# Patient Record
Sex: FEMALE | Race: WHITE | Employment: OTHER | URBAN - METROPOLITAN AREA
[De-identification: names, ages, dates, MRNs, and addresses within clinical notes are randomized per-mention and may not be internally consistent; named-entity substitution may affect disease eponyms.]

---

## 2021-11-29 RX ORDER — RIBOFLAVIN (VITAMIN B2) 100 MG
100 TABLET ORAL DAILY
COMMUNITY

## 2021-11-29 NOTE — PROGRESS NOTES
DAY OF SURGERY/PROCEDURE  GUIDELINES    As a patient at the [facility], you can expect quality medical and nursing care that is centered on your individual needs. It is our goal to make your surgical experience as comfortable and excellent as possible.  ________________________________________________________________________    The following instructions are general guidelines, if any information on this sheet is different from what your doctor has instructed you to do, please follow your doctor's instructions. · Please arrive on 12/13 1100      · Enter through entrance C. Check in at registration     · Upon arrival you will be taken to the pre-operative area to get ready for surgery, your family will stay in the waiting room and visit with you once you are ready for surgery. Due to special limitations please limit visitation to 1-2 members of your family at a time. When it is time for surgery your family will return to the waiting room. · You will be given a specific time when you will NOT be able to eat, drink, smoke, suck or chew ANYTHING (no water, gum, mints, cigarettes, cigars, pipes, snuff, chewing tobacco, etc.) or your surgery may be canceled.  This will occur during your PAT appointment or by phone 1-2 days before surgery    · Take a shower or bath on the morning of your surgery/procedure (Hibiclens if directed)    · Brush your teeth, but do not swallow any water    · IN CASE OF ILLNESS - If you have a cold or flu symptoms (high fever, runny nose, sore throat, cough, etc.) rash, nausea, vomiting, loose stools, and/or recent contact with someone who has a contagious disease (chick pox, measles, etc.) please call your doctor before coming to the surgery center    · If applicable bring your:  · Inhaler (s)  · Hearing aid(s)  · Eyeglasses and Case (If you wear contacts they have to be removed before surgery, bring case and solution)  · CPAP     · DO NOT take anticoagulants (blood

## 2021-12-07 NOTE — H&P
OB/GYN Pre-Op H&P  [facility]    Patient Name: Kaz Fontaine     Patient : 1950  Room/Bed: [facility] OR [place] [facility]/NONE  Admission Date/Time: 2021 10:59 AM  Primary Care Physician: Nicholas BurrowsSavannah Mcgee  MRN: [id number]    Date: 2021  Time: 11:49 AM    The patient was seen in pre-op holding. She is here for surgical management of vaginal vault prolapse, cystocele, and rectocele. The procedure risks and complications were reviewed. The labs, Consent, and H&P were reviewed and updated. The patient was counseled on the possibility of  the need of a second surgery. The patient voiced understanding and had all of her questions answered. The possibility of incomplete removal of abnormal tissue was discussed. OBSTETRICAL HISTORY:   OB History   No obstetric history on file. PAST MEDICAL HISTORY:   has a past medical history of Arthritis, Femur fracture ([address] PONV (postoperative nausea and vomiting), Postpartum hemorrhage, and Seasonal allergies. PAST SURGICAL HISTORY:   has a past surgical history that includes Femur Surgery (Right, ); Femur Surgery (Right, ); Hysterectomy (); Wrist surgery (Left, ); Nose surgery (); Colonoscopy; and skin biopsy (Left).     ALLERGIES:  Allergies as of 11/10/2021    (Not on File)       MEDICATIONS:  Current Facility-Administered Medications   Medication Dose Route Frequency Provider Last Rate Last Admin    ceFAZolin (ANCEF) 2000 mg in dextrose 5 % 50 mL IVPB  2,000 mg IntraVENous Once Thompson Scarce, DO        lactated ringers infusion   IntraVENous Continuous Santa Patel MD        sodium chloride flush 0.9 % injection 10 mL  10 mL IntraVENous 2 times per day Santa Patel MD        sodium chloride flush 0.9 % injection 10 mL  10 mL IntraVENous PRN Santa Patel MD        0.9 % sodium chloride infusion  25 mL IntraVENous PRN Santa Patel MD        lidocaine PF 1 % injection 1 mL  1 mL IntraDERmal Once PRN Conrado Mauricio MD        scopolamine (TRANSDERM-SCOP) transdermal patch 1 patch  1 patch TransDERmal Once April Ip, MD        phenazopyridine (PYRIDIUM) tablet 200 mg  200 mg Oral Once April Ip, MD        aprepitant (EMEND) capsule 40 mg  40 mg Oral Once Conrado Mauricio MD        famotidine (PEPCID) injection 20 mg  20 mg IntraVENous Once Conrado Mauricio MD        meperidine (DEMEROL) injection 12.5 mg  12.5 mg IntraVENous Q5 Min PRN Conrado Mauricio MD        morphine (PF) injection 2 mg  2 mg IntraVENous Q5 Min PRN Conrado Mauricio MD        HYDROmorphone (DILAUDID) injection 0.5 mg  0.5 mg IntraVENous Q5 Min PRN Conrado Mauricio MD        HYDROmorphone (DILAUDID) injection 0.25 mg  0.25 mg IntraVENous Q5 Min PRN Conrado Mauricio MD        HYDROmorphone (DILAUDID) injection 0.5 mg  0.5 mg IntraVENous Q5 Min PRN Conrado Mauricio MD        oxyCODONE-acetaminophen (PERCOCET) 5-325 MG per tablet 1 tablet  1 tablet Oral PRN Conrado Mauricio MD        Or    oxyCODONE-acetaminophen (PERCOCET) 5-325 MG per tablet 2 tablet  2 tablet Oral PRN Conrado Mauricio MD        ondansetron (ZOFRAN) injection 4 mg  4 mg IntraVENous Once PRN Conrado Mauricio MD        promethazine (PHENERGAN) injection 6.25 mg  6.25 mg IntraVENous Q15 Min PRN Conrado Mauricio MD        0.9 % sodium chloride bolus  500 mL IntraVENous Once PRN Conrado Mauricio MD        diphenhydrAMINE (BENADRYL) injection 12.5 mg  12.5 mg IntraVENous Once PRN Conrado Mauricio MD        labetalol (NORMODYNE;TRANDATE) injection syringe 10 mg  10 mg IntraVENous Q10 Min PRN Conrado Mauricio MD        hydrALAZINE (APRESOLINE) injection 10 mg  10 mg IntraVENous Q10 Min PRN Conrado Mauricio MD        midazolam PF (VERSED) injection 1 mg  1 mg IntraVENous Once Conrado Mauricio MD        fentaNYL (SUBLIMAZE) injection 100 mcg  100 mcg IntraVENous Once Conrado Mauricio MD        midazolam PF (VERSED) injection 2 mg  2 mg IntraVENous Once Alex Wevaer MD        phenazopyridine (PYRIDIUM) 100 MG tablet             famotidine (PEPCID) 20 MG/2ML injection             aprepitant (EMEND) 40 MG capsule             fentaNYL (SUBLIMAZE) 100 MCG/2ML injection             midazolam (VERSED) 2 MG/2ML injection                FAMILY HISTORY:  family history is not on file. SOCIAL HISTORY:   reports that she quit smoking about 6 years ago. Her smoking use included cigarettes. She has never used smokeless tobacco. She reports previous alcohol use. VITALS:  Vitals:    11/29/21 1603 12/13/21 1119   BP:  (!) 155/74   Pulse:  93   Resp:  19   Temp:  98 °F (36.7 °C)   SpO2:  99%   Weight: 105 lb (47.6 kg) 105 lb (47.6 kg)   Height: 5' 4\" (1.626 m) 5' 4\" (1.626 m)                                                                                                                               PHYSICAL EXAM:     Unchanged from Prior H&P  CONSTITUTIONAL:  Alert and oriented, no acute distress  HEAD: normocephalic, atraumatic  EYES: Pupils equal and reactive to light, Extraocular muscles intact, sclera non icteric  ENT: Mucus membranes moist, No otorrhea, no rhinorrhea  NECK:  supple, symmetrical, trachea midline   LUNGS:  Good air movement bilaterally, unlabored respirations, no wheezes or rhonchi  CARDIOVASCULAR: Regular rate and rhythm, no murmurs rubs or gallops  ABDOMEN: soft, non tender, non distended, no rebound or guarding, no hernias, no hepatomegaly, no splenomegly  MUSCULOSKELETAL:  Equal strength bilaterally, normal muscle tone  SKIN: No abscess or rash  NEUROLOGIC:  Cranial nerves 2-12 grossly intact, no focal deficits  PSYCH: affect appropriate  Pelvic Exam: deferred to OR      LAB RESULTS:  No visits with results within 4 Week(s) from this visit. Latest known visit with results is:   No results found for any previous visit. DIAGNOSIS & PLAN:  1.  Vaginal Vault Prolapse, Cystocele, Rectocele   - Proceed with planned procedure: robotic sacrocolpopexy, with Restorelle mesh cystoscopy, and possible anterior/posterior vaginal repair   - Consent signed, on chart. - The patient is ready for transport to the operative suite. Counseling: The patient was counseled on all options both medical and surgical, conservative as well as definitive. She has elected to proceed with the procedure as stated above. The patient was counseled on the procedure. Risks and complications were reviewed in detail. The patients orders, labs, consents have been completed. The history and physical as well as all supporting surgical documentation will be forwarded to the pre-operative holding area. The patient is aware that this procedure may not alleviate her symptoms. That there may be a necessity for a second surgery and that there may be an incomplete removal of abnormal tissue.     Emmett Ivory DO  Ob/Gyn Resident  Pager: [phone]  [facility]  12/13/2021, 11:49 AM

## 2021-12-07 NOTE — DISCHARGE SUMMARY
Gyn Discharge Summary  [facility]      Patient Name: Sai Riley  Patient : 1950  Primary Care Physician: Albina Reid AICHHOLZ  Admit Date: 2021    Principal Diagnosis: cystocele, rectocele, vaginal vault prolapse    Other Diagnosis:   S/P sacrocolpopexy [Z98.890]  Patient Active Problem List   Diagnosis    S/P sacrocolpopexy       Infection: No  Hospital Acquired: No    Surgical Operations & Procedures: robotic sacrocolpopexy, anterior and posterior repairs, cystoscopy     Consultations: Anesthesia    Pertinent Findings & Procedures:   Sai Riley is a 70 y.o. female , admitted for surgical management of Vaginal vault prolapse, cystocele, and rectocele; received Ancef preoperatively. She underwent robotic sacrocolpopexy, anterior and posterior repairs, cystoscopy on 21. POD#1 labs were unremarkable. Void trial was successful and patient was discharged home without a powers. Post-op course normal, discharged home. Follow up in 1-2 weeks. Discharge instructions reviewed and questions answered. Course of patient: normal  POD#0: Rapid response was called. Patient was given Narcan and her mentation improved. Discharge to: Home    Readmission planned: No    Recommendations on Discharge:     Medications:     Medication List      START taking these medications    ciprofloxacin 250 MG tablet  Commonly known as: CIPRO  Take 1 tablet by mouth 2 times daily for 7 days Take for full 7 days if discharged home with powers.  Take for 5 days if discharged home without powers     ibuprofen 600 MG tablet  Commonly known as: ADVIL;MOTRIN  Take 1 tablet by mouth every 6 hours as needed for Pain     ondansetron 4 MG disintegrating tablet  Commonly known as: Zofran ODT  Take 1 tablet by mouth every 8 hours as needed for Nausea or Vomiting     senna-docusate 8.6-50 MG per tablet  Commonly known as: Senokot S  Take 1 tablet by mouth daily     tamsulosin 0.4 MG capsule  Commonly known as: FLOMAX  Take 1 capsule by mouth daily for 7 days Take only if discharged home without powers and having difficulty with urination        CONTINUE taking these medications    BIOFLEX PO     CLARITIN PO     vitamin C 100 MG tablet           Where to Get Your Medications      You can get these medications from any pharmacy    Bring a paper prescription for each of these medications  · ciprofloxacin 250 MG tablet  · ibuprofen 600 MG tablet  · ondansetron 4 MG disintegrating tablet  · senna-docusate 8.6-50 MG per tablet  · tamsulosin 0.4 MG capsule       Activity: pelvic rest x 8 weeks, no driving on narcotics, no lifting greater than 10 lbs  Diet: regular diet  Follow up: 1-2 weeks     Condition on discharge: good and stable   Discharge Date: 12/14/21    Comments:  Home care, Follow-up care, restrictions reviewed.     Reid Davis MD  Ob/Gyn Resident  [facility]  12/14/2021, 12:01 PM

## 2021-12-10 ENCOUNTER — ANESTHESIA EVENT (OUTPATIENT)
Dept: OPERATING ROOM | Age: 71
End: 2021-12-10
Payer: MEDICARE

## 2021-12-13 ENCOUNTER — ANESTHESIA (OUTPATIENT)
Dept: OPERATING ROOM | Age: 71
End: 2021-12-13
Payer: MEDICARE

## 2021-12-13 ENCOUNTER — HOSPITAL ENCOUNTER (OUTPATIENT)
Age: 71
Discharge: HOME | End: 2021-12-14
Attending: OBSTETRICS & GYNECOLOGY | Admitting: OBSTETRICS & GYNECOLOGY
Payer: MEDICARE

## 2021-12-13 VITALS — DIASTOLIC BLOOD PRESSURE: 68 MMHG | OXYGEN SATURATION: 100 % | SYSTOLIC BLOOD PRESSURE: 144 MMHG | TEMPERATURE: 93.2 F

## 2021-12-13 DIAGNOSIS — G89.18 POST-OP PAIN: Primary | ICD-10-CM

## 2021-12-13 PROBLEM — Z98.890 S/P SACROCOLPOPEXY: Status: ACTIVE | Noted: 2021-12-13

## 2021-12-13 LAB — GLUCOSE BLD-MCNC: 206 MG/DL (ref 65–105)

## 2021-12-13 PROCEDURE — 6360000002 HC RX W HCPCS: Performed by: STUDENT IN AN ORGANIZED HEALTH CARE EDUCATION/TRAINING PROGRAM

## 2021-12-13 PROCEDURE — 2580000003 HC RX 258: Performed by: ANESTHESIOLOGY

## 2021-12-13 PROCEDURE — 3700000001 HC ADD 15 MINUTES (ANESTHESIA): Performed by: OBSTETRICS & GYNECOLOGY

## 2021-12-13 PROCEDURE — C1760 CLOSURE DEV, VASC: HCPCS | Performed by: OBSTETRICS & GYNECOLOGY

## 2021-12-13 PROCEDURE — 3600000009 HC SURGERY ROBOT BASE: Performed by: OBSTETRICS & GYNECOLOGY

## 2021-12-13 PROCEDURE — C1781 MESH (IMPLANTABLE): HCPCS | Performed by: OBSTETRICS & GYNECOLOGY

## 2021-12-13 PROCEDURE — 2709999900 HC NON-CHARGEABLE SUPPLY: Performed by: OBSTETRICS & GYNECOLOGY

## 2021-12-13 PROCEDURE — 6370000000 HC RX 637 (ALT 250 FOR IP)

## 2021-12-13 PROCEDURE — 6360000002 HC RX W HCPCS: Performed by: ANESTHESIOLOGY

## 2021-12-13 PROCEDURE — 2500000003 HC RX 250 WO HCPCS: Performed by: NURSE ANESTHETIST, CERTIFIED REGISTERED

## 2021-12-13 PROCEDURE — 6360000002 HC RX W HCPCS: Performed by: NURSE ANESTHETIST, CERTIFIED REGISTERED

## 2021-12-13 PROCEDURE — C9290 INJ, BUPIVACAINE LIPOSOME: HCPCS | Performed by: ANESTHESIOLOGY

## 2021-12-13 PROCEDURE — 64488 TAP BLOCK BI INJECTION: CPT | Performed by: ANESTHESIOLOGY

## 2021-12-13 PROCEDURE — 6360000002 HC RX W HCPCS

## 2021-12-13 PROCEDURE — 2700000000 HC OXYGEN THERAPY PER DAY

## 2021-12-13 PROCEDURE — 2500000003 HC RX 250 WO HCPCS: Performed by: STUDENT IN AN ORGANIZED HEALTH CARE EDUCATION/TRAINING PROGRAM

## 2021-12-13 PROCEDURE — 6360000002 HC RX W HCPCS: Performed by: OBSTETRICS & GYNECOLOGY

## 2021-12-13 PROCEDURE — 2580000003 HC RX 258: Performed by: STUDENT IN AN ORGANIZED HEALTH CARE EDUCATION/TRAINING PROGRAM

## 2021-12-13 PROCEDURE — 88302 TISSUE EXAM BY PATHOLOGIST: CPT

## 2021-12-13 PROCEDURE — 7100000001 HC PACU RECOVERY - ADDTL 15 MIN: Performed by: OBSTETRICS & GYNECOLOGY

## 2021-12-13 PROCEDURE — 6370000000 HC RX 637 (ALT 250 FOR IP): Performed by: STUDENT IN AN ORGANIZED HEALTH CARE EDUCATION/TRAINING PROGRAM

## 2021-12-13 PROCEDURE — 82947 ASSAY GLUCOSE BLOOD QUANT: CPT

## 2021-12-13 PROCEDURE — S2900 ROBOTIC SURGICAL SYSTEM: HCPCS | Performed by: OBSTETRICS & GYNECOLOGY

## 2021-12-13 PROCEDURE — 94761 N-INVAS EAR/PLS OXIMETRY MLT: CPT

## 2021-12-13 PROCEDURE — 3600000019 HC SURGERY ROBOT ADDTL 15MIN: Performed by: OBSTETRICS & GYNECOLOGY

## 2021-12-13 PROCEDURE — 3700000000 HC ANESTHESIA ATTENDED CARE: Performed by: OBSTETRICS & GYNECOLOGY

## 2021-12-13 PROCEDURE — 7100000000 HC PACU RECOVERY - FIRST 15 MIN: Performed by: OBSTETRICS & GYNECOLOGY

## 2021-12-13 PROCEDURE — 2500000003 HC RX 250 WO HCPCS: Performed by: ANESTHESIOLOGY

## 2021-12-13 PROCEDURE — 2500000003 HC RX 250 WO HCPCS

## 2021-12-13 DEVICE — POLYPROPYLENE MESH FOR SACROCOLPOSUSPENSION/SACROCOLPOPEXY - Y
Type: IMPLANTABLE DEVICE | Status: FUNCTIONAL
Brand: RESTORELLE

## 2021-12-13 RX ORDER — BUPIVACAINE HYDROCHLORIDE 2.5 MG/ML
INJECTION, SOLUTION EPIDURAL; INFILTRATION; INTRACAUDAL
Status: COMPLETED
Start: 2021-12-13 | End: 2021-12-13

## 2021-12-13 RX ORDER — SODIUM CHLORIDE 9 MG/ML
INJECTION, SOLUTION INTRAVENOUS CONTINUOUS
Status: DISCONTINUED | OUTPATIENT
Start: 2021-12-13 | End: 2021-12-14 | Stop reason: HOSPADM

## 2021-12-13 RX ORDER — OXYCODONE HYDROCHLORIDE AND ACETAMINOPHEN 5; 325 MG/1; MG/1
1 TABLET ORAL PRN
Status: COMPLETED | OUTPATIENT
Start: 2021-12-13 | End: 2021-12-13

## 2021-12-13 RX ORDER — ONDANSETRON 2 MG/ML
INJECTION INTRAMUSCULAR; INTRAVENOUS PRN
Status: DISCONTINUED | OUTPATIENT
Start: 2021-12-13 | End: 2021-12-13 | Stop reason: SDUPTHER

## 2021-12-13 RX ORDER — SODIUM CHLORIDE 0.9 % (FLUSH) 0.9 %
10 SYRINGE (ML) INJECTION PRN
Status: DISCONTINUED | OUTPATIENT
Start: 2021-12-13 | End: 2021-12-13 | Stop reason: HOSPADM

## 2021-12-13 RX ORDER — PROMETHAZINE HYDROCHLORIDE 25 MG/ML
6.25 INJECTION, SOLUTION INTRAMUSCULAR; INTRAVENOUS
Status: DISCONTINUED | OUTPATIENT
Start: 2021-12-13 | End: 2021-12-13 | Stop reason: HOSPADM

## 2021-12-13 RX ORDER — MORPHINE SULFATE 2 MG/ML
2 INJECTION, SOLUTION INTRAMUSCULAR; INTRAVENOUS EVERY 5 MIN PRN
Status: DISCONTINUED | OUTPATIENT
Start: 2021-12-13 | End: 2021-12-13 | Stop reason: HOSPADM

## 2021-12-13 RX ORDER — APREPITANT 40 MG/1
CAPSULE ORAL
Status: COMPLETED
Start: 2021-12-13 | End: 2021-12-13

## 2021-12-13 RX ORDER — PHENAZOPYRIDINE HYDROCHLORIDE 100 MG/1
200 TABLET, FILM COATED ORAL ONCE
Status: COMPLETED | OUTPATIENT
Start: 2021-12-13 | End: 2021-12-13

## 2021-12-13 RX ORDER — LIDOCAINE HYDROCHLORIDE 10 MG/ML
1 INJECTION, SOLUTION EPIDURAL; INFILTRATION; INTRACAUDAL; PERINEURAL
Status: DISCONTINUED | OUTPATIENT
Start: 2021-12-13 | End: 2021-12-13 | Stop reason: HOSPADM

## 2021-12-13 RX ORDER — ROCURONIUM BROMIDE 10 MG/ML
INJECTION, SOLUTION INTRAVENOUS PRN
Status: DISCONTINUED | OUTPATIENT
Start: 2021-12-13 | End: 2021-12-13 | Stop reason: SDUPTHER

## 2021-12-13 RX ORDER — ONDANSETRON 2 MG/ML
4 INJECTION INTRAMUSCULAR; INTRAVENOUS
Status: COMPLETED | OUTPATIENT
Start: 2021-12-13 | End: 2021-12-13

## 2021-12-13 RX ORDER — PROPOFOL 10 MG/ML
INJECTION, EMULSION INTRAVENOUS PRN
Status: DISCONTINUED | OUTPATIENT
Start: 2021-12-13 | End: 2021-12-13 | Stop reason: SDUPTHER

## 2021-12-13 RX ORDER — SCOLOPAMINE TRANSDERMAL SYSTEM 1 MG/1
1 PATCH, EXTENDED RELEASE TRANSDERMAL ONCE
Status: DISCONTINUED | OUTPATIENT
Start: 2021-12-13 | End: 2021-12-14 | Stop reason: HOSPADM

## 2021-12-13 RX ORDER — NEOSTIGMINE METHYLSULFATE 5 MG/5 ML
SYRINGE (ML) INTRAVENOUS PRN
Status: DISCONTINUED | OUTPATIENT
Start: 2021-12-13 | End: 2021-12-13 | Stop reason: SDUPTHER

## 2021-12-13 RX ORDER — MIDAZOLAM HYDROCHLORIDE 2 MG/2ML
1 INJECTION, SOLUTION INTRAMUSCULAR; INTRAVENOUS ONCE
Status: DISCONTINUED | OUTPATIENT
Start: 2021-12-13 | End: 2021-12-13 | Stop reason: HOSPADM

## 2021-12-13 RX ORDER — ONDANSETRON 4 MG/1
4 TABLET, ORALLY DISINTEGRATING ORAL EVERY 8 HOURS PRN
Qty: 15 TABLET | Refills: 0 | Status: SHIPPED | OUTPATIENT
Start: 2021-12-13 | End: 2021-12-14 | Stop reason: SDUPTHER

## 2021-12-13 RX ORDER — IBUPROFEN 600 MG/1
600 TABLET ORAL EVERY 6 HOURS PRN
Qty: 30 TABLET | Refills: 1 | Status: SHIPPED | OUTPATIENT
Start: 2021-12-13 | End: 2021-12-14 | Stop reason: SDUPTHER

## 2021-12-13 RX ORDER — MIDAZOLAM HYDROCHLORIDE 1 MG/ML
INJECTION INTRAMUSCULAR; INTRAVENOUS
Status: COMPLETED
Start: 2021-12-13 | End: 2021-12-13

## 2021-12-13 RX ORDER — HYDRALAZINE HYDROCHLORIDE 20 MG/ML
10 INJECTION INTRAMUSCULAR; INTRAVENOUS EVERY 10 MIN PRN
Status: DISCONTINUED | OUTPATIENT
Start: 2021-12-13 | End: 2021-12-13 | Stop reason: HOSPADM

## 2021-12-13 RX ORDER — HYDROCODONE BITARTRATE AND ACETAMINOPHEN 5; 325 MG/1; MG/1
1 TABLET ORAL EVERY 4 HOURS PRN
Status: DISCONTINUED | OUTPATIENT
Start: 2021-12-13 | End: 2021-12-14 | Stop reason: HOSPADM

## 2021-12-13 RX ORDER — SODIUM CHLORIDE, SODIUM LACTATE, POTASSIUM CHLORIDE, CALCIUM CHLORIDE 600; 310; 30; 20 MG/100ML; MG/100ML; MG/100ML; MG/100ML
INJECTION, SOLUTION INTRAVENOUS CONTINUOUS
Status: DISCONTINUED | OUTPATIENT
Start: 2021-12-13 | End: 2021-12-13

## 2021-12-13 RX ORDER — MIDAZOLAM HYDROCHLORIDE 2 MG/2ML
2 INJECTION, SOLUTION INTRAMUSCULAR; INTRAVENOUS ONCE
Status: COMPLETED | OUTPATIENT
Start: 2021-12-13 | End: 2021-12-13

## 2021-12-13 RX ORDER — FENTANYL CITRATE 50 UG/ML
INJECTION, SOLUTION INTRAMUSCULAR; INTRAVENOUS
Status: COMPLETED
Start: 2021-12-13 | End: 2021-12-13

## 2021-12-13 RX ORDER — ONDANSETRON 4 MG/1
4 TABLET, ORALLY DISINTEGRATING ORAL EVERY 8 HOURS PRN
Status: DISCONTINUED | OUTPATIENT
Start: 2021-12-13 | End: 2021-12-14 | Stop reason: HOSPADM

## 2021-12-13 RX ORDER — SODIUM CHLORIDE 9 MG/ML
25 INJECTION, SOLUTION INTRAVENOUS PRN
Status: DISCONTINUED | OUTPATIENT
Start: 2021-12-13 | End: 2021-12-13 | Stop reason: HOSPADM

## 2021-12-13 RX ORDER — HYDROCODONE BITARTRATE AND ACETAMINOPHEN 5; 325 MG/1; MG/1
1 TABLET ORAL EVERY 6 HOURS PRN
Qty: 24 TABLET | Refills: 0 | Status: SHIPPED | OUTPATIENT
Start: 2021-12-13 | End: 2021-12-14 | Stop reason: SDUPTHER

## 2021-12-13 RX ORDER — AMOXICILLIN 250 MG
1 CAPSULE ORAL DAILY
Qty: 60 TABLET | Refills: 0 | Status: SHIPPED | OUTPATIENT
Start: 2021-12-13 | End: 2021-12-14 | Stop reason: SDUPTHER

## 2021-12-13 RX ORDER — OXYCODONE HYDROCHLORIDE AND ACETAMINOPHEN 5; 325 MG/1; MG/1
TABLET ORAL
Status: COMPLETED
Start: 2021-12-13 | End: 2021-12-13

## 2021-12-13 RX ORDER — PHENAZOPYRIDINE HYDROCHLORIDE 100 MG/1
TABLET, FILM COATED ORAL
Status: COMPLETED
Start: 2021-12-13 | End: 2021-12-13

## 2021-12-13 RX ORDER — CIPROFLOXACIN 250 MG/1
250 TABLET, FILM COATED ORAL 2 TIMES DAILY
Qty: 14 TABLET | Refills: 0 | Status: SHIPPED | OUTPATIENT
Start: 2021-12-13 | End: 2021-12-14 | Stop reason: SDUPTHER

## 2021-12-13 RX ORDER — ONDANSETRON 2 MG/ML
INJECTION INTRAMUSCULAR; INTRAVENOUS
Status: COMPLETED
Start: 2021-12-13 | End: 2021-12-13

## 2021-12-13 RX ORDER — BUPIVACAINE HYDROCHLORIDE 2.5 MG/ML
INJECTION, SOLUTION EPIDURAL; INFILTRATION; INTRACAUDAL
Status: COMPLETED | OUTPATIENT
Start: 2021-12-13 | End: 2021-12-13

## 2021-12-13 RX ORDER — SODIUM CHLORIDE 9 MG/ML
25 INJECTION, SOLUTION INTRAVENOUS PRN
Status: DISCONTINUED | OUTPATIENT
Start: 2021-12-13 | End: 2021-12-14 | Stop reason: HOSPADM

## 2021-12-13 RX ORDER — TAMSULOSIN HYDROCHLORIDE 0.4 MG/1
0.4 CAPSULE ORAL DAILY
Qty: 7 CAPSULE | Refills: 0 | Status: SHIPPED | OUTPATIENT
Start: 2021-12-13 | End: 2021-12-14 | Stop reason: SDUPTHER

## 2021-12-13 RX ORDER — SODIUM CHLORIDE 0.9 % (FLUSH) 0.9 %
10 SYRINGE (ML) INJECTION EVERY 12 HOURS SCHEDULED
Status: DISCONTINUED | OUTPATIENT
Start: 2021-12-13 | End: 2021-12-13 | Stop reason: HOSPADM

## 2021-12-13 RX ORDER — 0.9 % SODIUM CHLORIDE 0.9 %
500 INTRAVENOUS SOLUTION INTRAVENOUS
Status: DISCONTINUED | OUTPATIENT
Start: 2021-12-13 | End: 2021-12-13 | Stop reason: HOSPADM

## 2021-12-13 RX ORDER — CLINDAMYCIN PHOSPHATE 300 MG/50ML
300 INJECTION INTRAVENOUS EVERY 8 HOURS
Status: COMPLETED | OUTPATIENT
Start: 2021-12-13 | End: 2021-12-14

## 2021-12-13 RX ORDER — LIDOCAINE HYDROCHLORIDE 10 MG/ML
INJECTION, SOLUTION EPIDURAL; INFILTRATION; INTRACAUDAL; PERINEURAL PRN
Status: DISCONTINUED | OUTPATIENT
Start: 2021-12-13 | End: 2021-12-13 | Stop reason: SDUPTHER

## 2021-12-13 RX ORDER — NALOXONE HYDROCHLORIDE 1 MG/ML
1 INJECTION INTRAMUSCULAR; INTRAVENOUS; SUBCUTANEOUS ONCE
Status: COMPLETED | OUTPATIENT
Start: 2021-12-13 | End: 2021-12-13

## 2021-12-13 RX ORDER — KETOROLAC TROMETHAMINE 15 MG/ML
15 INJECTION, SOLUTION INTRAMUSCULAR; INTRAVENOUS EVERY 6 HOURS
Status: DISCONTINUED | OUTPATIENT
Start: 2021-12-13 | End: 2021-12-13

## 2021-12-13 RX ORDER — GLYCOPYRROLATE 1 MG/5 ML
SYRINGE (ML) INTRAVENOUS PRN
Status: DISCONTINUED | OUTPATIENT
Start: 2021-12-13 | End: 2021-12-13 | Stop reason: SDUPTHER

## 2021-12-13 RX ORDER — ONDANSETRON 2 MG/ML
4 INJECTION INTRAMUSCULAR; INTRAVENOUS EVERY 6 HOURS PRN
Status: DISCONTINUED | OUTPATIENT
Start: 2021-12-13 | End: 2021-12-14 | Stop reason: HOSPADM

## 2021-12-13 RX ORDER — DIPHENHYDRAMINE HYDROCHLORIDE 50 MG/ML
12.5 INJECTION INTRAMUSCULAR; INTRAVENOUS
Status: DISCONTINUED | OUTPATIENT
Start: 2021-12-13 | End: 2021-12-13 | Stop reason: HOSPADM

## 2021-12-13 RX ORDER — HYDROCODONE BITARTRATE AND ACETAMINOPHEN 5; 325 MG/1; MG/1
1 TABLET ORAL EVERY 4 HOURS PRN
Status: DISCONTINUED | OUTPATIENT
Start: 2021-12-13 | End: 2021-12-13

## 2021-12-13 RX ORDER — SIMETHICONE 80 MG
80 TABLET,CHEWABLE ORAL 4 TIMES DAILY PRN
Qty: 30 TABLET | Refills: 0 | Status: SHIPPED | OUTPATIENT
Start: 2021-12-13 | End: 2021-12-13 | Stop reason: HOSPADM

## 2021-12-13 RX ORDER — KETOROLAC TROMETHAMINE 30 MG/ML
15 INJECTION, SOLUTION INTRAMUSCULAR; INTRAVENOUS EVERY 6 HOURS
Status: DISCONTINUED | OUTPATIENT
Start: 2021-12-13 | End: 2021-12-14 | Stop reason: HOSPADM

## 2021-12-13 RX ORDER — MEPERIDINE HYDROCHLORIDE 50 MG/ML
12.5 INJECTION INTRAMUSCULAR; INTRAVENOUS; SUBCUTANEOUS EVERY 5 MIN PRN
Status: DISCONTINUED | OUTPATIENT
Start: 2021-12-13 | End: 2021-12-13 | Stop reason: HOSPADM

## 2021-12-13 RX ORDER — SCOLOPAMINE TRANSDERMAL SYSTEM 1 MG/1
1 PATCH, EXTENDED RELEASE TRANSDERMAL ONCE
Status: DISCONTINUED | OUTPATIENT
Start: 2021-12-13 | End: 2021-12-14 | Stop reason: SDUPTHER

## 2021-12-13 RX ORDER — SODIUM CHLORIDE 0.9 % (FLUSH) 0.9 %
5-40 SYRINGE (ML) INJECTION PRN
Status: DISCONTINUED | OUTPATIENT
Start: 2021-12-13 | End: 2021-12-14 | Stop reason: HOSPADM

## 2021-12-13 RX ORDER — SCOLOPAMINE TRANSDERMAL SYSTEM 1 MG/1
PATCH, EXTENDED RELEASE TRANSDERMAL
Status: DISCONTINUED
Start: 2021-12-13 | End: 2021-12-14 | Stop reason: HOSPADM

## 2021-12-13 RX ORDER — SENNA AND DOCUSATE SODIUM 50; 8.6 MG/1; MG/1
1 TABLET, FILM COATED ORAL NIGHTLY
Status: DISCONTINUED | OUTPATIENT
Start: 2021-12-13 | End: 2021-12-14 | Stop reason: HOSPADM

## 2021-12-13 RX ORDER — FENTANYL CITRATE 50 UG/ML
INJECTION, SOLUTION INTRAMUSCULAR; INTRAVENOUS PRN
Status: DISCONTINUED | OUTPATIENT
Start: 2021-12-13 | End: 2021-12-13 | Stop reason: SDUPTHER

## 2021-12-13 RX ORDER — OXYCODONE HYDROCHLORIDE AND ACETAMINOPHEN 5; 325 MG/1; MG/1
2 TABLET ORAL PRN
Status: COMPLETED | OUTPATIENT
Start: 2021-12-13 | End: 2021-12-13

## 2021-12-13 RX ORDER — FENTANYL CITRATE 50 UG/ML
100 INJECTION, SOLUTION INTRAMUSCULAR; INTRAVENOUS ONCE
Status: COMPLETED | OUTPATIENT
Start: 2021-12-13 | End: 2021-12-13

## 2021-12-13 RX ORDER — APREPITANT 40 MG/1
40 CAPSULE ORAL ONCE
Status: COMPLETED | OUTPATIENT
Start: 2021-12-13 | End: 2021-12-13

## 2021-12-13 RX ORDER — MORPHINE SULFATE 2 MG/ML
2 INJECTION, SOLUTION INTRAMUSCULAR; INTRAVENOUS EVERY 4 HOURS PRN
Status: DISCONTINUED | OUTPATIENT
Start: 2021-12-13 | End: 2021-12-14 | Stop reason: HOSPADM

## 2021-12-13 RX ORDER — SODIUM CHLORIDE 0.9 % (FLUSH) 0.9 %
5-40 SYRINGE (ML) INJECTION EVERY 12 HOURS SCHEDULED
Status: DISCONTINUED | OUTPATIENT
Start: 2021-12-13 | End: 2021-12-14 | Stop reason: HOSPADM

## 2021-12-13 RX ORDER — LABETALOL 20 MG/4 ML (5 MG/ML) INTRAVENOUS SYRINGE
10 EVERY 10 MIN PRN
Status: DISCONTINUED | OUTPATIENT
Start: 2021-12-13 | End: 2021-12-13 | Stop reason: HOSPADM

## 2021-12-13 RX ORDER — HYDROCODONE BITARTRATE AND ACETAMINOPHEN 5; 325 MG/1; MG/1
2 TABLET ORAL EVERY 4 HOURS PRN
Status: DISCONTINUED | OUTPATIENT
Start: 2021-12-13 | End: 2021-12-13

## 2021-12-13 RX ADMIN — Medication 0.4 MG: at 14:07

## 2021-12-13 RX ADMIN — ONDANSETRON 4 MG: 2 INJECTION INTRAMUSCULAR; INTRAVENOUS at 15:30

## 2021-12-13 RX ADMIN — OXYCODONE HYDROCHLORIDE AND ACETAMINOPHEN 1 TABLET: 5; 325 TABLET ORAL at 16:06

## 2021-12-13 RX ADMIN — DOCUSATE SODIUM 50 MG AND SENNOSIDES 8.6 MG 1 TABLET: 8.6; 5 TABLET, FILM COATED ORAL at 20:58

## 2021-12-13 RX ADMIN — MIDAZOLAM HYDROCHLORIDE 2 MG: 2 INJECTION, SOLUTION INTRAMUSCULAR; INTRAVENOUS at 12:30

## 2021-12-13 RX ADMIN — FAMOTIDINE 20 MG: 10 INJECTION, SOLUTION INTRAVENOUS at 11:52

## 2021-12-13 RX ADMIN — LIDOCAINE HYDROCHLORIDE 50 MG: 10 INJECTION, SOLUTION EPIDURAL; INFILTRATION; INTRACAUDAL at 12:49

## 2021-12-13 RX ADMIN — FENTANYL CITRATE 100 MCG: 50 INJECTION, SOLUTION INTRAMUSCULAR; INTRAVENOUS at 12:28

## 2021-12-13 RX ADMIN — CLINDAMYCIN IN 5 PERCENT DEXTROSE 300 MG: 6 INJECTION, SOLUTION INTRAVENOUS at 20:58

## 2021-12-13 RX ADMIN — BUPIVACAINE 20 ML: 13.3 INJECTION, SUSPENSION, LIPOSOMAL INFILTRATION at 12:38

## 2021-12-13 RX ADMIN — FENTANYL CITRATE 50 MCG: 50 INJECTION, SOLUTION INTRAMUSCULAR; INTRAVENOUS at 13:28

## 2021-12-13 RX ADMIN — FENTANYL CITRATE 50 MCG: 50 INJECTION, SOLUTION INTRAMUSCULAR; INTRAVENOUS at 14:56

## 2021-12-13 RX ADMIN — HYDROMORPHONE HYDROCHLORIDE 0.5 MG: 1 INJECTION, SOLUTION INTRAMUSCULAR; INTRAVENOUS; SUBCUTANEOUS at 16:00

## 2021-12-13 RX ADMIN — HYDROCODONE BITARTRATE AND ACETAMINOPHEN 1 TABLET: 5; 325 TABLET ORAL at 21:02

## 2021-12-13 RX ADMIN — Medication 0.5 MG: at 16:00

## 2021-12-13 RX ADMIN — HYDROMORPHONE HYDROCHLORIDE 0.5 MG: 1 INJECTION, SOLUTION INTRAMUSCULAR; INTRAVENOUS; SUBCUTANEOUS at 15:30

## 2021-12-13 RX ADMIN — MIDAZOLAM 2 MG: 1 INJECTION INTRAMUSCULAR; INTRAVENOUS at 12:30

## 2021-12-13 RX ADMIN — SODIUM CHLORIDE: 9 INJECTION, SOLUTION INTRAVENOUS at 17:40

## 2021-12-13 RX ADMIN — CEFAZOLIN SODIUM 2000 MG: 10 INJECTION, POWDER, FOR SOLUTION INTRAVENOUS at 12:45

## 2021-12-13 RX ADMIN — Medication 0.5 MG: at 15:43

## 2021-12-13 RX ADMIN — APREPITANT 40 MG: 40 CAPSULE ORAL at 11:52

## 2021-12-13 RX ADMIN — ONDANSETRON 4 MG: 2 INJECTION INTRAMUSCULAR; INTRAVENOUS at 14:42

## 2021-12-13 RX ADMIN — KETOROLAC TROMETHAMINE 15 MG: 30 INJECTION, SOLUTION INTRAMUSCULAR; INTRAVENOUS at 18:27

## 2021-12-13 RX ADMIN — PHENAZOPYRIDINE HYDROCHLORIDE 200 MG: 100 TABLET, FILM COATED ORAL at 11:51

## 2021-12-13 RX ADMIN — HYDROMORPHONE HYDROCHLORIDE 0.5 MG: 1 INJECTION, SOLUTION INTRAMUSCULAR; INTRAVENOUS; SUBCUTANEOUS at 15:43

## 2021-12-13 RX ADMIN — SODIUM CHLORIDE, POTASSIUM CHLORIDE, SODIUM LACTATE AND CALCIUM CHLORIDE: 600; 310; 30; 20 INJECTION, SOLUTION INTRAVENOUS at 13:28

## 2021-12-13 RX ADMIN — SODIUM CHLORIDE, PRESERVATIVE FREE 10 ML: 5 INJECTION INTRAVENOUS at 21:02

## 2021-12-13 RX ADMIN — NALOXONE HYDROCHLORIDE 1 MG: 1 INJECTION PARENTERAL at 17:39

## 2021-12-13 RX ADMIN — BUPIVACAINE HYDROCHLORIDE 60 ML: 2.5 INJECTION, SOLUTION EPIDURAL; INFILTRATION; INTRACAUDAL; PERINEURAL at 12:38

## 2021-12-13 RX ADMIN — Medication 0.2 MG: at 14:48

## 2021-12-13 RX ADMIN — Medication 1 MG: at 14:48

## 2021-12-13 RX ADMIN — PROPOFOL 200 MG: 10 INJECTION, EMULSION INTRAVENOUS at 12:49

## 2021-12-13 RX ADMIN — SODIUM CHLORIDE, POTASSIUM CHLORIDE, SODIUM LACTATE AND CALCIUM CHLORIDE: 600; 310; 30; 20 INJECTION, SOLUTION INTRAVENOUS at 12:04

## 2021-12-13 RX ADMIN — Medication 0.5 MG: at 15:30

## 2021-12-13 RX ADMIN — ROCURONIUM BROMIDE 40 MG: 10 INJECTION INTRAVENOUS at 12:49

## 2021-12-13 RX ADMIN — PHENAZOPYRIDINE HYDROCHLORIDE 200 MG: 100 TABLET ORAL at 11:51

## 2021-12-13 ASSESSMENT — PULMONARY FUNCTION TESTS
PIF_VALUE: 19
PIF_VALUE: 21
PIF_VALUE: 11
PIF_VALUE: 19
PIF_VALUE: 13
PIF_VALUE: 15
PIF_VALUE: 2
PIF_VALUE: 20
PIF_VALUE: 20
PIF_VALUE: 2
PIF_VALUE: 12
PIF_VALUE: 19
PIF_VALUE: 14
PIF_VALUE: 0
PIF_VALUE: 18
PIF_VALUE: 19
PIF_VALUE: 15
PIF_VALUE: 21
PIF_VALUE: 20
PIF_VALUE: 11
PIF_VALUE: 20
PIF_VALUE: 14
PIF_VALUE: 19
PIF_VALUE: 25
PIF_VALUE: 21
PIF_VALUE: 20
PIF_VALUE: 12
PIF_VALUE: 1
PIF_VALUE: 21
PIF_VALUE: 21
PIF_VALUE: 14
PIF_VALUE: 13
PIF_VALUE: 15
PIF_VALUE: 13
PIF_VALUE: 14
PIF_VALUE: 15
PIF_VALUE: 14
PIF_VALUE: 14
PIF_VALUE: 19
PIF_VALUE: 20
PIF_VALUE: 12
PIF_VALUE: 20
PIF_VALUE: 19
PIF_VALUE: 16
PIF_VALUE: 19
PIF_VALUE: 21
PIF_VALUE: 12
PIF_VALUE: 19
PIF_VALUE: 19
PIF_VALUE: 12
PIF_VALUE: 15
PIF_VALUE: 13
PIF_VALUE: 19
PIF_VALUE: 18
PIF_VALUE: 13
PIF_VALUE: 19
PIF_VALUE: 3
PIF_VALUE: 14
PIF_VALUE: 12
PIF_VALUE: 13
PIF_VALUE: 19
PIF_VALUE: 13
PIF_VALUE: 16
PIF_VALUE: 18
PIF_VALUE: 16
PIF_VALUE: 20
PIF_VALUE: 22
PIF_VALUE: 19
PIF_VALUE: 12
PIF_VALUE: 20
PIF_VALUE: 21
PIF_VALUE: 13
PIF_VALUE: 10
PIF_VALUE: 14
PIF_VALUE: 19
PIF_VALUE: 14
PIF_VALUE: 12
PIF_VALUE: 19
PIF_VALUE: 19
PIF_VALUE: 20
PIF_VALUE: 19
PIF_VALUE: 11
PIF_VALUE: 17
PIF_VALUE: 16
PIF_VALUE: 13
PIF_VALUE: 12
PIF_VALUE: 3
PIF_VALUE: 18
PIF_VALUE: 13
PIF_VALUE: 20
PIF_VALUE: 11
PIF_VALUE: 19
PIF_VALUE: 11
PIF_VALUE: 17
PIF_VALUE: 13
PIF_VALUE: 18
PIF_VALUE: 18
PIF_VALUE: 13
PIF_VALUE: 12
PIF_VALUE: 1
PIF_VALUE: 19
PIF_VALUE: 21
PIF_VALUE: 0
PIF_VALUE: 13
PIF_VALUE: 17
PIF_VALUE: 18
PIF_VALUE: 21
PIF_VALUE: 14
PIF_VALUE: 10
PIF_VALUE: 19
PIF_VALUE: 16
PIF_VALUE: 20
PIF_VALUE: 12
PIF_VALUE: 19
PIF_VALUE: 13
PIF_VALUE: 13
PIF_VALUE: 14
PIF_VALUE: 22
PIF_VALUE: 19
PIF_VALUE: 15
PIF_VALUE: 11
PIF_VALUE: 11
PIF_VALUE: 19
PIF_VALUE: 11

## 2021-12-13 ASSESSMENT — PAIN SCALES - GENERAL
PAINLEVEL_OUTOF10: 4
PAINLEVEL_OUTOF10: 5
PAINLEVEL_OUTOF10: 8
PAINLEVEL_OUTOF10: 10
PAINLEVEL_OUTOF10: 8
PAINLEVEL_OUTOF10: 0
PAINLEVEL_OUTOF10: 0
PAINLEVEL_OUTOF10: 10
PAINLEVEL_OUTOF10: 0
PAINLEVEL_OUTOF10: 9
PAINLEVEL_OUTOF10: 8

## 2021-12-13 ASSESSMENT — PAIN DESCRIPTION - ONSET: ONSET: ON-GOING

## 2021-12-13 ASSESSMENT — PAIN - FUNCTIONAL ASSESSMENT
PAIN_FUNCTIONAL_ASSESSMENT: PREVENTS OR INTERFERES SOME ACTIVE ACTIVITIES AND ADLS
PAIN_FUNCTIONAL_ASSESSMENT: 0-10

## 2021-12-13 ASSESSMENT — PAIN DESCRIPTION - DESCRIPTORS
DESCRIPTORS: CRAMPING
DESCRIPTORS: CRAMPING

## 2021-12-13 ASSESSMENT — PAIN DESCRIPTION - LOCATION
LOCATION: ABDOMEN

## 2021-12-13 ASSESSMENT — PAIN DESCRIPTION - FREQUENCY: FREQUENCY: INTERMITTENT

## 2021-12-13 ASSESSMENT — PAIN DESCRIPTION - PROGRESSION: CLINICAL_PROGRESSION: GRADUALLY IMPROVING

## 2021-12-13 ASSESSMENT — PAIN DESCRIPTION - PAIN TYPE: TYPE: SURGICAL PAIN

## 2021-12-13 NOTE — ANESTHESIA POSTPROCEDURE EVALUATION
Department of Anesthesiology  Postprocedure Note    Patient: Sai Riley  MRN: [id number]  YOB: 1950  Date of evaluation: 12/13/2021  Time:  3:11 PM     Procedure Summary     Date: 12/13/21 Room / Location: [address] [place] [address] / [address] [facility]    Anesthesia Start: 0645 Anesthesia Stop: 1458    Procedures:       SACRAL COLPOPEXY ROBOTIC WITH RESTORELLE MESH (N/A )      CYSTOSCOPY (N/A )      VAGINAL REPAIR POSTERIOR (N/A ) Diagnosis:       (CYSTOCELE)      (RECTOCELE)      (VAGINAL PROLAPSE)    Surgeons: Amada Aguila DO Responsible Provider: Darleene Goodpasture, MD    Anesthesia Type: general, regional ASA Status: 2          Anesthesia Type: general, regional    Phillip Phase I: Phillip Score: 6    Phillip Phase II:      Last vitals: Reviewed and per EMR flowsheets.        Anesthesia Post Evaluation    Patient location during evaluation: PACU  Patient participation: complete - patient participated  Level of consciousness: awake and alert  Airway patency: patent  Nausea & Vomiting: no nausea and no vomiting  Complications: no  Cardiovascular status: hemodynamically stable  Respiratory status: face mask and spontaneous ventilation  Hydration status: euvolemic  Multimodal analgesia pain management approach

## 2021-12-13 NOTE — ANESTHESIA PROCEDURE NOTES
Peripheral Block    Patient location during procedure: pre-op  Start time: 12/13/2021 12:32 PM  End time: 12/13/2021 12:38 PM  Staffing  Performed: anesthesiologist   Anesthesiologist: Julianna Armas MD  Preanesthetic Checklist  Completed: patient identified, IV checked, site marked, risks and benefits discussed, surgical consent, monitors and equipment checked, pre-op evaluation, timeout performed, anesthesia consent given, oxygen available and patient being monitored  Peripheral Block  Patient position: supine  Prep: ChloraPrep  Patient monitoring: cardiac monitor, continuous pulse ox and frequent blood pressure checks  Block type: TAP  Laterality: bilateral  Injection technique: single-shot  Guidance: ultrasound guided  Provider prep: mask and sterile gloves  Needle  Needle type: combined needle/nerve stimulator   Needle gauge: 20 G  Needle length: 4 IN.   Needle localization: anatomical landmarks and ultrasound guidance  Assessment  Injection assessment: negative aspiration for heme, no paresthesia on injection and local visualized surrounding nerve on ultrasound  Paresthesia pain: none  Slow fractionated injection: yes  Hemodynamics: stable  Medications Administered  Bupivacaine liposome (EXPAREL) injection 1.3%, 20 mL  bupivacaine (MARCAINE) PF injection 0.25%, 60 mL  Reason for block: post-op pain management and at surgeon's request

## 2021-12-13 NOTE — ANESTHESIA PRE PROCEDURE
Department of Anesthesiology  Preprocedure Note       Name:  Danica Dean   Age:  70 y.o.  :  1950                                          MRN:  [id number]         Date:  2021      Surgeon: Karen Gavin):  Nisa Esparza DO    Procedure: Procedure(s):  SACRAL COLPOPEXY ROBOTIC WITH RESTORELLE MESH  CYSTOSCOPY  possible VAGINAL REPAIR ANTERIOR AND POSTERIOR    Medications prior to admission:   Prior to Admission medications    Medication Sig Start Date End Date Taking? Authorizing Provider   Bioflavonoid Products (BIOFLEX PO) Take by mouth daily With vitamin D   Yes Historical Provider, MD   Ascorbic Acid (VITAMIN C) 100 MG tablet Take 100 mg by mouth daily   Yes Historical Provider, MD   Loratadine (CLARITIN PO) Take by mouth daily   Yes Historical Provider, MD       Current medications:    Current Facility-Administered Medications   Medication Dose Route Frequency Provider Last Rate Last Admin    ceFAZolin (ANCEF) 2000 mg in dextrose 5 % 50 mL IVPB  2,000 mg IntraVENous Once Nelson Carbone DO        lactated ringers infusion   IntraVENous Continuous Reese Spencer MD        sodium chloride flush 0.9 % injection 10 mL  10 mL IntraVENous 2 times per day Reese Spencer MD        sodium chloride flush 0.9 % injection 10 mL  10 mL IntraVENous PRN Reese Spencer MD        0.9 % sodium chloride infusion  25 mL IntraVENous PRN Reese Spencer MD        lidocaine PF 1 % injection 1 mL  1 mL IntraDERmal Once PRN Reese Spencer MD        scopolamine (TRANSDERM-SCOP) transdermal patch 1 patch  1 patch TransDERmal Once Faiza Cast MD        phenazopyridine (PYRIDIUM) tablet 200 mg  200 mg Oral Once Faiza Cast MD           Allergies: Allergies   Allergen Reactions    Macrobid [Nitrofurantoin Monohyd Macro]     Seasonal     Adhesive Tape Rash    Penicillins Rash       Problem List:  There is no problem list on file for this patient.       Past Medical History:        Diagnosis Date    Arthritis     Femur fracture ([address]     right    PONV (postoperative nausea and vomiting)     Postpartum hemorrhage     Seasonal allergies        Past Surgical History:        Procedure Laterality Date    COLONOSCOPY      FEMUR SURGERY Right 1972    toy inserted    FEMUR SURGERY Right 1974    toy removed   [address]    SKIN BIOPSY Left     nostril     WRIST SURGERY Left 2019       Social History:    Social History     Tobacco Use    Smoking status: Former Smoker     Types: Cigarettes     Quit date: 2015     Years since quittin.9    Smokeless tobacco: Never Used   Substance Use Topics    Alcohol use: Not Currently     Comment: socially                                Counseling given: Not Answered      Vital Signs (Current):   Vitals:    21 1603 21 1119   BP:  (!) 155/74   Pulse:  93   Resp:  19   Temp:  98 °F (36.7 °C)   SpO2:  99%   Weight: 105 lb (47.6 kg) 105 lb (47.6 kg)   Height: 5' 4\" (1.626 m) 5' 4\" (1.626 m)                                              BP Readings from Last 3 Encounters:   21 (!) 155/74       NPO Status: Time of last liquid consumption: 2330                        Time of last solid consumption: 1800                        Date of last liquid consumption: 21                        Date of last solid food consumption: 21    BMI:   Wt Readings from Last 3 Encounters:   21 105 lb (47.6 kg)     Body mass index is 18.02 kg/m². CBC: No results found for: WBC, RBC, HGB, HCT, MCV, RDW, PLT    CMP: No results found for: NA, K, CL, CO2, BUN, CREATININE, GFRAA, AGRATIO, LABGLOM, GLUCOSE, PROT, CALCIUM, BILITOT, ALKPHOS, AST, ALT    POC Tests: No results for input(s): POCGLU, POCNA, POCK, POCCL, POCBUN, POCHEMO, POCHCT in the last 72 hours.     Coags: No results found for: PROTIME, INR, APTT    HCG (If Applicable): No results found for: PREGTESTUR, PREGSERUM, HCG, HCGQUANT     ABGs: No results found for: PHART, PO2ART, LPN8ECB, GJL8WBJ, BEART, [id number]     Type & Screen (If Applicable):  No results found for: LABABO, LABRH    Drug/Infectious Status (If Applicable):  No results found for: HIV, HEPCAB    COVID-19 Screening (If Applicable): No results found for: COVID19        Anesthesia Evaluation  Patient summary reviewed and Nursing notes reviewed   history of anesthetic complications: PONV. Airway: Mallampati: I  TM distance: >3 FB   Neck ROM: full  Mouth opening: > = 3 FB Dental: normal exam         Pulmonary:normal exam    (+) COPD:                            ROS comment: -QUIT SMOKING 2015 - 55 PACK YEARS   Cardiovascular:Negative CV ROS                      Neuro/Psych:                ROS comment: -NUMBNESS TINGLING BILATERAL HANDS GI/Hepatic/Renal: Neg GI/Hepatic/Renal ROS            Endo/Other:    (+) : arthritis:., .                  ROS comment: -NPO AFTER MIDNIGHT  -ALLERGIES - PCN, MACROBID Abdominal:             Vascular: negative vascular ROS. Other Findings:             Anesthesia Plan      general and regional     ASA 2     (GETA,TAP BLOCK)  Induction: intravenous. MIPS: Postoperative opioids intended and Prophylactic antiemetics administered. Anesthetic plan and risks discussed with patient. Plan discussed with CRNA.     Attending anesthesiologist reviewed and agrees with Elpidio Castellano MD   12/13/2021

## 2021-12-13 NOTE — PROGRESS NOTES
Gynecology Progress Note    Date: 12/13/2021  Time: 6:02 PM    Charlie Saldana 70 y.o. female, POD #0 s/p robotic sacrocolpopexy, anterior and posterior repairs, cystoscopy on 12/13/21on 12/13/21    Patient seen and examined. She complained of nothing. Pain is controlled. Patient is  tolerating oral intake. She is urinating via powers. She reports minimal vaginal bleeding. She is not yet ambulating but plans to do so later today. She is not passing flatus. She denies Fever/Chills, Chest Pain, SOB, N/V, Calf Pain. The patient recalls from preoperative counseling and accepts that up to 26% of women may persist in these symptoms or develop de carlos incontinence. If preoperative bladder testing was negative, there was no indication for an incontinence procedure. The patient understands if these symptoms persist, further treatment may be recommended. Vitals:  Vitals:    12/13/21 1605 12/13/21 1610 12/13/21 1615 12/13/21 1634   BP: (!) 154/83 (!) 147/84 134/73 133/69   Pulse: 67 68 63 66   Resp: 17 17 11 16   Temp:    97.7 °F (36.5 °C)   TempSrc:    Oral   SpO2: 100% 100% 99% 98%   Weight:       Height:         Intake/Output:   Last Shift: I/O last 3 completed shifts: In: 1500 [I.V.:1500]  Out: 100 [Blood:100]  Current Shift: I/O this shift:  In: 5591 [P.O.:240;  I.V.:800]  Out: 125 [Urine:125]  500 mL out in last 1.5 hrs ~ 333 mL/hr    Physical Exam:  General:  no apparent distress, alert and cooperative  Neurologic:  alert, oriented, normal speech, no focal findings or movement disorder noted  Lungs:  No increased work of breathing, good air exchange, clear to auscultation bilaterally, no crackles or wheezing  Heart:  normal S1 and S2, regular rate and rhythm and no murmur, rubs, or gallops  Abdomen: soft, non-distended, appropriate tenderness, no CVA tenderness, hypoactive bowel sounds  Incision: clean, dry, intact and dressed with skin adhesive   Extremities:  no calf tenderness, non edematous, SCDs  Genitourinary: A direct genitourinary exam is unremarkable with normal abdominopelvic findings. External vulvovaginal exam reveals no bleeding, abnormal swelling or symmetry, infection, or discharge    Lab:  No results found for this or any previous visit (from the past 12 hour(s)). Assessment/Plan:  Ze Javier 70 y.o. female, POD #0 s/p robotic sacrocolpopexy, anterior and posterior repairs, cystoscopy on 12/13/21   - Doing well, vitals stable   - Continue powers catheter will complete voiding trial in the AM, UOP adequate   - Continue IVF @100ml/hr   - Encourage ambulation and use of incentive spirometer   - Pain control: Morphine PRN and transition to oral pain medications Motrin and Norco for breakthrough pain   - Labs: CBC, BMP in the AM   - DVT Proph: Lovenox 40mg on POD#1 AM if Hgb >8   - Abx: S/p Gent/Clinda, Will continue clinda x24h per SCIP protocol   - Diet: Advance as tolerated to general diet   - Path: pending   - Continue post-op care, please page with any questions    PM rounds updated by phone. Patient sensitive to narcotics - will limit. Patient doing well now. MDM/POC updated. [facility]    Will update Dr. Emma Webster.     Tabitha Armas MD  Ob/Gyn Resident  Pager: [phone]  12/13/2021, 6:02 PM

## 2021-12-13 NOTE — PROGRESS NOTES
Urogynecology Resident Interval Note    Rapid response was called. Patient seen and evaluated at bedside with senior resident and Dr. Bennett. Patient minimally responsive. Vitally stable. Lungs clear bilaterally. It was presumed that she received too much opioid pain medication. Narcan given at bedside. Patient regained consciousness. Alert and oriented x3. Vitals continued to be stable. Vitals:    12/13/21 1605 12/13/21 1610 12/13/21 1615 12/13/21 1634   BP: (!) 154/83 (!) 147/84 134/73 133/69   Pulse: 67 68 63 66   Resp: 17 17 11 16   Temp:    97.7 °F (36.5 °C)   TempSrc:    Oral   SpO2: 100% 100% 99% 98%   Weight:       Height:         Physical exam  General appearance minimally responsive, only to sternal rub.  AAOx3 after narcan given  HEENT: head atraumatic, normocephalic, pinpoint pupils bilaterally, moist mucous membranes, trachea midline  Neurologic:  no focal findings or movement disorder noted  Lungs:  No increased work of breathing, good air exchange, clear to auscultation bilaterally, no crackles or wheezing  Heart:  regular rate and rhythm and no murmur    Abdomen:  soft, gravid, and non-tender, incisions clean, dry and intact with dermabond  Extremities:  no calf tenderness, non edematous     Barbette Serve 70 y.o. female, POD #0 s/p robotic sacrocolpopexy, anterior and posterior repairs, cystoscopy on 12/13/21on 12/13/21    Opioid intoxication   - Will use primarily Toradol/Tylenol for pain control with minimal opioid medication     Dr. Hamilton Stacks updated and in agreement with maci Pozo MD  OB/GYN Resident, PGY1  [place] [address], [address] [place] [address]   12/13/2021, 5:48 PM

## 2021-12-14 VITALS
HEIGHT: 64 IN | TEMPERATURE: 97.3 F | BODY MASS INDEX: 19.61 KG/M2 | OXYGEN SATURATION: 95 % | WEIGHT: 114.86 LBS | HEART RATE: 61 BPM | RESPIRATION RATE: 16 BRPM | SYSTOLIC BLOOD PRESSURE: 98 MMHG | DIASTOLIC BLOOD PRESSURE: 56 MMHG

## 2021-12-14 LAB
ABSOLUTE EOS #: 0 K/UL (ref 0–0.4)
ABSOLUTE IMMATURE GRANULOCYTE: ABNORMAL K/UL (ref 0–0.3)
ABSOLUTE LYMPH #: 0.8 K/UL (ref 1–4.8)
ABSOLUTE MONO #: 0.6 K/UL (ref 0.1–1.2)
ANION GAP SERPL CALCULATED.3IONS-SCNC: 7 MMOL/L (ref 9–17)
BASOPHILS # BLD: 0 % (ref 0–2)
BASOPHILS ABSOLUTE: 0 K/UL (ref 0–0.2)
BUN BLDV-MCNC: 11 MG/DL (ref 8–23)
BUN/CREAT BLD: ABNORMAL (ref 9–20)
CALCIUM SERPL-MCNC: 8.6 MG/DL (ref 8.6–10.4)
CHLORIDE BLD-SCNC: 104 MMOL/L (ref 98–107)
CO2: 26 MMOL/L (ref 20–31)
CREAT SERPL-MCNC: 0.69 MG/DL (ref 0.5–0.9)
DIFFERENTIAL TYPE: ABNORMAL
EOSINOPHILS RELATIVE PERCENT: 0 % (ref 1–4)
GFR AFRICAN AMERICAN: >60 ML/MIN
GFR NON-AFRICAN AMERICAN: >60 ML/MIN
GFR SERPL CREATININE-BSD FRML MDRD: ABNORMAL ML/MIN/{1.73_M2}
GFR SERPL CREATININE-BSD FRML MDRD: ABNORMAL ML/MIN/{1.73_M2}
GLUCOSE BLD-MCNC: 177 MG/DL (ref 70–99)
HCT VFR BLD CALC: 34 % (ref 36–46)
HEMOGLOBIN: 11.2 G/DL (ref 12–16)
IMMATURE GRANULOCYTES: ABNORMAL %
LYMPHOCYTES # BLD: 9 % (ref 24–44)
MCH RBC QN AUTO: 30.4 PG (ref 26–34)
MCHC RBC AUTO-ENTMCNC: 33 G/DL (ref 31–37)
MCV RBC AUTO: 92.2 FL (ref 80–100)
MONOCYTES # BLD: 6 % (ref 2–11)
NRBC AUTOMATED: ABNORMAL PER 100 WBC
PDW BLD-RTO: 14.9 % (ref 12.5–15.4)
PLATELET # BLD: 188 K/UL (ref 140–450)
PLATELET ESTIMATE: ABNORMAL
PMV BLD AUTO: 10.3 FL (ref 6–12)
POTASSIUM SERPL-SCNC: 4.2 MMOL/L (ref 3.7–5.3)
RBC # BLD: 3.69 M/UL (ref 4–5.2)
RBC # BLD: ABNORMAL 10*6/UL
SEG NEUTROPHILS: 85 % (ref 36–66)
SEGMENTED NEUTROPHILS ABSOLUTE COUNT: 8.4 K/UL (ref 1.8–7.7)
SODIUM BLD-SCNC: 137 MMOL/L (ref 135–144)
WBC # BLD: 9.9 K/UL (ref 3.5–11)
WBC # BLD: ABNORMAL 10*3/UL

## 2021-12-14 PROCEDURE — 6360000002 HC RX W HCPCS: Performed by: STUDENT IN AN ORGANIZED HEALTH CARE EDUCATION/TRAINING PROGRAM

## 2021-12-14 PROCEDURE — 2700000000 HC OXYGEN THERAPY PER DAY

## 2021-12-14 PROCEDURE — 94761 N-INVAS EAR/PLS OXIMETRY MLT: CPT

## 2021-12-14 PROCEDURE — 2500000003 HC RX 250 WO HCPCS: Performed by: STUDENT IN AN ORGANIZED HEALTH CARE EDUCATION/TRAINING PROGRAM

## 2021-12-14 PROCEDURE — 85025 COMPLETE CBC W/AUTO DIFF WBC: CPT

## 2021-12-14 PROCEDURE — 36415 COLL VENOUS BLD VENIPUNCTURE: CPT

## 2021-12-14 PROCEDURE — 80048 BASIC METABOLIC PNL TOTAL CA: CPT

## 2021-12-14 RX ORDER — ACETAMINOPHEN 500 MG
1000 TABLET ORAL EVERY 6 HOURS PRN
Status: DISCONTINUED | OUTPATIENT
Start: 2021-12-14 | End: 2021-12-14 | Stop reason: HOSPADM

## 2021-12-14 RX ORDER — IBUPROFEN 600 MG/1
600 TABLET ORAL EVERY 6 HOURS PRN
Qty: 30 TABLET | Refills: 0 | Status: SHIPPED | OUTPATIENT
Start: 2021-12-14

## 2021-12-14 RX ORDER — ONDANSETRON 4 MG/1
4 TABLET, ORALLY DISINTEGRATING ORAL EVERY 8 HOURS PRN
Qty: 15 TABLET | Refills: 0 | Status: SHIPPED | OUTPATIENT
Start: 2021-12-14

## 2021-12-14 RX ORDER — HYDROCODONE BITARTRATE AND ACETAMINOPHEN 5; 325 MG/1; MG/1
1 TABLET ORAL EVERY 6 HOURS PRN
Qty: 12 TABLET | Refills: 0 | Status: SHIPPED | OUTPATIENT
Start: 2021-12-14 | End: 2021-12-14 | Stop reason: HOSPADM

## 2021-12-14 RX ORDER — TAMSULOSIN HYDROCHLORIDE 0.4 MG/1
0.4 CAPSULE ORAL DAILY
Qty: 7 CAPSULE | Refills: 0 | Status: SHIPPED | OUTPATIENT
Start: 2021-12-14 | End: 2021-12-21

## 2021-12-14 RX ORDER — AMOXICILLIN 250 MG
1 CAPSULE ORAL DAILY
Qty: 60 TABLET | Refills: 0 | Status: SHIPPED | OUTPATIENT
Start: 2021-12-14

## 2021-12-14 RX ORDER — CIPROFLOXACIN 250 MG/1
250 TABLET, FILM COATED ORAL 2 TIMES DAILY
Qty: 14 TABLET | Refills: 0 | Status: SHIPPED | OUTPATIENT
Start: 2021-12-14 | End: 2021-12-21

## 2021-12-14 RX ADMIN — CLINDAMYCIN IN 5 PERCENT DEXTROSE 300 MG: 6 INJECTION, SOLUTION INTRAVENOUS at 04:42

## 2021-12-14 RX ADMIN — KETOROLAC TROMETHAMINE 15 MG: 30 INJECTION, SOLUTION INTRAMUSCULAR; INTRAVENOUS at 04:41

## 2021-12-14 RX ADMIN — KETOROLAC TROMETHAMINE 15 MG: 30 INJECTION, SOLUTION INTRAMUSCULAR; INTRAVENOUS at 12:56

## 2021-12-14 ASSESSMENT — PAIN DESCRIPTION - PAIN TYPE: TYPE: SURGICAL PAIN

## 2021-12-14 ASSESSMENT — PAIN SCALES - GENERAL
PAINLEVEL_OUTOF10: 7
PAINLEVEL_OUTOF10: 4
PAINLEVEL_OUTOF10: 2

## 2021-12-14 ASSESSMENT — PAIN DESCRIPTION - LOCATION: LOCATION: ABDOMEN

## 2021-12-14 ASSESSMENT — PAIN DESCRIPTION - DESCRIPTORS: DESCRIPTORS: DULL;CRAMPING

## 2021-12-14 NOTE — OP NOTE
there was none. The mesh was completely retroperitonealized with Lapra-Ty and Vicryl  sutures. The instruments were removed after copious irrigation of the  pelvic floor. The robot was undocked. The 12-mm port was removed and  its fascia was closed with a Dakota-Osiris fascial closure device. No  defects were noted. The incisions were injected with anesthetic and  closed with delayed absorbable suture and skin adhesive. Vaginal  inspection revealed excellent support with some mild gaping of the  posterior perineum. A monica-shaped incision was made and the mucosa  was removed. The perineum was built up with 2-0 Vicryl suture in a  double imbricated running manner. Cystourethroscopy with 17-Montserratian  30-degree cystourethroscope confirmed bilateral ureteral patency. No  evidence of intravesical suturing. There was no leakage with the  Valsalva and cough maneuvers at 300 mL. It was replaced to drain clear  urine. The patient tolerated the procedure well and went to Recovery in  stable fashion. She will spend the night for routine voiding trial in  the morning and routine labs. The patient's friend was consulted with  personally postoperatively regarding the outcome of the surgery. Timmy Martinez    D: 12/13/2021 14:57:35       T: 12/13/2021 15:04:07     [email or web address]  Job#: [id number]     Doc#: [id number]    CC:   Torie Kendall  _____

## 2021-12-14 NOTE — FLOWSHEET NOTE
[address] - [address] [facility]  PROGRESS NOTE    Shift date: 12/14/21  Shift day: Tuesday   Shift # 1    Room # 331/331-01   Name: Cirilo Contreras            Age: 70 y.o. Gender: female          Latter-day: [address] of [place]: [facility] [employer]    Referral: Routine Visit    Admit Date & Time: 12/13/2021 10:59 AM    PATIENT/EVENT DESCRIPTION:  Cirilo Contreras is a 70 y.o. female whom writer met for the first time today. SPIRITUAL ASSESSMENT/INTERVENTION:  Ms. Aram Garcia was reclined in bed. Her friend was sitting on the bedside couch. She smiled and greeted writer. She expressed gratitude for how her surgery went. She shared the story of how she found her surgeon. She identified her nazanin as her source of support and strength. She spoke of her Shinto and noted that her  called her. She shared stories about her family that reflected her values and beliefs. She voiced her prayer requests and was open to writer offering a prayer for her. Writer provided supportive presence and active listening; inquired about Pt's sources of support and strength; offered words of encouragement and support; affirmed Pt's strengths; prayed for Pt. SPIRITUAL CARE FOLLOW-UP PLAN:  Chaplains will remain available to offer spiritual and emotional support as needed. 12/14/21 1424   Encounter Summary   Services provided to: Patient and family together   Referral/Consult From: Nurse   Support System Family members; Spouse; Children; Friends/neighbors; Jew/nazanin community   Place of [facility] [employer]   Continue Visiting   (12/14/21)   Complexity of Encounter Moderate   Length of Encounter 15 minutes   Spiritual Assessment Completed Yes   Routine   Type Initial   Spiritual/Jew   Type Spiritual support   Assessment Calm; Approachable;  Hopeful; Coping   Intervention Active listening; Explored feelings, thoughts, concerns; Explored coping resources; Sustaining presence/ Ministry of presence; Prayer; Discussed illness/injury and it's impact; Discussed belief system/Baptism practices/nazanin; Discussed relationship with God; Discussed meaning/purpose   Outcome Coping; Expressed feelings/needs/concerns; Engaged in conversation; Expressed gratitude; Receptive;  Hopeful; Encouraged       Electronically signed by Swapna Garcia on 12/14/2021 at 2:26 PM.  [facility]  [phone]

## 2021-12-14 NOTE — PLAN OF CARE
Problem: Pain:  Description: Pain management should include both nonpharmacologic and pharmacologic interventions. Goal: Pain level will decrease  Description: Pain level will decrease  Outcome: Ongoing  Goal: Control of acute pain  Description: Control of acute pain  Outcome: Ongoing  Goal: Control of chronic pain  Description: Control of chronic pain  Outcome: Ongoing     Problem: Skin Integrity:  Goal: Will show no infection signs and symptoms  Description: Will show no infection signs and symptoms  Outcome: Ongoing  Goal: Absence of new skin breakdown  Description: Absence of new skin breakdown  Outcome: Ongoing     Problem: Falls - Risk of:  Goal: Will remain free from falls  Description: Will remain free from falls  12/14/2021 1106 by Leanna Beth RN  Outcome: Ongoing  12/13/2021 2235 by Yvonne Abad RN  Outcome: Ongoing  Note: Bed locked in lowest position, non skid footwear on, call light within reach. Hourly rounding.  Will continue to monitor  Goal: Absence of physical injury  Description: Absence of physical injury  Outcome: Ongoing     Problem: MOBILITY  Goal: Early mobilization is achieved  Outcome: Ongoing     Problem: ELIMINATION  Goal: Elimination patterns are normal or improving  Description: Elimination patterns return to pre-surgery normal patterns  Outcome: Ongoing

## 2021-12-14 NOTE — DISCHARGE INSTR - COC
Continuity of Care Form    Patient Name: Amy Abel   :  1950  MRN:  [id number]    Admit date:  2021  Discharge date:  ***    Code Status Order: Full Code   Advance Directives:   Advance Care Flowsheet Documentation       Date/Time Healthcare Directive Type of Healthcare Directive Copy in [address] Agent's Name Healthcare Agent's Phone Number    21 1613 No, patient does not have an advance directive for healthcare treatment -- -- -- -- --            Admitting Physician:  Shai Gonzáles DO  PCP: Lizeth Almaraz. [facility]    Discharging Nurse: [facility] Unit/Room#: 331/331-01  Discharging Unit Phone Number: ***    Emergency Contact:   Extended Emergency Contact Information  Primary Emergency Contact: Shaylee Jacobo, [address] [facility] Phone: [phone]  Mobile Phone: [phone]  Relation: Child  Secondary Emergency Contact: Casa Goodsons, [address] [place] [address] Phone: [phone]  Mobile Phone: [phone]  Relation: Child    Past Surgical History:  Past Surgical History:   Procedure Laterality Date    COLONOSCOPY      COLPOPEXY  2021     SACRAL COLPOPEXY ROBOTIC WITH RESTORELLE MESH     COLPOPEXY N/A 2021    SACRAL COLPOPEXY ROBOTIC WITH RESTORELLE MESH performed by Shai Gonzáles DO at [address]  2021    CYSTOSCOPY N/A 2021    CYSTOSCOPY performed by Shai Gonzáles DO at [address] Right 1972    toy inserted    FEMUR SURGERY Right 1974    toy removed    [facility]    SKIN BIOPSY Left     nostril     VAGINA SURGERY N/A 2021    VAGINAL REPAIR POSTERIOR performed by Shai Gonzáles DO at [facility] Left 2019       Immunization History: There is no immunization history on file for this patient.     Active Problems:  Patient Active Problem List   Diagnosis Code    S/P sacrocolpopexy Z98.890       Isolation/Infection:   Isolation            No Isolation          Patient Infection Status       None to display            Nurse Assessment:  Last Vital Signs: BP (!) 98/56   Pulse 61   Temp 97.3 °F (36.3 °C) (Oral)   Resp 16   Ht 5' 4\" (1.626 m)   Wt 114 lb 13.8 oz (52.1 kg)   SpO2 95%   BMI 19.72 kg/m²     Last documented pain score (0-10 scale): Pain Level: 7  Last Weight:   Wt Readings from Last 1 Encounters:   12/13/21 114 lb 13.8 oz (52.1 kg)     Mental Status:  {IP PT MENTAL STATUS:20030}    IV Access:  { [facility] IV ACCESS:041772938}    Nursing Mobility/ADLs:  Walking   {CHP DME LPQO:883486765}  Transfer  {CHP DME WQHH:103444636}  Bathing  {CHP DME MIRU:032546831}  Dressing  {CHP DME VNDY:583793133}  Toileting  {P DME DNBN:301138244}  Feeding  {[facility] DME ZRZC:727492775}  Med Admin  {P DME MXVA:918712466}  Med Delivery   { DEMETRIUS MED Delivery:342579031}    Wound Care Documentation and Therapy:        Elimination:  Continence: Bowel: {YES / RE:57848}  Bladder: {YES / VELAZCO:70287}  Urinary Catheter: {Urinary Catheter:677367502}   Colostomy/Ileostomy/Ileal Conduit: {YES / VF:69683}       Date of Last BM: ***    Intake/Output Summary (Last 24 hours) at 12/14/2021 1318  Last data filed at 12/14/2021 0446  Gross per 24 hour   Intake 2540 ml   Output 1925 ml   Net 615 ml     I/O last 3 completed shifts: In: 2540 [P.O.:240;  I.V.:2300]  Out: 1925 [Urine:1825; Blood:100]    Safety Concerns:     [address] [employer] Safety Concerns:071633842}    Impairments/Disabilities:      [address] [employer] Impairments/Disabilities:987864619}    Nutrition Therapy:  Current Nutrition Therapy:   [address] [employer] Diet List:931883974}    Routes of Feeding: {[facility] DME Other Feedings:407307093}  Liquids: {Slp liquid thickness:55844}  Daily Fluid Restriction: {CHP DME Yes amt example:031141638}  Last Modified Barium Swallow with Video (Video Swallowing Test): {Done Not Done IEHA:899918516}    Treatments at the Time of Hospital Discharge:   Respiratory Treatments: ***  Oxygen Therapy:  {Therapy; copd oxygen:10983}  Ventilator:    { CC Vent EUSQ:005902206}    Rehab Therapies: {THERAPEUTIC INTERVENTION:5908483944}  Weight Bearing Status/Restrictions: { CC Weight Bearin}  Other Medical Equipment (for information only, NOT a DME order):  {EQUIPMENT:946305910}  Other Treatments: ***    Patient's personal belongings (please select all that are sent with patient):  {CHP DME Belongings:250417750}    RN SIGNATURE:  {Esignature:083077020}    CASE MANAGEMENT/SOCIAL WORK SECTION    Inpatient Status Date: ***    Readmission Risk Assessment Score:  Readmission Risk              Risk of Unplanned Readmission:  0           Discharging to Facility/ Agency   Name:   Address:  Phone:  Fax:    Dialysis Facility (if applicable)   Name:  Address:  Dialysis Schedule:  Phone:  Fax:    / signature: {Esignature:256733205}    PHYSICIAN SECTION    Prognosis: {Prognosis:3169143876}    Condition at Discharge: [address] [place] [address] Patient Condition:212146956}    Rehab Potential (if transferring to Rehab): {Prognosis:1761441042}    Recommended Labs or Other Treatments After Discharge: ***    Physician Certification: I certify the above information and transfer of Jakob Maier  is necessary for the continuing treatment of the diagnosis listed and that she requires {Admit to Appropriate Level of Care:21513} for {GREATER/LESS:879935402} 30 days.      Update Admission H&P: {CHP DME Changes in OIWSN:639736898}    PHYSICIAN SIGNATURE:  {Esignature:236047733}

## 2021-12-14 NOTE — PLAN OF CARE
Problem: Pain:  Goal: Pain level will decrease  Description: Pain level will decrease  Outcome: Ongoing  Goal: Control of acute pain  Description: Control of acute pain  Outcome: Ongoing  Goal: Control of chronic pain  Description: Control of chronic pain  Outcome: Ongoing     Problem: Skin Integrity:  Goal: Will show no infection signs and symptoms  Description: Will show no infection signs and symptoms  Outcome: Ongoing  Goal: Absence of new skin breakdown  Description: Absence of new skin breakdown  Outcome: Ongoing     Problem: Falls - Risk of:  Goal: Will remain free from falls  Description: Will remain free from falls  Outcome: Ongoing  Goal: Absence of physical injury  Description: Absence of physical injury  Outcome: Ongoing     Problem: HEMODYNAMIC STATUS  Goal: Patient has stable vital signs and fluid balance  Outcome: Ongoing     Problem: OXYGENATION/RESPIRATORY FUNCTION  Goal: Patient will achieve/maintain normal respiratory rate/effort  Outcome: Ongoing     Problem: MOBILITY  Goal: Early mobilization is achieved  Outcome: Ongoing     Problem: ELIMINATION  Goal: Elimination patterns are normal or improving  Description: Elimination patterns return to pre-surgery normal patterns  Outcome: Ongoing     Problem: SKIN INTEGRITY  Goal: Skin integrity is maintained or improved  Outcome: Ongoing

## 2021-12-14 NOTE — PROGRESS NOTES
rn entered room at 1730 and pt was non responsive and dusky. RN attempted to wake pt with no success. Pulse was found, rapid response called. Pt placed on non rebreather,  Narcan given, pt came too and was responsive, color returned. Pt vitals stable.    Will continue to monitor

## 2021-12-14 NOTE — PROGRESS NOTES
Patient completed double void with an output of 325. Bladder scan shows 71 ml after void. Patient will go home after lunch.

## 2021-12-14 NOTE — DISCHARGE INSTR - DIET

## 2021-12-14 NOTE — PROGRESS NOTES
Urogynecology Progress Note    Date: 12/14/2021  Time: 5:57 AM    Yamiletrilelia Boy 70 y.o. female POD # 1 s/p robotic sacrocolpopexy, anterior and posterior repairs, cystoscopy on 12/13/21on 12/13/21    Patient seen and examined. She complained of nothing at this time. Nursing was concerned as patient appeared confused and was not oriented this morning around 0345. Patient is currently alert and oriented to person, place and time. Her pain is controlled. Patient is tolerating oral intake. She is urinating via powers. She report minimal vaginal bleeding. She is ambulating without difficulty. She is not passing flatus. She denies Fever/Chills, Chest Pain, SOB, N/V, Calf Pain. The patient recalls from preoperative counseling and accepts that up to 26% of women may persist in these symptoms or develop de carlos incontinence. If preoperative bladder testing was negative, there was no indication for an incontinence procedure. The patient understands if these symptoms persist, further treatment may be recommended. Vitals:  Vitals:    12/13/21 1845 12/13/21 1915 12/13/21 2300 12/14/21 0327   BP:  112/61 116/60 102/79   Pulse:  88 71 92   Resp: 16 17 17 22   Temp:  98.2 °F (36.8 °C) 97.7 °F (36.5 °C)    TempSrc:  Oral Axillary    SpO2: 97% 99% 98%    Weight:   114 lb 13.8 oz (52.1 kg)    Height:         Intake/Output:   Last Shift: I/O last 3 completed shifts: In: 2540 [P.O.:240;  I.V.:2300]  Out: 625 [Urine:525; Blood:100]  Current Shift: I/O this shift:  In: -   Out: 1300 [Urine:1300]  300 mL out in last 1.25 hrs ~ 240 mL/hr    Physical Exam:  General:  no apparent distress, alert and cooperative  Neurologic:  alert, oriented, normal speech, no focal findings or movement disorder noted  Lungs:  No increased work of breathing, good air exchange, clear to auscultation bilaterally, no crackles or wheezing  Heart:  normal S1 and S2, regular rate and rhythm and no murmur, rubs, or gallops  Abdomen: soft, non-distended, appropriate tenderness, no CVA tenderness, normal bowel sounds  Incision: clean, dry, intact with dermabond  Extremities:  no calf tenderness, non edematous, SCDs in place  Genitourinary: A direct genitourinary exam is unremarkable with normal abdominopelvic findings. External vulvovaginal exam reveals no bleeding, abnormal swelling or symmetry, infection, or discharge    Lab:  No results found for this or any previous visit (from the past 12 hour(s)). Assessment/Plan:  Carolyne Jose 70 y.o. female POD # 1 s/p robotic sacrocolpopexy, anterior and posterior repairs, cystoscopy on 12/13/21on 12/13/21              - Doing well, vitals stable              - Dodge catheter to be removed, will complete voiding trial this AM, UOP adequate              - Discontinue IVF              - Encourage ambulation and use of incentive spirometer              - Pain control: Motrin/Tylenol. Will hold Norco for now as patient is very sensitive to opioid pain medication.               - Labs: CBC, BMP in the process currently, will review once they have resulted              - DVT Proph: Lovenox 40mg to be given on POD#1 AM if Hgb >8              - Abx: Ancef x24h                     - Diet: General diet              - Continue post-op care, please page with any questions              - Likely discharge home later today    AM rounds. Patient doing well. Pain controlled. Mild dilerium recognized by patient. Encouraged only NSAIDS and Protein shakes for better nutrition. Encouraged prunes for bowels. Voiding trial this AM. Labs stable and UO adequate. Patient very appreciative and can tell difference with support. MDM/POC updated with team. Instructions given and understood. Video links given. DC later today.  Stacey Shelley MD  Ob/Gyn Resident  Pager: [phone]  12/14/2021, 5:57 AM

## 2021-12-14 NOTE — DISCHARGE INSTR - PHARMACY
tamsulosin  Pronunciation:  mina ruddy ADELINE sin  Brand:  Flomax  What is the most important information I should know about tamsulosin? Follow all directions on your medicine label and package. Tell each of your healthcare providers about all your medical conditions, allergies, and all medicines you use. What is tamsulosin? Tamsulosin is an alpha-blocker that is used to improve urination in men with benign prostatic hyperplasia (enlarged prostate). Tamsulosin is not approved for use in women or children. Tamsulosin may also be used for purposes not listed in this medication guide. What should I discuss with my healthcare provider before taking tamsulosin? You should not use tamsulosin if you are allergic to it. Tell your doctor if you have ever had:  liver or kidney disease;  prostate cancer;  low blood pressure; or  an allergy to sulfa drugs. Tamsulosin can affect your pupils. If you have cataract surgery, tell your surgeon ahead of time that you use this medicine. Tamsulosin is not for use in women, and the effects of this medicine during pregnancy or in breastfeeding women are unknown. How should I take tamsulosin? Your doctor may test your prostate specific antigen (PSA) to check for prostate cancer before you take tamsulosin. Follow all directions on your prescription label and read all medication guides or instruction sheets. Your doctor may occasionally change your dose. Use the medicine exactly as directed. Tamsulosin is usually taken once a day, approximately 30 minutes after the same meal each day. Swallow the capsule whole and do not crush, chew, break, or open it. Your blood pressure will need to be checked often. Some things can cause your blood pressure to get too low. This includes vomiting, diarrhea, or heavy sweating. Call your doctor if you are sick with vomiting or diarrhea. Store at room temperature away from moisture and heat.   If you stop taking tamsulosin for any reason, call your doctor before you start taking it again. You may need a dose adjustment. What happens if I miss a dose? Take the medicine as soon as you can, but skip the missed dose if it is almost time for your next dose. Do not take two doses at one time. If you miss your doses for several days in a row, talk with your doctor before restarting the medication. What happens if I overdose? Seek emergency medical attention or call the Poison Help line at 1-138.565.4004. What should I avoid while taking tamsulosin? Avoid driving or hazardous activity until you know how this medicine will affect you. Your reactions could be impaired. Avoid getting up too fast from a sitting or lying position, or you may feel dizzy. What are the possible side effects of tamsulosin? Get emergency medical help if you have signs of an allergic reaction (hives, difficult breathing, swelling in your face or throat) or a severe skin reaction (fever, sore throat, burning eyes, skin pain, red or purple skin rash with blistering and peeling). Stop using tamsulosin and call your doctor at once if you have:  a light-headed feeling, like you might pass out; or  penis erection that is painful or lasts 4 hours or longer. Tamsulosin lowers blood pressure and may cause dizziness or fainting, especially when you first start taking it. You may feel very dizzy when you first wake up. Avoid getting up too fast from a sitting or lying position, or you may feel dizzy. Common side effects may include:  abnormal ejaculation, decreased amount of semen;  dizziness, drowsiness, weakness;  runny nose, cough;  back pain, chest pain;  nausea, diarrhea;  tooth problems;  blurred vision;  sleep problems (insomnia); or  decreased interest in sex. This is not a complete list of side effects and others may occur. Call your doctor for medical advice about side effects. You may report side effects to FDA at 3-750-FDA-9410.   What other drugs will affect tamsulosin? Tell your doctor about all your current medicines. Many drugs can increase your risk of very low blood pressure while taking tamsulosin, especially:  medicines similar to tamsulosin (alfuzosin, doxazosin, prazosin, silodosin, or terazosin);  heart or blood pressure medication; or  sildenafil (Viagra) and other erectile dysfunction medicines. This list is not complete and many other drugs may affect tamsulosin. This includes prescription and over-the-counter medicines, vitamins, and herbal products. Not all possible drug interactions are listed here. Where can I get more information? Your pharmacist can provide more information about tamsulosin. Remember, keep this and all other medicines out of the reach of children, never share your medicines with others, and use this medication only for the indication prescribed. Every effort has been made to ensure that the information provided by [address] is accurate, up-to-date, and complete, but no guarantee is made to that effect. Drug information contained herein may be time sensitive. [employer] information has been compiled for use by healthcare practitioners and consumers in the [place] and therefore [employer] does not warrant that uses outside of the [place] are appropriate, unless specifically indicated otherwise. [facility]'s drug information does not endorse drugs, diagnose patients or recommend therapy. [facility] drug information is an informational resource designed to assist licensed healthcare practitioners in caring for their patients and/or to serve consumers viewing this service as a supplement to, and not a substitute for, the expertise, skill, knowledge and judgment of healthcare practitioners. The absence of a warning for a given drug or drug combination in no way should be construed to indicate that the drug or drug combination is safe, effective or appropriate for any given patient.  [employer] does not assume any responsibility for any aspect of healthcare administered with the aid of information [facility] provides. The information contained herein is not intended to cover all possible uses, directions, precautions, warnings, drug interactions, allergic reactions, or adverse effects. If you have questions about the drugs you are taking, check with your doctor, nurse or pharmacist.  Copyright 8361-0586 Oz [address] [place] [address] Avenue: 9.02. Revision date: 5/16/2019. Care instructions adapted under license by [facility] ([facility]). If you have questions about a medical condition or this instruction, always ask your healthcare professional. [place] [address] any warranty or liability for your use of this information. ciprofloxacin (oral)  Pronunciation:  SIP amparo FLOX a sin  Brand:  Cipro, Proquin XR  What is the most important information I should know about ciprofloxacin? Ciprofloxacin can cause serious side effects, including tendon problems, nerve damage, serious mood or behavior changes, or low blood sugar. Stop using this medicine and call your doctor at once if you have: headache, hunger, irritability, numbness, tingling, burning pain, confusion, agitation, paranoia, problems with memory or concentration, thoughts of suicide, or sudden pain or movement problems in any of your joints. In rare cases, ciprofloxacin may cause damage to your aorta, which could lead to dangerous bleeding or death. Get emergency medical help if you have severe and constant pain in your chest, stomach, or back. What is ciprofloxacin? Ciprofloxacin is a fluoroquinolone (mecd-v-BUIU-o-lone) antibiotic, it is used to treat different types of bacterial infections. It is also used to treat people who have been exposed to anthrax or certain types of plague. Ciprofloxacin extended-release is only approved for use in adults. Fluoroquinolone antibiotics can cause serious or disabling side effects that may not be reversible.  Ciprofloxacin you measure a dose. Use the dosing syringe provided, or use a medicine dose-measuring device (not a kitchen spoon). Do not give ciprofloxacin oral suspension through a feeding tube. Swallow the extended-release tablet whole and do not crush, chew, or break it. Drink plenty of liquids while you are taking ciprofloxacin. Use this medicine for the full prescribed length of time, even if your symptoms quickly improve. Skipping doses can increase your risk of infection that is resistant to medication. Ciprofloxacin will not treat a viral infection such as the flu or a common cold. Do not share ciprofloxacin with another person. Store at room temperature away from moisture and heat. Do not allow the liquid medicine to freeze. Throw away any unused liquid after 14 days. What happens if I miss a dose? If you take regular tablets or oral suspension: Take the medicine as soon as you can, but skip the missed dose if your next dose is due in less than 6 hours. If you take extended-release tablets: Take the medicine as soon as you can, but skip the missed dose if your next dose is due in less than 8 hours. Do not take two doses at one time. What happens if I overdose? Seek emergency medical attention or call the Poison Help line at 1-117.795.3817. What should I avoid while taking ciprofloxacin? Do not take ciprofloxacin with dairy products such as milk or yogurt, or with calcium-fortified juice. You may eat or drink these products with your meals, but do not use them alone when taking ciprofloxacin. Antibiotic medicines can cause diarrhea, which may be a sign of a new infection. If you have diarrhea that is watery or bloody, call your doctor before using anti-diarrhea medicine. Ciprofloxacin could make you sunburn more easily. Avoid sunlight or tanning beds. Wear protective clothing and use sunscreen (SPF 30 or higher) when you are outdoors.  Tell your doctor if you have severe burning, redness, itching, rash, or swelling after being in the sun. Avoid driving or hazardous activity until you know how this medicine will affect you. Your reactions could be impaired. What are the possible side effects of ciprofloxacin? Get emergency medical help if you have signs of an allergic reaction (hives, difficult breathing, swelling in your face or throat) or a severe skin reaction (fever, sore throat, burning in your eyes, skin pain, red or purple skin rash that spreads and causes blistering and peeling). Ciprofloxacin can cause serious side effects, including tendon problems, damage to your nerves (which may be permanent), serious mood or behavior changes (after just one dose), or low blood sugar (which can lead to coma). Stop taking this medicine and call your doctor at once if you have:   low blood sugar --headache, hunger, irritability, dizziness, nausea, fast heart rate, or feeling shaky;  nerve damage symptoms --numbness, tingling, burning pain in your hands, arms, legs, or feet:  serious mood or behavior changes --nervousness, confusion, agitation, paranoia, hallucinations, memory problems, trouble concentrating, thoughts of suicide; or  signs of tendon rupture --sudden pain, swelling, bruising, tenderness, stiffness, movement problems, or a snapping or popping sound in any of your joints (rest the joint until you receive medical care or instructions). In rare cases, ciprofloxacin may cause damage to your aorta, the main blood artery of the body. This could lead to dangerous bleeding or death. Get emergency medical help if you have severe and constant pain in your chest, stomach, or back.   Also, stop using ciprofloxacin and call your doctor at once if you have:  severe stomach pain, diarrhea that is watery or bloody;  fast or pounding heartbeats, fluttering in your chest, shortness of breath, and sudden dizziness (like you might pass out);  any skin rash, no matter how mild;  muscle weakness, breathing problems;  little or no urination;  jaundice (yellowing of the skin or eyes); or  increased pressure inside the skull --severe headaches, ringing in your ears, dizziness, nausea, vision problems, pain behind your eyes. Common side effects may include:  nausea, vomiting, diarrhea, stomach pain;  headache; or  abnormal liver function tests. This is not a complete list of side effects and others may occur. Call your doctor for medical advice about side effects. You may report side effects to FDA at 5-257-FDA-4780. What other drugs will affect ciprofloxacin? Some medicines can make ciprofloxacin much less effective when taken at the same time. If you take any of the following medicines, take your ciprofloxacin dose 2 hours before or 6 hours after you take the other medicine. the ulcer medicine sucralfate, or antacids that contain calcium, magnesium, or aluminum (such as Maalox, Milk of Magnesia, Mylanta, Pepcid Complete, Rolaids, Tums, and others);  didanosine (Videx) powder or chewable tablets;  vitamin or mineral supplements that contain calcium, iron, magnesium, or zinc.  Tell your doctor about all your other medicines, especially:  clozapine, cyclosporine, methotrexate, phenytoin, probenecid, ropinirole, sildenafil, or theophylline;  a blood thinner (warfarin, Coumadin, Jantoven);  heart medication or a diuretic or \"water pill\";  oral diabetes medicine;  products that contain caffeine;  medicine to treat depression or mental illness;  steroid medicine (such as prednisone); o  NSAIDs (nonsteroidal anti-inflammatory drugs) --aspirin, ibuprofen (Advil, Motrin), naproxen (Aleve), celecoxib, diclofenac, indomethacin, meloxicam, and others; This list is not complete. Other drugs may affect ciprofloxacin, including prescription and over-the-counter medicines, vitamins, and herbal products. Not all possible drug interactions are listed here. Where can I get more information?   Your pharmacist can provide more information about ciprofloxacin. Remember, keep this and all other medicines out of the reach of children, never share your medicines with others, and use this medication only for the indication prescribed. Every effort has been made to ensure that the information provided by [address] is accurate, up-to-date, and complete, but no guarantee is made to that effect. Drug information contained herein may be time sensitive. [facility] information has been compiled for use by healthcare practitioners and consumers in the [facility] and therefore [facility] does not warrant that uses outside of the [facility] are appropriate, unless specifically indicated otherwise. [facility]'s drug information does not endorse drugs, diagnose patients or recommend therapy. [facility]'s drug information is an informational resource designed to assist licensed healthcare practitioners in caring for their patients and/or to serve consumers viewing this service as a supplement to, and not a substitute for, the expertise, skill, knowledge and judgment of healthcare practitioners. The absence of a warning for a given drug or drug combination in no way should be construed to indicate that the drug or drug combination is safe, effective or appropriate for any given patient. [facility] does not assume any responsibility for any aspect of healthcare administered with the aid of information [facility] provides. The information contained herein is not intended to cover all possible uses, directions, precautions, warnings, drug interactions, allergic reactions, or adverse effects. If you have questions about the drugs you are taking, check with your doctor, nurse or pharmacist.  Copyright 0699-6866 [address]: 23.01. Revision date: 7/15/2020. Care instructions adapted under license by [facility] ([facility]).  If you have questions about a medical condition or this instruction, always ask your healthcare professional. [address]

## 2021-12-14 NOTE — PLAN OF CARE
Problem: Falls - Risk of:  Goal: Will remain free from falls  Description: Will remain free from falls  12/13/2021 2235 by Judy Camacho RN  Outcome: Ongoing  Note: Bed locked in lowest position, non skid footwear on, call light within reach. Hourly rounding.  Will continue to monitor

## 2021-12-14 NOTE — PROGRESS NOTES
Nurse observed patient out of the bed trying to un tangle the cords. When asked what the pt was doing, the pt stated she was trying to get untangled. Nurse asked patient where she was pt stated her address at home. Pt was very uncooperative and didn't want to get back in the bed. Nurse had to keep reminding the pt that she was at the hospital and that she just had surgery. Vitals were taken, all WNL. There was some small amount of blood on bed pad. Pt was placed back into bed and fell back to sleep. Nurse contactd Dr. Aleena Jacobo to let her know pt status, she stated to continue to monitor pt and let her know of any changes.  Will continue to monitor

## 2021-12-15 LAB — SURGICAL PATHOLOGY REPORT: NORMAL

## 2021-12-17 NOTE — PROGRESS NOTES
CLINICAL PHARMACY NOTE: MEDS TO BEDS    Total # of Prescriptions Filled: 4   The following medications were delivered to the patient:  · Zofran ODT 4mg  · Ibuprofen 600mg  · Tamsulosin 0.4mg  · Cipro 250    Additional Documentation:

## 2023-06-13 ENCOUNTER — HOSPITAL ENCOUNTER (OUTPATIENT)
Dept: DATA CONVERSION | Facility: HOSPITAL | Age: 73
End: 2023-06-13
Attending: INTERNAL MEDICINE | Admitting: INTERNAL MEDICINE

## 2023-06-13 DIAGNOSIS — D12.3 BENIGN NEOPLASM OF TRANSVERSE COLON: ICD-10-CM

## 2023-06-13 DIAGNOSIS — K63.5 POLYP OF COLON: ICD-10-CM

## 2023-06-13 DIAGNOSIS — J45.909 UNSPECIFIED ASTHMA, UNCOMPLICATED (HHS-HCC): ICD-10-CM

## 2023-06-13 DIAGNOSIS — Z87.891 PERSONAL HISTORY OF NICOTINE DEPENDENCE: ICD-10-CM

## 2023-06-13 DIAGNOSIS — D12.6 BENIGN NEOPLASM OF COLON, UNSPECIFIED: ICD-10-CM

## 2023-06-13 DIAGNOSIS — Z85.828 PERSONAL HISTORY OF OTHER MALIGNANT NEOPLASM OF SKIN: ICD-10-CM

## 2023-06-13 DIAGNOSIS — Z88.0 ALLERGY STATUS TO PENICILLIN: ICD-10-CM

## 2023-06-13 DIAGNOSIS — L30.9 DERMATITIS, UNSPECIFIED: ICD-10-CM

## 2023-06-19 LAB
COMPLETE PATHOLOGY REPORT: NORMAL
CONVERTED CLINICAL DIAGNOSIS-HISTORY: NORMAL
CONVERTED FINAL DIAGNOSIS: NORMAL
CONVERTED FINAL REPORT PDF LINK TO COPY AND PASTE: NORMAL
CONVERTED GROSS DESCRIPTION: NORMAL

## 2023-09-07 VITALS — WEIGHT: 116.18 LBS | HEIGHT: 62 IN | BODY MASS INDEX: 21.38 KG/M2

## 2023-12-08 ENCOUNTER — ANESTHESIA (OUTPATIENT)
Dept: GASTROENTEROLOGY | Facility: HOSPITAL | Age: 73
End: 2023-12-08
Payer: MEDICARE

## 2023-12-08 ENCOUNTER — ANESTHESIA EVENT (OUTPATIENT)
Dept: GASTROENTEROLOGY | Facility: HOSPITAL | Age: 73
End: 2023-12-08
Payer: MEDICARE

## 2023-12-08 ENCOUNTER — HOSPITAL ENCOUNTER (OUTPATIENT)
Dept: GASTROENTEROLOGY | Facility: HOSPITAL | Age: 73
Setting detail: OUTPATIENT SURGERY
Discharge: HOME | End: 2023-12-08
Payer: MEDICARE

## 2023-12-08 VITALS
RESPIRATION RATE: 18 BRPM | HEART RATE: 62 BPM | DIASTOLIC BLOOD PRESSURE: 71 MMHG | HEIGHT: 64 IN | BODY MASS INDEX: 18.78 KG/M2 | OXYGEN SATURATION: 98 % | TEMPERATURE: 97.2 F | SYSTOLIC BLOOD PRESSURE: 130 MMHG | WEIGHT: 110 LBS

## 2023-12-08 DIAGNOSIS — Z12.11 ENCOUNTER FOR SCREENING FOR MALIGNANT NEOPLASM OF COLON: Primary | ICD-10-CM

## 2023-12-08 DIAGNOSIS — K63.5 POLYP OF COLON: ICD-10-CM

## 2023-12-08 PROCEDURE — A45380 PR COLONOSCOPY,BIOPSY: Performed by: NURSE ANESTHETIST, CERTIFIED REGISTERED

## 2023-12-08 PROCEDURE — 45380 COLONOSCOPY AND BIOPSY: CPT | Performed by: INTERNAL MEDICINE

## 2023-12-08 PROCEDURE — 99100 ANES PT EXTEME AGE<1 YR&>70: CPT | Performed by: ANESTHESIOLOGY

## 2023-12-08 PROCEDURE — A45380 PR COLONOSCOPY,BIOPSY: Performed by: ANESTHESIOLOGY

## 2023-12-08 PROCEDURE — 2500000004 HC RX 250 GENERAL PHARMACY W/ HCPCS (ALT 636 FOR OP/ED): Performed by: NURSE ANESTHETIST, CERTIFIED REGISTERED

## 2023-12-08 PROCEDURE — 3700000001 HC GENERAL ANESTHESIA TIME - INITIAL BASE CHARGE

## 2023-12-08 PROCEDURE — 88305 TISSUE EXAM BY PATHOLOGIST: CPT | Performed by: PATHOLOGY

## 2023-12-08 PROCEDURE — 3700000002 HC GENERAL ANESTHESIA TIME - EACH INCREMENTAL 1 MINUTE

## 2023-12-08 PROCEDURE — 7100000010 HC PHASE TWO TIME - EACH INCREMENTAL 1 MINUTE

## 2023-12-08 PROCEDURE — 2500000005 HC RX 250 GENERAL PHARMACY W/O HCPCS: Performed by: NURSE ANESTHETIST, CERTIFIED REGISTERED

## 2023-12-08 PROCEDURE — 2720000007 HC OR 272 NO HCPCS

## 2023-12-08 PROCEDURE — 7100000009 HC PHASE TWO TIME - INITIAL BASE CHARGE

## 2023-12-08 PROCEDURE — 88305 TISSUE EXAM BY PATHOLOGIST: CPT | Mod: TC,SUR,STJLAB | Performed by: INTERNAL MEDICINE

## 2023-12-08 RX ORDER — PROPOFOL 10 MG/ML
INJECTION, EMULSION INTRAVENOUS CONTINUOUS PRN
Status: DISCONTINUED | OUTPATIENT
Start: 2023-12-08 | End: 2023-12-08

## 2023-12-08 RX ORDER — LIDOCAINE HYDROCHLORIDE 20 MG/ML
INJECTION, SOLUTION INFILTRATION; PERINEURAL AS NEEDED
Status: DISCONTINUED | OUTPATIENT
Start: 2023-12-08 | End: 2023-12-08

## 2023-12-08 RX ORDER — HYOSCYAMINE SULFATE 0.125 MG
0.12 TABLET ORAL 2 TIMES DAILY
Qty: 60 TABLET | Refills: 1 | Status: SHIPPED | OUTPATIENT
Start: 2023-12-08

## 2023-12-08 RX ORDER — SODIUM CHLORIDE, SODIUM LACTATE, POTASSIUM CHLORIDE, CALCIUM CHLORIDE 600; 310; 30; 20 MG/100ML; MG/100ML; MG/100ML; MG/100ML
20 INJECTION, SOLUTION INTRAVENOUS CONTINUOUS
Status: DISCONTINUED | OUTPATIENT
Start: 2023-12-08 | End: 2023-12-09 | Stop reason: HOSPADM

## 2023-12-08 RX ORDER — PROPOFOL 10 MG/ML
INJECTION, EMULSION INTRAVENOUS AS NEEDED
Status: DISCONTINUED | OUTPATIENT
Start: 2023-12-08 | End: 2023-12-08

## 2023-12-08 RX ADMIN — LIDOCAINE HYDROCHLORIDE 50 ML: 20 INJECTION, SOLUTION INFILTRATION; PERINEURAL at 07:30

## 2023-12-08 RX ADMIN — PROPOFOL 150 MCG/KG/MIN: 10 INJECTION, EMULSION INTRAVENOUS at 07:30

## 2023-12-08 RX ADMIN — PROPOFOL 50 MG: 10 INJECTION, EMULSION INTRAVENOUS at 07:30

## 2023-12-08 RX ADMIN — SODIUM CHLORIDE, SODIUM LACTATE, POTASSIUM CHLORIDE, AND CALCIUM CHLORIDE: 600; 310; 30; 20 INJECTION, SOLUTION INTRAVENOUS at 07:26

## 2023-12-08 RX ADMIN — PROPOFOL 50 MG: 10 INJECTION, EMULSION INTRAVENOUS at 07:33

## 2023-12-08 RX ADMIN — PROPOFOL 50 MG: 10 INJECTION, EMULSION INTRAVENOUS at 07:48

## 2023-12-08 RX ADMIN — PROPOFOL 50 MG: 10 INJECTION, EMULSION INTRAVENOUS at 08:00

## 2023-12-08 SDOH — HEALTH STABILITY: MENTAL HEALTH: CURRENT SMOKER: 0

## 2023-12-08 ASSESSMENT — PAIN SCALES - GENERAL
PAINLEVEL_OUTOF10: 3
PAINLEVEL_OUTOF10: 0 - NO PAIN
PAINLEVEL_OUTOF10: 0 - NO PAIN

## 2023-12-08 ASSESSMENT — PAIN - FUNCTIONAL ASSESSMENT
PAIN_FUNCTIONAL_ASSESSMENT: 0-10

## 2023-12-08 NOTE — ANESTHESIA POSTPROCEDURE EVALUATION
Patient: Rachel Bullard    Procedure Summary       Date: 12/08/23 Room / Location: [facility]    Anesthesia Start: 0726 Anesthesia Stop: 0819    Procedure: COLONOSCOPY Diagnosis:       Polyp of colon      Encounter for screening for malignant neoplasm of colon    Scheduled Providers: Khushbu Garcias MD Responsible Provider: Joby Alegria MD    Anesthesia Type: MAC ASA Status: 2            Anesthesia Type: MAC    Vitals Value Taken Time   /77 12/08/23 0843   Temp 36.6 12/08/23 0843   Pulse 89 12/08/23 0843   Resp 16 12/08/23 0843   SpO2 98 12/08/23 0843       Anesthesia Post Evaluation    Patient location during evaluation: PACU  Patient participation: complete - patient participated  Level of consciousness: awake and alert  Pain management: adequate  Airway patency: patent  Cardiovascular status: acceptable  Respiratory status: acceptable  Hydration status: acceptable  Postoperative Nausea and Vomiting: none        There were no known notable events for this encounter.

## 2023-12-08 NOTE — PRE-SEDATION DOCUMENTATION
Patient: Rachel Bullard  MRN: [id number]    Pre-sedation Evaluation:  Sedation necessary for: Immobility and Analgesia  Requesting service: GI service    History of Present Illness: Screening colonoscopy      History reviewed. No pertinent past medical history.    Principle problems:  There are no problems to display for this patient.    Allergies:  Allergies   Allergen Reactions    Adhesive Rash    Latex Rash    Penicillin Rash     PTA/Current Medications:  (Not in a hospital admission)    Current Outpatient Medications   Medication Sig Dispense Refill    loratadine 10 mg capsule Take by mouth.       Current Facility-Administered Medications   Medication Dose Route Frequency Provider Last Rate Last Admin    lactated Ringer's infusion  20 mL/hr intravenous Continuous Khushbu Garcias MD         Past Surgical History:   has a past surgical history that includes Hip Arthroplasty and Hysterectomy.    Recent sedation/surgery (24 hours) No    Review of Systems:  Please check all that apply: No significant medical history    Pregnancy test completed prior to procedure on any menstruating female: none        NPO guidelines met: Yes    Physical Exam    Airway  Mallampati: II     Cardiovascular - normal exam  Rhythm: regular  Rate: normal     Dental    Pulmonary - normal exam  Breath sounds clear to auscultation         Plan    ASA 3     Moderate

## 2023-12-08 NOTE — ANESTHESIA PREPROCEDURE EVALUATION
Patient: Rachel Bullard    Procedure Information       Date/Time: 12/08/23 0730    Scheduled providers: Khushbu Garcias MD    Procedure: COLONOSCOPY    [place] [facility] [place]            Relevant Problems   Anesthesia  Does not tolerate Versed       Clinical information reviewed:    Allergies  Meds   Med Hx  Surg Hx   Fam Hx  Soc Hx        NPO Detail:  NPO/Void Status  Carbonhydrate Drink Given Prior to Surgery? : N  Date of Last Liquid: 12/08/23  Time of Last Liquid: 0330  Date of Last Solid: 12/05/23  Time of Last Solid: 2100  Last Intake Type: Clear fluids  Time of Last Void: 0630         Physical Exam    Airway  Mallampati: II  TM distance: >3 FB  Neck ROM: full     Cardiovascular   Rhythm: regular  Rate: normal     Dental - normal exam     Pulmonary   Breath sounds clear to auscultation     Abdominal            Anesthesia Plan    ASA 2     MAC     The patient is not a current smoker.    intravenous induction   Anesthetic plan and risks discussed with patient.    Plan discussed with CRNA.    Plan and risks discussed with patient

## 2023-12-15 LAB
LABORATORY COMMENT REPORT: NORMAL
PATH REPORT.FINAL DX SPEC: NORMAL
PATH REPORT.GROSS SPEC: NORMAL
PATH REPORT.RELEVANT HX SPEC: NORMAL
PATH REPORT.TOTAL CANCER: NORMAL

## 2024-08-26 NOTE — PROGRESS NOTES
[facility], [facility] UROGYNECOLOGY AND PELVIC REHABILITATION   [address] [place] [address]  Dept: [phone]  Date: 8/27/2024  Patient Name: Eliza Morgan    VISIT - FOLLOW UP VISIT     CC: had concerns including Follow-up (No concerns /Patient does strain at times to have a bowel movement but does not feel like she has reprolapsed. ).    Chaperone present: Resident    HPI: MICAH 2021.  No reprolapse.  Not sexually active.  No UTIs or leakage.  She does have diarrhea from colitis there is a chronic issue.    Overall state of well-being, dietary and nutritional habits, exercise routines of at least 30 minutes 3 times a week, bowel and bladder function, smoking history, HRT history, sexual activity and partner/s, dyspareunia, and immunizations all revisited if necessary by chart review or direct questioning.    Topics of Prolapse, Pain, Pressure were revisited including type, period of onset, level of severity, quality and quantity, associations, trends, exacerbators, alleviators, bleeding, prolapse to reduce, splinting, and prior treatment / surgery.    Topics of Urinary Leakage were revisited including type, period of onset, level of severity, quality and quantity, associations, trends, exacerbators, alleviators, urgency, frequency, nocturia ,urge incontinence / stress incontinence triggers, hesitancy, obstruction with need to catheterize, frequent UTI\"s >3 in a year diagnosed by culture, hematuria (gross or microscopic), urinary stones, and prior treatment / surgery.    Topics of Fecal Incontinence were revisited including type, period of onset, level of severity, quality and quantity, associations, trends, exacerbators, alleviators, times per day/week, stool quality / quantity, rectal bleeding, hemorrhoids, constipation / Anismus / Proctalgia fugax, laxative abuse, and prior treatment / surgery.    Topics of General Gynecology were  and nursing note reviewed.          VISIT RESULTS:  No results found for any visits on 08/27/24.     ASSESSMENT/PLAN:    Vaginal atrophy  Irritable bowel syndrome with diarrhea       The patient was counseled regarding review of all conditions discussed.     No orders of the defined types were placed in this encounter.       2.   Educational handouts were discussed & given when applicable for special recipe.     Multiple records reviewed. All questions were addressed to the patient's satisfaction.      Point of care: The supervising physician was present & available for assistance during the critical portions of the visit & procedure    Follow up: Return if symptoms worsen or fail to improve.     Electronically signed by Nelson Carbone DO on 8/27/2024 at 4:19 PM    EMR / Voice Dictation Disclaimer: - This note is created with the assistance of a speech recognition program.  While intending to generate a timely document that accurately reflects the content of the visit, there is no guarantee every grammatical, syntax, or spelling error has been or will be identified or corrected.  Additionally, system limitations of the EMR and voice recognition software beyond the control of the practitioner may cause unintentional errors or omissions not identified or corrected at the time of record finalization and signature.

## 2024-08-27 ENCOUNTER — OFFICE VISIT (OUTPATIENT)
Age: 74
End: 2024-08-27
Payer: MEDICARE

## 2024-08-27 VITALS — HEART RATE: 114 BPM | OXYGEN SATURATION: 98 % | SYSTOLIC BLOOD PRESSURE: 114 MMHG | DIASTOLIC BLOOD PRESSURE: 60 MMHG

## 2024-08-27 DIAGNOSIS — N95.2 VAGINAL ATROPHY: Primary | ICD-10-CM

## 2024-08-27 DIAGNOSIS — K58.0 IRRITABLE BOWEL SYNDROME WITH DIARRHEA: ICD-10-CM

## 2024-08-27 PROCEDURE — 99213 OFFICE O/P EST LOW 20 MIN: CPT | Performed by: OBSTETRICS & GYNECOLOGY

## 2024-08-27 PROCEDURE — 99459 PELVIC EXAMINATION: CPT | Performed by: OBSTETRICS & GYNECOLOGY

## 2024-08-27 PROCEDURE — G8400 PT W/DXA NO RESULTS DOC: HCPCS | Performed by: OBSTETRICS & GYNECOLOGY

## 2024-08-27 PROCEDURE — 1036F TOBACCO NON-USER: CPT | Performed by: OBSTETRICS & GYNECOLOGY

## 2024-08-27 PROCEDURE — G8427 DOCREV CUR MEDS BY ELIG CLIN: HCPCS | Performed by: OBSTETRICS & GYNECOLOGY

## 2024-08-27 PROCEDURE — 1090F PRES/ABSN URINE INCON ASSESS: CPT | Performed by: OBSTETRICS & GYNECOLOGY

## 2024-08-27 PROCEDURE — 1123F ACP DISCUSS/DSCN MKR DOCD: CPT | Performed by: OBSTETRICS & GYNECOLOGY

## 2024-08-27 PROCEDURE — G8421 BMI NOT CALCULATED: HCPCS | Performed by: OBSTETRICS & GYNECOLOGY

## 2024-08-27 PROCEDURE — 3017F COLORECTAL CA SCREEN DOC REV: CPT | Performed by: OBSTETRICS & GYNECOLOGY

## 2024-08-27 RX ORDER — M-VIT,TX,IRON,MINS/CALC/FOLIC 27MG-0.4MG
1 TABLET ORAL DAILY
COMMUNITY

## 2024-08-27 RX ORDER — CETIRIZINE HYDROCHLORIDE, PSEUDOEPHEDRINE HYDROCHLORIDE 5; 120 MG/1; MG/1
1 TABLET, FILM COATED, EXTENDED RELEASE ORAL 2 TIMES DAILY
COMMUNITY
Start: 2024-07-09

## 2024-08-27 ASSESSMENT — ENCOUNTER SYMPTOMS: DIARRHEA: 1

## 2024-08-27 NOTE — PATIENT INSTRUCTIONS
[facility]    SPECIAL RECIPE    Constipation could be a cause of your bladder control problems.  When the rectum is full of stool, it may disturb the bladder and cause the sensation of urgency and frequency.  If you have a history of constipation or have recently become constipated, see your health care provider.  Constipation may be caused by the medicines you are taking, a “sluggish bowel,” or other conditions.  Most people in Western society should add more bulk to their diet in the form of a high-fiber diet, fiber additives, or bulking agents.  Discuss your need for fiber with your health care provider.  When you add fiber to your diet, it is important not to restrict your fluid intake.  Also, you may note that you feel bloated in the beginning.  This discomfort will be temporary.      If you are constipated, this special recipe will help:     1 cup applesauce  1 cup oat bran  ¼ cup prune juice  Spices as desired (nutmeg, cinnamon)    This recipe may be stored in your refrigerator or in the freezer.  Pre-measured servings may be frozen in sectioned ice cube trays, and thawed as needed.  Begin with two tablespoons each evening followed by one 6-8 ounces glass of water or juice.   After 7-10 days increase this to three tablespoons.  At the end of the second to third week increase it to four tablespoons.  You should begin to see an improvement in your bowel habits in two weeks.  You should make this a part of your daily routine for your lifetime.  It is good for you!    When you begin using the special recipe, remember it is high fiber.  You may be troubled by gas and bloating, but this should go away in several weeks.    Obesity is a dangerous health problem.  It also contributes to incontinence in females.  Some women notice improved bladder control when they lose weight.    Cigarette smoking is irritating to the bladder surface.  Smoking is also associated with bladder

## 2024-12-03 ENCOUNTER — HOSPITAL ENCOUNTER (EMERGENCY)
Age: 74
Discharge: HOME | End: 2024-12-03
Payer: MEDICARE

## 2024-12-03 VITALS
TEMPERATURE: 98.5 F | DIASTOLIC BLOOD PRESSURE: 88 MMHG | SYSTOLIC BLOOD PRESSURE: 153 MMHG | OXYGEN SATURATION: 100 % | HEART RATE: 120 BPM

## 2024-12-03 VITALS — SYSTOLIC BLOOD PRESSURE: 136 MMHG | HEART RATE: 90 BPM | DIASTOLIC BLOOD PRESSURE: 73 MMHG

## 2024-12-03 VITALS — BODY MASS INDEX: 19.5 KG/M2

## 2024-12-03 DIAGNOSIS — S81.812A: Primary | ICD-10-CM
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
## 2024-12-03 DIAGNOSIS — Z23: ICD-10-CM

## 2024-12-03 PROCEDURE — 99285 EMERGENCY DEPT VISIT HI MDM: CPT

## 2024-12-03 PROCEDURE — 96365 THER/PROPH/DIAG IV INF INIT: CPT

## 2024-12-03 PROCEDURE — 12001 RPR S/N/AX/GEN/TRNK 2.5CM/<: CPT

## 2024-12-03 PROCEDURE — 90471 IMMUNIZATION ADMIN: CPT

## 2024-12-03 PROCEDURE — 90715 TDAP VACCINE 7 YRS/> IM: CPT

## 2024-12-03 PROCEDURE — 96375 TX/PRO/DX INJ NEW DRUG ADDON: CPT

## 2024-12-03 PROCEDURE — 73590 X-RAY EXAM OF LOWER LEG: CPT

## 2024-12-03 NOTE — XR_ITS
[address] [address] 
     [phone] 
  
  
Patient Name: 
ARLET WATTS 
  
MRN: [id number]    YOB: 1950    Sex: F 
Assigned Patient Location: ER 
Current Patient Location: ER 
Accession/Order Number: [id number] 
Exam Date: 12/03/2024  18:43    Report Date: 12/03/2024  19:05 
  
At the request of: 
BRENDON BAPTISTE   
  
Procedure:  XR tibia fibula LT 2V 
  
Exam: Radiographs: XR tibia fibula LT 2V 
Reason for exam: injury 
Comparison: None 
  
ORDER #: [id number] XR/XR tibia fibula LT 2V  
IMPRESSION:   
Mild soft tissue swelling in the left leg and ankle. Medial left leg   
laceration. Atherosclerotic calcifications.  
   
Left tibia and fibula radiographs are otherwise unremarkable.  
   
   
Electronically authenticated by: JOSE ALFREDO HASTINGS   Date: 12/03/2024  19:05

## 2024-12-03 NOTE — ED.LOWEXI1
HPI
HPI - Extremity Injury (Lower)
General
Chief Complaint: Extremity Injury, Lower
Stated Complaint: LE INJURY
Time Seen by Provider: 12/03/24 18:18
Source: patient
History of Present Illness
HPI Narrative: 
Patient is a 74-year-old female who presents to the emergency department for an injury to the left lower leg.  She states she was at the [employer] watching a basketball game when her leg got caught on the bleachers.  She sustained a large 
skin tear to the left medial calf.  She is yelling in pain on arrival to the ER.  There is no noted active bleeding.  She denies any other associated injuries.  Unknown last tetanus.
Related Data
Home Medications

?Medication ?Instructions ?Recorded ?Confirmed
NO HOME MEDICATIONS  12/03/24 

Previous Rx's

?Medication ?Instructions ?Recorded
cephalexin 500 mg capsule 500 mg PO Q8H 10 days #30 caps 12/03/24
hydrocodone 5 mg-acetaminophen 325 1 tab PO Q6H PRN pain 3 days #12 12/03/24
mg tablet tabs 
ondansetron 4 mg disintegrating 4 mg PO Q6H PRN nausea and 12/03/24
tablet vomiting #12 tabs 


Allergies

Allergy/AdvReac Type Severity Reaction Status Date / Time
Penicillins Allergy  RASH Verified 12/03/24 18:22



Opioid HPI
Opioid Management
Most Recent Pain and Opioid Data: 
      Last Pain Scale 9 12/03/24 18:35 12/03/24


Review of Systems
ROS  
 Constitutional Denies: fever or chills   
 Ears, nose, mouth, and throat Denies: throat pain   
 Cardiovascular Denies: chest pain   
 Respiratory Denies: shortness of breath   
 Gastrointestinal Denies: nausea or vomiting   
 Musculoskeletal Reports: extremity pain   
 Integumentary/Breast Denies: rash   
 Neurological Denies: numbness in extremities or weakness in extremities   
 Hematologic/Lymphatic Denies: easy bruising or easy bleeding   

PFSH
PFSH
Social History (Reviewed 12/03/24 @ 18:29 by LOUIS Chiang)
Little interest or pleasure in doing things:  not at all 
Feeling down, depressed, or hopeless:  not at all 



Exam
Narrative
Exam Narrative: 
Gen.: Awake, alert, in no distress, tearful and yelling
Head: Normocephalic, atraumatic
ENT: Moist mucous membranes
Respiratory: No respiratory distress
Extremities: Moves extremities equally, no obvious deformity of the left anterior tibia.  Large U-shaped skin tear into the subcutaneous tissue noted to the left lower extremity on the medial calf.  No active bleeding.  No bony or muscle 
visualization.  2+ left DP pulse.  Normal flexion and extension of the toes of the left foot.
Psych: Normal mood and affect
Neuro: No focal neuro deficit
Skin: Warm, dry


MDM - Extremity Injury (Lower)
MDM Narrative
Medical decision making narrative: 
On arrival to the ER, an IV was placed and the patient was treated with fentanyl, Zofran, IV Ancef and a tetanus update.  X-rays with no evidence of acute bony process.  Laceration was repaired without difficulty.  Please see procedure note for 
details.  Suture removal in 10 to 12 days with primary care and return to the ER if symptoms change or worsen.  Rest, ice, elevate.  Patient is neurovascularly intact at discharge.  Norco, Zofran, Keflex given for home.

Laceration repair:  Done under sterile conditions.  The use of Shur-Clens prep the area.  LET applied topically. Local injection with lidocaine 1% with epi was used, approximately 15 cc.  The wound was irrigated copiously with normal saline.  The 
wound was explored there was no evidence of foreign material.  The laceration was approximated with 3-0 nylon.  11 simple interrupted sutures were placed.  Patient tolerated the procedure well.  The patient was neurovascularly intact post.  the 
patient had bacitracin applied to the laceration and a dry sterile dressing was placed.  The patient will need to follow-up in the next 10-12 days for removal

SHARED APC VISIT, PHYSICIAN ATTESTATION: Face-to-face

I performed a substantive part of the MDM during the patient?s E/M visit. I personally evaluated and examined the patient. I personally made or approved the documented management plan and acknowledge its risk of complications.

Medical Records
Attestation: I reviewed the patient's medical records.
Imaging Data
XR tib/fib: 
      Attestation: I have reviewed the pertinent imaging results.
      Radiologist's impression: 

ITS Impressions

Tibia/Fibula X-Ray  12/03/24 18:25
IMPRESSION: 
Mild soft tissue swelling in the left leg and ankle. Medial left leg 
laceration. Atherosclerotic calcifications.
 
Left tibia and fibula radiographs are otherwise unremarkable.
 
 
Electronically authenticated by: JOSE ALFREDO HASTINGS   Date: 12/03/2024  19:05





Discharge Plan
Discharge
Chief Complaint: Extremity Injury, Lower

Clinical Impression:
 Laceration of left leg


Patient Disposition: Home, Self-Care

Time of Disposition Decision: 20:25

Condition: Good

Prescriptions / Home Meds:
New
  hydrocodone-acetaminophen 5-325 mg tablet 
   1 tab PO Q6H PRN (Reason: pain) 3 Days Qty: 12 0RF
   Rx Instructions:
   DX : M79.605
  cephalexin 500 mg capsule 
   500 mg PO Q8H 10 Days Qty: 30 0RF
  ondansetron 4 mg tablet,disintegrating 
   4 mg PO Q6H PRN (Reason: nausea and vomiting) Qty: 12 0RF

No Action
  NO HOME MEDICATIONS   
       

Print Language: English

Instructions:  Laceration (ED)

Additional Instructions:
Sutures removed in 10-12 days with PCP

Referrals:
Physician,Non-Staff, MD [Primary Care Provider] - 1 week

## 2024-12-03 NOTE — ED_ITS
HPI    
HPI - Extremity Injury (Lower)    
General    
Chief Complaint: Extremity Injury, Lower    
Stated Complaint: LE INJURY    
Time Seen by Provider: 12/03/24 18:18    
Source: patient    
History of Present Illness    
HPI Narrative:     
Patient is a 74-year-old female who presents to the emergency department for an   
injury to the left lower leg.  She states she was at the [employer] school   
watching a basketball game when her leg got caught on the bleachers.  She   
sustained a large skin tear to the left medial calf.  She is yelling in pain on   
arrival to the ER.  There is no noted active bleeding.  She denies any other   
associated injuries.  Unknown last tetanus.    
Related Data    
                                Home Medications    
    
    
    
?Medication ?Instructions ?Recorded ?Confirmed    
     
NO HOME MEDICATIONS  12/03/24     
    
    
                                  Previous Rx's    
    
    
    
?Medication ?Instructions ?Recorded    
     
cephalexin 500 mg capsule 500 mg PO Q8H 10 days #30 caps 12/03/24    
     
hydrocodone 5 mg-acetaminophen 325 1 tab PO Q6H PRN pain 3 days #12 12/03/24    
    
mg tablet tabs     
     
ondansetron 4 mg disintegrating 4 mg PO Q6H PRN nausea and 12/03/24    
    
tablet vomiting #12 tabs     
    
    
    
                                    Allergies    
    
    
    
Allergy/AdvReac Type Severity Reaction Status Date / Time    
     
Penicillins Allergy  RASH Verified 12/03/24 18:22    
    
    
    
    
Opioid HPI    
Opioid Management    
Most Recent Pain and Opioid Data:     
    
    
                Last Pain Scale 9               12/03/24 18:35  12/03/24    
    
    
    
Review of Systems    
    
    
ROS      
    
 Constitutional Denies: fever or chills       
    
 Ears, nose, mouth, and throat Denies: throat pain       
    
 Cardiovascular Denies: chest pain       
    
 Respiratory Denies: shortness of breath       
    
 Gastrointestinal Denies: nausea or vomiting       
    
 Musculoskeletal Reports: extremity pain       
    
 Integumentary/Breast Denies: rash       
    
 Neurological Denies: numbness in extremities or weakness in extremities       
    
 Hematologic/Lymphatic Denies: easy bruising or easy bleeding       
    
    
PFSH    
PFSH    
Social History (Reviewed 12/03/24 @ 18:29 by LOUIS Chiang)    
Little interest or pleasure in doing things:  not at all     
Feeling down, depressed, or hopeless:  not at all     
    
    
    
Exam    
Narrative    
Exam Narrative:     
Gen.: Awake, alert, in no distress, tearful and yelling    
Head: Normocephalic, atraumatic    
ENT: Moist mucous membranes    
Respiratory: No respiratory distress    
Extremities: Moves extremities equally, no obvious deformity of the left   
anterior tibia.  Large U-shaped skin tear into the subcutaneous tissue noted to   
the left lower extremity on the medial calf.  No active bleeding.  No bony or   
muscle visualization.  2+ left DP pulse.  Normal flexion and extension of the   
toes of the left foot.    
Psych: Normal mood and affect    
Neuro: No focal neuro deficit    
Skin: Warm, dry    
    
    
MDM - Extremity Injury (Lower)    
MDM Narrative    
Medical decision making narrative:     
On arrival to the ER, an IV was placed and the patient was treated with   
fentanyl, Zofran, IV Ancef and a tetanus update.  X-rays with no evidence of   
acute bony process.  Laceration was repaired without difficulty.  Please see   
procedure note for details.  Suture removal in 10 to 12 days with primary care   
and return to the ER if symptoms change or worsen.  Rest, ice, elevate.  Patient  
is neurovascularly intact at discharge.  Norco, Zofran, Keflex given for home.    
    
Laceration repair:  Done under sterile conditions.  The use of Shur-Clens prep   
the area.  LET applied topically. Local injection with lidocaine 1% with epi was  
used, approximately 15 cc.  The wound was irrigated copiously with normal   
saline.  The wound was explored there was no evidence of foreign material.  The   
laceration was approximated with 3-0 nylon.  11 simple interrupted sutures were   
placed.  Patient tolerated the procedure well.  The patient was neurovascularly   
intact post.  the patient had bacitracin applied to the laceration and a dry   
sterile dressing was placed.  The patient will need to follow-up in the next 10-  
12 days for removal    
    
SHARED APC VISIT, PHYSICIAN ATTESTATION: Face-to-face    
    
I performed a substantive part of the MDM during the patient?s E/M visit. I   
personally evaluated and examined the patient. I personally made or approved the  
documented management plan and acknowledge its risk of complications.    
    
Medical Records    
Attestation: I reviewed the patient's medical records.    
Imaging Data    
XR tib/fib:     
      Attestation: I have reviewed the pertinent imaging results.    
      Radiologist's impression:     
    
ITS Impressions    
    
Tibia/Fibula X-Ray  12/03/24 18:25    
IMPRESSION:     
Mild soft tissue swelling in the left leg and ankle. Medial left leg     
laceration. Atherosclerotic calcifications.    
     
Left tibia and fibula radiographs are otherwise unremarkable.    
     
     
Electronically authenticated by: JOSE ALFREDO HASTINGS   Date: 12/03/2024  19:05    
    
    
    
    
    
Discharge Plan    
Discharge    
Chief Complaint: Extremity Injury, Lower    
    
Clinical Impression:    
 Laceration of left leg    
    
    
Patient Disposition: Home, Self-Care    
    
Time of Disposition Decision: 20:25    
    
Condition: Good    
    
Prescriptions / Home Meds:    
New    
  hydrocodone-acetaminophen 5-325 mg tablet     
   1 tab PO Q6H PRN (Reason: pain) 3 Days Qty: 12 0RF    
   Rx Instructions:    
   DX : M79.605    
  cephalexin 500 mg capsule     
   500 mg PO Q8H 10 Days Qty: 30 0RF    
  ondansetron 4 mg tablet,disintegrating     
   4 mg PO Q6H PRN (Reason: nausea and vomiting) Qty: 12 0RF    
    
No Action    
  NO HOME MEDICATIONS       
           
    
Print Language: English    
    
Instructions:  Laceration (ED)    
    
Additional Instructions:    
Sutures removed in 10-12 days with PCP    
    
Referrals:    
Physician,Non-Staff, MD [Primary Care Provider] - 1 week

## 2024-12-23 ENCOUNTER — HOSPITAL ENCOUNTER
Dept: HOSPITAL 101 - WC | Age: 74
Discharge: HOME | End: 2024-12-23
Payer: MEDICARE

## 2024-12-23 DIAGNOSIS — L60.3: ICD-10-CM

## 2024-12-23 DIAGNOSIS — S81.802A: Primary | ICD-10-CM

## 2024-12-23 PROCEDURE — G0463 HOSPITAL OUTPT CLINIC VISIT: HCPCS

## 2024-12-23 NOTE — XMS_ITS
Comprehensive CCD (C-CDA v2.1)  
  
                          Created on: 2024  
  
  
Payton Bravo  
External Reference #: CDR,PersonID:[id number]  
: 1950  
Sex: Female  
  
Demographics  
  
  
                                        Address             [address] [place]  [address]  
   
                                        Home Phone          [phone]  
   
                                        Mobile Phone        [phone]  
   
                                        Preferred Language  en  
   
                                        Marital Status        
   
                                        Sikh Affiliation Unknown  
   
                                        Race                White  
   
                                        Ethnic Group        Not  or Lati  
no  
  
  
Author  
  
  
                                        Organization        [facility]  
  
  
Care Team Providers  
  
  
                                Care Team Member Name Role            Phone  
   
                                Cee Hand Primary Care Provider [phone]  
   
                                MARQUISE TIRADO Admitting       Unavailable  
   
                                MARQUISE TIRADO Attending       Unavailable  
   
                                CEE HAND Primary Care    Unavailable  
   
                                Dinesh SONI Attending       Unavailable  
   
                                CEE HAND Referring       Unavailable  
   
                                JAKOB HAND Primary Care    Unavailable  
   
                                JAKOB HAND Admitting       Unavailable  
   
                                JAKOB HAND Attending       Unavailable  
   
                                COLLINS, JAKOB HOGAN Consulting      Unavailable  
   
                                REKHA MILLER    Consulting      Unavailable  
   
                                AICHHOLOSMAR, JAKOB CEE Primary Care    Unavailable  
   
                                SHELLI, DR BRODERICK GARCIA Consulting      Unavailable  
   
                                PAIGE WALSH Attending       Unavailable  
   
                                PAIGE WALSH Admitting       Unavailable  
   
                                PAIGE WALSH Consulting      Unavailable  
   
                                AICHHOLOSMAR, JAKOB CEE Primary Care    Unavailable  
   
                                SHELLI, DR BRODERICK GARCIA Consulting      Unavailable  
   
                                PAIGE WALSH Attending       Unavailable  
   
                                PAIGE WALSH Admitting       Unavailable  
   
                                PAIGE WALSH Consulting      Unavailable  
   
                                REKHA LYNNE Admitting       Unavailable  
   
                                COLLINS, JAKOB CEE Primary Care    Unavailable  
   
                                SHELLI, DR BRODERICK GARCIA Consulting      Unavailable  
   
                                REKHA LYNNE Attending       Unavailable  
   
                                REKHA LYNNE Consulting      Unavailable  
   
                                Cee Hand Unavailable     [phone]  
   
                                Unavailable     Unavailable     [phone]  
   
                                ANA OREILLY   Attending       Unavailable  
   
                                ANA OREILLY   Referring       Unavailable  
   
                                Mrs. Cee Hand Primary Care    Unavailab  
le  
   
                                CARLIN FASID   Admitting       Unavailable  
   
                                Joss Juares    Unavailable     [phone]  
   
                                Steffanyhrajat APREVERETT-Cee ROBERT Primary Care Provider  
 [phone]  
   
                                ALEKS BRAR Attending       Unavailable  
   
                                CEE HAND Primary Care    Unavailable  
   
                                ANA OREILLY   Attending       Unavailable  
   
                                ANA OREILLY   Referring       Unavailable  
   
                                CEE HAND Primary Care    Unavailable  
   
                                Cee Hand Primary Care Provider [phone]  
   
                                MD Joss Juares Attending Provider [phone]  
   
                                Joss Juares  Attending       Unavailable  
   
                                Joss Juares  Admitting       Unavailable  
   
                                Cee Hand Primary Care    Unavailable  
   
                                CEE HAND Referring       Unavailable  
   
                                GODWIN MANDEL   Primary Care    Unavailable  
   
                                AICDARVIN DENNISA ADRIEL Referring       Unavailable  
   
                                NADERER, GODWIN   Primary Care    Unavailable  
   
                                Aichholz NP, Cee Unavailable     [phone]  
   
                                Godwin Mandel MD Primary Care Provider [phone]  
   
                                CEE HAND  Attending       Unavailable  
   
                                CEE HAND  Attending       Unavailable  
  
  
  
Allergies  
  
  
                                                    Allergy   
Classification                          Reported   
Allergen(s)               Allergy Type              Date of   
Onset                     Reaction(s)               Facility  
   
                                                      
(1 source)                Adhesive Tape             Propensity to   
adverse   
reactions to   
drug                                      
1                         [facility]  
   
                                                      
(3 sources)         Nitrofurantoin      Drug Allergy          
1                                                   [facility]  
   
                                                      
(11 sources)                            Penicillins;   
Translations:   
[Penicillins]                           Propensity to   
adverse   
reactions to   
drug                                      
9                         [facility]  
Work Phone:   
[phone]  
1  
   
                                                      
(1 source)                Seasonal allergy          Propensity to   
adverse   
reactions to   
substance                                 
1                                                   [facility]  
Work Phone:   
[phone]  
1  
   
                                                      
(1 source)                bee venom                 Drug allergy   
(disorder)                                
9                                                   [facility]   
Repository  
   
                                                      
(4 sources)                             Adhesive Bandages;   
Translations:   
[Adhesive   
Bandages]                               Allergy to drug   
(finding)                                                   [facility]   
Gastroenterol  
[facility]  
Work Phone:   
[phone]  
   
                                                      
(1 source)                              ALLERGIES NOT ON   
FILE;   
Translations:   
[ALLERGIES NOT ON   
FILE]                                   Propensity to   
adverse   
reactions   
(disorder)                                                  [facility]   
3 Repository  
   
                                                      
(4 sources)                             Adhesive agent;   
Translations:   
[ADHESIVE]                              Drug   
Intolerance                               
1                         [facility]  
   
                                                      
(3 sources)                             Latex;   
Translations:   
[LATEX]                                 Allergy to   
substance                                 
3                         [facility]  
Work Phone:   
[phone]  
   
                                                      
(3 sources)                             Penicillin;   
Translations:   
[PENICILLIN]              Drug Allergy                
3                         [facility]  
Work Phone:   
[phone]  
   
                                                      
(1 source)                Adhesive agent            Drug allergy   
(disorder)                                
3                                                   [facility]   
Repository  
   
                                                      
(2 sources)               Latex                     Allergy to   
substance                                 
3                         Unknown                   [facility]   
Healthcare  
   
                                                      
(2 sources)               Other                     Propensity to   
adverse   
reactions                                 
1                                                   [facility]   
Healthcare  
   
                                                      
(2 sources)                             Penicillin G   
Benzathine & Proc         Drug Allergy                
3                         Hives                     [facility]   
Healthcare  
   
                                                      
(2 sources)                             Wound Dressing   
Adhesive                  Drug Allergy                
3                         Unknown                   [facility]   
Healthcare  
  
  
  
Medications  
Current Medications  
  
  
  
                      Medication Drug Class(es) Dates      Sig (Normalized) Sig   
(Original)  
   
                                                    acetaminophen 500 mg   
oral tablet  
(1 source)                                          Start:   
2021                                          acetaminophen   
(TYLENOL) tablet   
1,000 mg  
   
                                                    acetaminophen 325 mg   
/ HYDROcodone   
bitartrate 5 mg oral   
tablet  
(5 sources)               Opioid Agonist            Start:   
2024  
End:   
2024                              take 1 tablet by   
mouth every six   
hours as needed for   
pain                                    HYDROcodone-acetami  
nophen (Norco)   
5-325 MG tablet   
TAKE 1 TABLET BY   
MOUTH EVERY 6 HOURS   
AS NEEDED FOR PAIN   
FOR 3 DAYS   
2024   
Discontinued  
  
  
  
                                Start: 2021                 HYDROcodone-ac  
etaminophen (NORCO) 5-325 MG per tablet 1   
tablet  
   
                                                    Start: 2021  
End: 2021                                     HYDROcodone-acetaminophen (N  
ORCO) 5-325 MG per tablet   
Indications: Post-op pain Take 1 tablet by mouth every 6 hours as   
needed for Pain for up to 3 days. Intended supply: 7 days. Take   
lowest dose possible to manage pain 12 tablet 0 2021 Discontinued (Stop Taking at Discharge)  
  
  
  
                                                    benzocaine 15 mg /   
menthol 3.6 mg oral   
lozenge  
(1 source)                              Standardized   
Chemical Allergen                       Start:   
2021                                          benzocaine-menthol   
(CEPACOL SORE THROAT)   
lozenge 1 lozenge  
   
                                                    cephalexin 500 mg   
oral capsule  
(2 sources)                             Cephalosporin   
Antibacterial                           Start:   
2024                              take 1   
capsule by   
mouth in   
the   
morning,   
then take 1   
capsule by   
mouth in   
the   
evening,   
then take 1   
capsule by   
mouth at   
bedtime                                 cephalexin (Keflex)   
500 MG capsule Take   
500 mg by mouth in the   
morning and 500 mg in   
the evening and 500 mg   
before bedtime.   
2024 Active  
   
                                                    12 hr cetirizine   
hydrochloride 5 mg /   
pseudoephedrine   
hydrochloride 120 mg   
extended release   
oral tablet  
(2 sources)                             alpha-Adrenergic   
Agonist,   
Histamine-1   
Receptor   
Antagonist                              Start:   
2024                              take 1   
tablet by   
mouth once   
in the   
morning,   
then take 1   
tablet by   
mouth every   
twelve   
hours at   
bedtime                                 cetirizine-pseudoephed  
rine (ZyrTEC-D) 5-120   
MG 12 hr tablet   
Indications:   
Environmental and   
seasonal allergies   
Take 1 tablet by mouth   
in the morning and 1   
tablet before bedtime.   
60 tablet 1 2024   
Active  
   
                                                    ciprofloxacin 250 mg   
oral tablet  
(2 sources)                             Quinolone   
Antimicrobial                           Start:   
2021  
End:   
2021                                          ciprofloxacin (CIPRO)   
250 MG tablet Take 1   
tablet by mouth 2   
times daily for 7 days   
Take for full 7 days   
if discharged home   
with ruff. Take for 5   
days if discharged   
home without ruff 14   
tablet 0 2021 Active  
   
                                                    docusate sodium 50   
mg / sennosides, usp   
8.6 mg oral tablet  
(3 sources)                                         Start:   
2021                              take 1   
tablet by   
mouth once   
daily                                   1 tablet, Oral,   
NIGHTLY, First dose on   
21 at 2100,   
Post-op  
  
  
  
                                                    Start: 2021  
End: 2021           take 8.6-50 mg by mouth once senna-docusate (SENOKOT S  
) 8.6-50   
MG   
per tablet Take 1 tablet by mouth   
daily 60 tablet 0 2021 Active  
  
  
  
                                                    0.4 ml   
enoxaparin   
sodium 100 mg/ml   
prefilled   
syringe  
(1 source)                              Low Molecular   
Weight Heparin                          Start:   
2021                              inject 40 mg by   
subcutaneous   
injection once   
daily                                   40 mg, SubCUTAneous,   
DAILY, First dose on   
21 at 0900   
Pharmacy to dose if   
renal insufficiency   
present.&nbsp;POD#1 if   
Hgb >8.0. Do not   
administer if Hgb <   
8.0 Post-op  
   
                                                    hyoscyamine   
sulfate 0.125 mg   
oral tablet  
(2 sources)                                         Start:   
2023                              take 1 tablet   
by mouth twice   
daily                                   hyoscyamine (Anaspaz,   
Levsin) 0.125 mg   
tablet Indications:   
Encounter for   
screening for   
malignant neoplasm of   
colon , Polyp of colon   
Take 1 tablet (0.125   
mg) by mouth 2 times a   
day. 60 tablet 1   
2023 Active  
   
                                                    ibuprofen 600 mg   
oral tablet  
(2 sources)                             Nonsteroidal   
Anti-inflammatory   
Drug                                    Start:   
2021  
End:   
2021                              take 1 tablet   
by mouth every   
six hours as   
needed for pain                         ibuprofen   
(ADVIL;MOTRIN) 600 MG   
tablet Take 1 tablet   
by mouth every 6 hours   
as needed for Pain 30   
tablet 0 2021   
Active  
   
                                                    1 ml ketorolac   
tromethamine 30   
mg/ml cartridge  
(1 source)                              Nonsteroidal   
Anti-inflammatory   
Drug,   
Cyclooxygenase   
Inhibitor                               Start:   
2021  
End:   
2021                                          ketorolac (TORADOL)   
injection 15 mg  
   
                                                    loratadine 10 mg   
oral capsule  
(3 sources)                                                     loratadine 10 mg  
   
capsule Take by mouth.   
0 Active  
  
  
  
                                                                Loratadine (CLAR  
ITIN PO) Take by mouth daily 0 Suspended  
  
  
  
                                                    magnesium hydroxide   
80 mg/ml oral   
suspension  
(1 source)                                          Start:   
2021                              take 30 mL by   
mouth once daily   
as needed for   
constipation                            30 mL, Oral, DAILY   
PRN, Constipation,   
Starting on 21 at 1635   
First line therapy   
for constipation.   
Post-op  
   
                                                    1 ml morphine   
sulfate 2 mg/ml   
cartridge  
(1 source)                Opioid Agonist            Start:   
2021                              take 2 mg by   
mouth every four   
hours as needed   
for pain                                2 mg, IntraVENous,   
EVERY 4 HOURS PRN,   
Pain Severe (7-10),   
Starting on 21 at 1635 If   
oral and IV   
narcotics ordered,   
use oral first and   
only use IV if oral   
is ineffective or   
cannot take   
oral.&amp;nbsp;&nbs  
p;Do Not give oral   
and IV within 1   
hour of each other   
unless specifically   
ordered. Post-op  
   
                                                    ondansetron 4 mg   
disintegrating oral   
tablet  
(6 sources)                             Serotonin-3   
Receptor   
Antagonist                              Start:   
2024  
End: 2024                         take 1 tablet by   
mouth every eight   
hours as needed   
for nausea and   
vomiting                                ondansetron ODT   
(Zofran-ODT) 4 MG   
disintegrating   
tablet Take 4 mg by   
mouth every 8   
(eight) hours if   
needed for nausea   
or vomiting   
2024   
Discontinued   
(Therapy completed)  
  
  
  
                                                    Start: 2021  
End: 2021                                     ondansetron (ZOFRAN) 4 MG/2M  
L   
injection  
   
                                                    Start: 2021  
End: 2021                         take 1 tablet by mouth every   
eight hours as needed for   
nausea                                  ondansetron (ZOFRAN ODT) 4 MG   
disintegrating tablet Take 1 tablet   
by mouth every 8 hours as needed for   
Nausea or Vomiting 15 tablet 0   
2021 Active  
   
                                                    Start: 2021  
End: 2021                                     ondansetron (ZOFRAN) injecti  
on 4 mg  
  
  
  
                                                    ondansetron (ZOFRAN-ODT)   
disintegrating tablet 4   
mg  
(1 source)                      Start: 2021                 ondansetron (Z  
OFRAN-ODT)   
disintegrating tablet 4   
mg  
   
                                                    polyethylene glycol 3350   
532434 mg / potassium   
chloride 2970 mg /   
sodium bicarbonate 6740   
mg / sodium chloride   
5860 mg / sodium sulfate   
19621 mg powder for oral   
solution  
(2 sources)     Osmotic Laxative Start: 2023                 PEG-3350/Elec  
trolytes   
236 GM as directed   
Orally once daily for 1   
days  Active  
   
                                                    72 hr scopolamine 0.0139   
mg/hr transdermal system  
(2 sources)               Anticholinergic           Start: 2021  
End: 2021                                     scopolamine   
(TRANSDERM-SCOP) 1   
MG/3DAYS transdermal   
patch  
  
  
  
                                Start: 2021                 scopolamine (T  
RANSDERM-SCOP) transdermal patch 1 patch  
  
  
  
                                                    5 ml sodium   
chloride 9 mg/ml   
injection  
(4 sources)                             Start: 2021   take 1 dose   
intravenously twice   
daily                                   5-40 mL, IntraVENous, EVERY 12   
HOURS SCHEDULED (2 times per   
day), First dose on 21 at 2100 For Line   
Patency: Peripheral IV = 5 mL;   
Midline or Central Line = 10   
mL/lumen.&nbsp;&nbsp;If   
following IV push medication,   
administer flush at same rate   
as the IV push. Flush volume   
is determined by type of   
infusion therapy being   
given.&nbsp;&nbsp;For   
non-viscous solutions   
use:&nbsp;Peripheral IV = 5   
mL&nbsp;Midline or Central   
Line = 10   
mL/lumen&nbsp;&nbsp;For   
viscous solutions (i.e. blood   
components, parenteral   
nutrition, contrast media, or   
after obtaining blood sample)   
use:&nbsp;Peripheral IV = 10   
mL&nbsp;Midline or Central   
Line = 20 mL/lumen Post-op  
  
  
  
                                Start: 2021                 IntraVENous, a  
t 100 mL/hr,   
CONTINUOUS, Starting on 21 at 1700, Post-op  
   
                                        Start: 2021   take 25 mL intraveno  
usly every   
hour as needed                          25 mL, IntraVENous, at 100 mL/hr,   
PRN, If patient receiving   
piggyback infusions without   
ordered maintenance IV fluids or   
with frequent/long duration   
piggyback infusions, Starting on   
21 at 1635 Administer   
at the same rate as the piggyback   
being infused. Post-op  
   
                                Start: 2021 take 5-40 mL intravenously onc  
e 5-40 mL, IntraVENous, PRN,   
Line   
Care, Starting on 21 at   
1635 After every IV line use   
Post-op  
  
  
  
                                                    tamsulosin   
hydrochloride 0.4   
mg oral capsule  
(2 sources)                             alpha-Adrenergic   
Blocker                                 Start:   
2021  
End: 2021                         take 1   
capsule by   
mouth once   
daily                                   tamsulosin   
(FLOMAX) 0.4 MG   
capsule Take 1   
capsule by mouth   
daily for 7 days   
Take only if   
discharged home   
without ruff   
and having   
difficulty with   
urination 7   
capsule 0   
2021   
Active  
   
                                                    therapeutic   
multivitamin-minera  
ls (Theragran-M)   
tablet  
(2 sources)                                                 take 1   
tablet by   
mouth once   
daily                                   therapeutic   
multivitamin-min  
erals   
(Theragran-M)   
tablet Take 1   
tablet by mouth   
Daily Active  
  
  
  
Completed/Discontinued Medications  
  
  
  
                      Medication Drug Class(es) Dates      Sig (Normalized) Sig   
(Original)  
   
                                                    acetaminophen 325 mg   
/ oxyCODONE   
hydrochloride 5 mg   
oral tablet  
(1 source)                Opioid Agonist            Start:   
2021  
End:   
2021                                          oxyCODONE-acetami  
nophen (PERCOCET)   
5-325 MG per   
tablet  
   
                                                    aprepitant 40 mg oral   
capsule  
(2 sources)                             Substance   
P/Neurokinin-1   
Receptor   
Antagonist                              Start:   
2021  
End:   
2021                                          aprepitant   
(EMEND) capsule   
40 mg  
  
  
  
                                                    Start: 2021  
End: 2021                                     aprepitant (EMEND) 40 MG cap  
armando  
  
  
  
                                                    ascorbic acid 100 mg   
oral tablet  
(1 source)          Vitamin C                               take 1 tablet   
by mouth once   
daily                                   Ascorbic Acid   
(VITAMIN C) 100 MG   
tablet Take 100 mg   
by mouth daily 0   
Suspended  
   
                                                    Bioflavonoid Products   
(BIOFLEX PO)  
(1 source)                                                      [employer]   
Products (BIOFLEX   
PO) Take by mouth   
daily With vitamin D   
0 Suspended  
   
                                                    calcium chloride   
0.0014 meq/ml /   
potassium chloride   
0.004 meq/ml / sodium   
chloride 0.103 meq/ml   
/ sodium lactate   
0.028 meq/ml   
injectable solution  
(1 source)                                          Start:   
20  
End:   
20                                                  lactated ringers   
infusion  
   
                                                    50 ml clindamycin 6   
mg/ml injection  
(1 source)                              Lincosamide   
Antibacterial                           Start:   
20  
End:   
20                                                  300 mg, IntraVENous,   
EVERY 8 HOURS, 2   
doses, First dose on   
21 at   
2030, Last dose on   
21 at 0430  
   
                                                    2 ml famotidine 10   
mg/ml injection  
(2 sources)                             Histamine-2 Receptor   
Antagonist                              Start:   
20  
End:   
20                                                  famotidine (PEPCID)   
injection 20 mg  
  
  
  
                                                    Start: 2021  
End: 2021                                     famotidine (PEPCID) 20 MG/2M  
L injection  
  
  
  
                                                    2 ml fentaNYL 0.05 mg/ml   
injection  
(2 sources)               Opioid Agonist            Start: 2021  
End: 2021                                     fentaNYL (SUBLIMAZE)   
injection 100 mcg  
  
  
  
                                                    Start: 2021  
End: 2021                                     fentaNYL (SUBLIMAZE) 100 MCG  
/2ML injection  
  
  
  
                                                    1 ml HYDROmorphone   
hydrochloride 1 mg/ml   
cartridge  
(3 sources)               Opioid Agonist            Start: 2021  
End: 2021                                     HYDROmorphone (DILAUDID)   
1 MG/ML injection  
  
  
  
                                                    Start: 2021  
End: 2021                                     HYDROmorphone (DILAUDID) inj  
ection 0.5 mg  
  
  
  
                                                    2 ml midazolam 1 mg/ml   
injection  
(2 sources)               Benzodiazepine            Start: 2021  
End: 2021                                     midazolam PF (VERSED)   
injection 2 mg  
  
  
  
                                                    Start: 2021  
End: 2021                                     midazolam (VERSED) 2 MG/2ML   
injection  
  
  
  
                                                    2 ml naloxone   
hydrochloride 1 mg/ml   
prefilled syringe  
(1 source)                Opioid Antagonist         Start: 2021  
End: 2021                                     naloxone (NARCAN)   
injection 1 mg  
  
  
  
                                                    Start: 2021  
End: 2021                                     naloxone (NARCAN) injection   
1 mg  
  
  
  
                                                    phenazopyridine hydrochlorid  
e   
100 mg oral tablet  
(2 sources)                                         Start: 2021  
End: 2021                                     phenazopyridine (PYRIDIUM) 1  
00   
MG tablet  
  
  
  
                                                    Start: 2021  
End: 2021                                     phenazopyridine (PYRIDIUM) t  
ablet 200 mg  
  
  
  
                                                    polyethylene glycol   
3350 636273 mg /   
potassium chloride   
1480 mg / sodium   
bicarbonate 5720 mg   
/ sodium chloride   
69222 mg powder for   
oral solution  
(4 sources)                             Osmotic   
Laxative            Start: 2023                       PEG 3350-KCl-Na   
Bicarb-NaCl 420 GM   
Oral Solution   
Reconstituted TAKE   
AS DIRECTED.   
Quantity: 1 Refills:   
0 Ordered:   
19-Sep-2023 Ana Oreilly MD Start :   
2023 Active  
   
                                                    simethicone 80 mg   
chewable tablet  
(1 source)                                          Start: 2021  
End: 2021                         take 1 tablet   
by mouth four   
times daily   
as needed                               simethicone   
(MYLICON) 80 MG   
chewable tablet Take   
1 tablet by mouth 4   
times daily as   
needed for   
Flatulence 30 tablet   
0 2021   
Discontinued (Stop   
Taking at Discharge)  
  
  
  
Problems  
Active Problems  
  
  
                      Problem Classification Problem    Date       Documented Da  
te Episodic/Chronic  
   
                                                    Allergic reactions  
(4 sources)                             Dermatitis,   
unspecified;   
Translations:   
[Allergy status to   
penicillin]                             Onset:   
2023                Episodic  
   
                                                    Asthma  
(2 sources)                             Unspecified asthma,   
uncomplicated;   
Translations:   
[Uncomplicated   
asthma]                                 Onset:   
2023                Chronic  
   
                                                    Fracture of lower limb  
(5 sources)                             Nondisplaced   
fracture of fifth   
metatarsal bone,   
left foot, initial   
encounter for closed   
fracture;   
Translations:   
[Displaced fracture   
of fifth metatarsal   
bone, left foot,   
initial encounter   
for closed fracture]                    Onset:   
2023                                          Episodic  
   
                                                    Open wounds of   
extremities  
(4 sources)                             Open wound of left   
lower leg;   
Translations:   
[Unspecified open   
wound, left lower   
leg, sequela]                           Onset:   
2024                Episodic  
   
                                                    Osteoarthritis  
(6 sources)                             Localized, primary   
osteoarthritis of   
the pelvic region   
and thigh;   
Translations:   
[Unilateral primary   
osteoarthritis,   
right hip]                              Onset:   
2024                                          Chronic  
   
                                                    Other and unspecified   
benign neoplasm  
(8 sources)                             Polyp of colon;   
Translations:   
[Benign neoplasm of   
colon]                                  2023          Episodic  
   
                                                    Other and unspecified   
benign neoplasm  
(4 sources)                             Polyp of colon;   
Translations: [Polyp   
of colon]                               Onset:   
2023                                          Episodic  
   
                                                    Other and unspecified   
benign neoplasm  
(1 source)                              Benign neoplasm of   
transverse colon;   
Translations:   
[Benign neoplasm of   
transverse colon]                       Onset:   
2023                                          Episodic  
   
                                                    Other and unspecified   
benign neoplasm  
(1 source)                              Benign neoplasm of   
colon, unspecified;   
Translations:   
[Benign neoplasm of   
colon, unspecified]                     Onset:   
2023                                          Episodic  
   
                                                    Other and unspecified   
benign neoplasm  
(1 source)                              Benign neoplasm of   
transverse colon;   
Translations:   
[Benign neoplasm of   
transverse colon]                       2023          Episodic  
   
                                                    Other and unspecified   
benign neoplasm  
(1 source)                              Benign neoplasm of   
colon; Translations:   
[Benign neoplasm of   
colon, unspecified]                     2023          Episodic  
   
                                                    Other circulatory   
disease  
(1 source)                              Raynaud's syndrome   
without gangrene;   
Translations:   
[Raynaud's syndrome   
without gangrene]                       Onset:   
07-                                          Chronic  
   
                                                    Other circulatory   
disease  
(2 sources)                             Raynaud's   
phenomenon;   
Translations:   
[Raynaud's syndrome   
without gangrene]                       Onset:   
2024                Chronic  
   
                                                    Other gastrointestinal   
disorders  
(2 sources)                             Irritable bowel   
syndrome with   
diarrhea;   
Translations:   
[Irritable bowel   
syndrome with   
diarrhea]                               Onset:   
2024                Chronic  
   
                                                    Other nervous system   
disorders  
(5 sources)                             Chronic pain;   
Translations: [Other   
chronic pain]                           Onset:   
2024                Chronic  
   
                                                    Other nervous system   
disorders  
(2 sources)                             Other chronic pain;   
Translations: [Other   
chronic pain]                                               Chronic  
   
                                                    Other nervous system   
disorders  
(1 source)                              Postoperative pain ;   
Translations: [Other   
acute postprocedural   
pain]                                                       Episodic  
   
                                                    Other non-epithelial   
cancer of skin  
(2 sources)                             Personal history of   
other malignant   
neoplasm of skin;   
Translations:   
[History of   
malignant neoplasm   
of skin]                                Onset:   
2023                Episodic  
   
                                                    Other upper   
respiratory disease  
(2 sources)                             Allergic   
disposition;   
Translations: [Other   
allergic rhinitis]                      Onset:   
2024                Chronic  
   
                                                    Regional enteritis and   
ulcerative colitis  
(3 sources)                             Ulcerative colitis,   
unspecified, without   
complications;   
Translations:   
[Ulcerative colitis]                    Onset:   
2024                Chronic  
   
                                                    Screening and history   
of mental health and   
substance abuse codes  
(2 sources)                             Personal history of   
nicotine dependence;   
Translations:   
[Personal history of   
tobacco use]                            Onset:   
2023                Episodic  
   
                                                    Spondylosis;   
intervertebral disc   
disorders; other back   
problems  
(16 sources)                            Solitary   
sacroiliitis;   
Translations:   
[Sacroiliitis, not   
elsewhere   
classified]                             Onset:   
2024                                          Chronic  
  
  
Past or Other Problems  
  
  
                      Problem Classification Problem    Date       Documented Da  
te Episodic/Chronic  
   
                                                    Mood disorders  
(2 sources)               Mood disorders            Onset:   
2024                  
   
                                                    Other non-traumatic   
joint disorders  
(2 sources)                             Hip pain;   
Translations:   
[Pain in right   
hip]                                    Onset:   
10-                10-                Episodic  
   
                                                    Other screening for   
suspected conditions   
(not mental disorders   
or infectious disease)  
(7 sources)                             Patient encounter   
status;   
Translations:   
[Encounter for   
screening for   
malignant   
neoplasm of   
colon]                                  Onset:   
2023                Episodic  
   
                                                    Spondylosis;   
intervertebral disc   
disorders; other back   
problems  
(2 sources)                             Low back pain;   
Translations:   
[Low back pain]                         Onset:   
10-                10-                Episodic  
   
                                                    Superficial injury;   
contusion  
(4 sources)                             Contusion of left   
foot, initial   
encounter;   
Translations:   
[CONTUSION LEFT   
FOOT INITIAL ENC]                       Onset:   
01-                                          Episodic  
   
                                                    Unclassified  
(2 sources)                             Open wound of   
left lower leg                          2024            
  
  
  
Results  
  
  
                          Test Name    Value        Interpretation Reference   
Range                                   Facility  
   
                                                    MAMM SCREENING BILATERAL W C  
ADon 2024   
   
                                                    MAMM SCREENING   
BILATERAL W CAD                         MAMM SCREENING   
BILATERAL W CAD  
EXAM: MAMM SCREENING   
BILATERAL W CAD,   
7/10/2024 3:01 PM  
CLINICAL INDICATIONS:   
Screening, Encounter   
for screening mammogram   
for malignant neoplasm   
of breast  
COMPARISON: Baseline  
TECHNIQUE:  
Bilateral digital   
tomosynthesis MLO and   
CC views of the breasts   
were obtained, with   
creation of synthetic   
2D views. Computer   
aided detection was   
utilized.  
FINDINGS:  
There are scattered   
areas of fibroglandular   
density.  
There are no suspicious   
masses, calcifications,   
or areas of   
architectural   
distortion.  
IMPRESSION:  
No mammographic   
evidence of malignancy.  
BI-RADS: BI-RADS 1 -   
Negative  
Recommendation: Routine   
screening mammogram in   
1 year.  
Workstation:[id number]  
Finalized by Isauro Delgado MD on   
2024 9:08 AM  
1  
b  
MAMM 1 YR           Normal                                  [facility]  
   
                                                    CBC AND AUTO DIFFon 07-10-20  
24   
   
                      ABSOLUTE BASOPHIL 0.1 X10E9/L Normal     0.0-0.2    [facility]  
   
                                        Comment on above:   Performed By: #### C  
GILBERT, 83666-0, CMP, 15702-9 ####  
[facility] ([id number])  
[address], [address]  
[place] [address]   
   
                      ABSOLUTE NEUTROPHIL 5.3 X10E9/L Normal     1.5-6.6    [facility]  
   
                                        Comment on above:   Performed By: #### CB HUNT, 48194-1, CMP, 96812-0 ####  
[facility] ([id number])  
[address], [address]  
[place] [address]   
   
                                                    Basophils/100 WBC   
(Bld)           1.0 %           Normal                          [facility]  
   
                                        Comment on above:   Performed By: #### CB HUNT, 35861-6, CMP, 27699-6 ####  
[facility] ([id number])  
[address], [address]  
[place] [address]   
   
                                                    Eosinophils (Bld)   
[#/Vol]         0.1 10*3/uL     Normal          0.0-0.4         [facility]  
   
                                        Comment on above:   Performed By: #### CB HUNT, 81398-4, CMP, 41100-3 ####  
[facility] ([id number])  
[address], [address]  
[place] [address]   
   
                                                    Eosinophils/100 WBC   
(Bld)           0.7 %           Normal                          [facility]  
   
                                        Comment on above:   Performed By: #### CB HUNT, 74694-9, CMP, 56366-6 ####  
[facility] ([id number])  
[address], [address]  
[place] [address]   
   
                                                    Erythrocyte   
distribution width   
(RBC) [Ratio]   14.9 %          Normal          11.5-15.0       [facility]  
   
                                        Comment on above:   Performed By: #### CB HUNT, 43653-9, CMP, 96612-8 ####  
[facility] ([id number])  
[address]  
[place] [address]   
   
                                                    Hematocrit (Bld)   
[Volume fraction] 42.3 %          Normal          35-47           [facility]  
   
                                        Comment on above:   Performed By: #### CB HUNT, 11129-6, CMP, 40370-7 ####  
[facility] ([id number])  
[address], [facility] [address]  
[place] [address]   
   
                                                    Hemoglobin (Bld)   
[Mass/Vol]      14.2 g/dL       Normal          11.7-15.5       [facility]  
   
                                        Comment on above:   Performed By: #### CB HUNT, 26739-7, CMP, 54730-5 ####  
[facility] ([id number])  
[address]  
[place] [address]   
   
                                                    Lymphocytes (Bld)   
[#/Vol]         1.6 10*3/uL     Normal          1.0-3.5         [facility]  
   
                                        Comment on above:   Performed By: #### CB HUNT, 48726-2, CMP, 63181-7 ####  
[facility] ([id number])  
[address]  
[place] [address]   
   
                                                    Lymphocytes/100 WBC   
(Bld)           20.9 %          Normal                          [facility]  
   
                                        Comment on above:   Performed By: #### CB HUNT, 83601-5, CMP, 33892-2 ####  
[facility] ([id number])  
[address]  
[place] [address]   
   
                                                    MCH (RBC) [Entitic   
mass]           30.2 pg         Normal          27-34           [facility]  
   
                                        Comment on above:   Performed By: #### CB  
BCA, 12714-1, CMP, 02694-9 ####  
[facility] ([id number])  
[address], [address]  
[place] [address]   
   
                      MCHC (RBC) [Mass/Vol] 33.7 g/dL  Normal     32-36      [facility]  
[facility]   
[facility]  
   
                                        Comment on above:   Performed By: #### CB HUNT, 21594-9, CMP, 02647-0 ####  
[facility] ([id number])  
[address], [address]  
[place] [address]   
   
                                                    MCV (RBC) [Entitic   
vol]            90 fL           Normal                    [facility]  
   
                                        Comment on above:   Performed By: #### CB HUNT, 04951-4, CMP, 41556-6 ####  
[facility] ([id number])  
[address], [address]  
[place] [address]   
   
                                                    Monocytes (Bld)   
[#/Vol]         0.4 10*3/uL     Normal          0-0.9           [facility]  
   
                                        Comment on above:   Performed By: #### CB HUNT, 65628-2, CMP, 49490-6 ####  
[facility] ([id number])  
[address], [address]  
[place] [address]   
   
                                                    Monocytes/100 WBC   
(Bld)           5.8 %           Normal                          [facility]  
   
                                        Comment on above:   Performed By: #### CB HUNT, 10771-9, CMP, 38525-0 ####  
[facility] ([id number])  
[address], [facility] [address]  
[place] [address]   
   
                                                    Neutrophils/100 WBC   
(Bld)           71.6 %          Normal                          [facility]  
   
                                        Comment on above:   Performed By: #### CB HUNT, 92197-1, CMP, 15002-7 ####  
[facility] ([id number])  
[address], [address]  
[place] [address]   
   
                                                    Platelet mean volume   
(Bld) [Entitic vol] 10.2 fL         Normal          7-12            [facility]  
   
                                        Comment on above:   Performed By: #### CB  
BCA, 00348-2, CMP, 16256-6 ####  
[facility] ([id number])  
[address], [address]  
[place] [address]   
   
                                                    Platelets (Bld)   
[#/Vol]         201 10*3/uL     Normal          150-450         [facility]  
   
                                        Comment on above:   Performed By: #### C  
BCA, 84780-8, CMP, 12333-4 ####  
[facility] ([id number])  
[address], [address]  
[place] [address]   
   
                      RBC COUNT  4.70 X10E12/L Normal     3.80-5.20  [facility]  
   
                                        Comment on above:   Performed By: #### C  
BCA, 65001-6, CMP, 58771-7 ####  
[facility] ([id number])  
[address], [facility] [address]  
[place] [address]   
   
                      WBC (Bld) [#/Vol] 7.4 10*3/uL Normal     4.0-11.0   [facility]  
   
                                        Comment on above:   Performed By: #### C  
BCA, 31037-2, CMP, 04721-7 ####  
[facility] ([id number])  
[address], [address]  
[place] [address]   
   
                                                    COMPREHENSIVE METABOLIC PANE  
Parth 07-   
   
                      Albumin [Mass/Vol] 4.4 g/dL   Normal     3.2-5.3    [facility]  
   
                                        Comment on above:   Performed By: #### C  
BCA, 63669-2, CMP, 02243-2 ####  
[facility] ([id number])  
[address], [address]  
[place] [address]   
   
                                                    ALP [Catalytic   
activity/Vol]   70 U/L          Normal                    [facility]  
   
                                        Comment on above:   Performed By: #### C  
BCA, 18799-5, CMP, 21256-1 ####  
[facility] ([id number])  
[address], [address]  
[place] [address]   
   
                                                    ALT [Catalytic   
activity/Vol]   12 U/L          Normal          0-31            [facility]  
   
                                        Comment on above:   Performed By: #### C  
BCA, 19774-0, CMP, 39930-6 ####  
[facility] ([id number])  
[address], [address]  
[place] [address]   
   
                      Anion gap [Moles/Vol] 11 mmol/L  Normal     5-15       [facility]  
   
                                        Comment on above:   Performed By: #### C  
BCA, 33848-5, CMP, 74971-2 ####  
[facility] ([id number])  
[address], [address]  
[place] [address]   
   
                                                    AST [Catalytic   
activity/Vol]   18 U/L          Normal          0-41            [facility]  
   
                                        Comment on above:   Performed By: #### C  
BCA, 72278-4, CMP, 77803-8 ####  
[facility] ([id number])  
[address], [address]  
[place] [address]   
   
                      Bilirubin [Mass/Vol] 0.7 mg/dL  Normal     0.3-1.2    [facility]  
   
                                        Comment on above:   Performed By: #### C  
BCA, 71287-8, CMP, 57889-7 ####  
[facility] ([id number])  
[address], [address]  
[place] [address]   
   
                      Calcium [Mass/Vol] 9.4 mg/dL  Normal     8.5-10.5   [facility]  
   
                                        Comment on above:   Performed By: #### C  
BCA, 41617-9, CMP, 12241-1 ####  
[facility] ([id number])  
[address], [address]  
[place] [address]   
   
                      Chloride [Moles/Vol] 101 mmol/L Normal          [facility]  
   
                                        Comment on above:   Performed By: #### C  
BCA, 47069-3, CMP, 70641-5 ####  
[facility] ([id number])  
[address], [address]  
[place] [address]   
   
                      CO2 [Moles/Vol] 26 mmol/L  Normal     22-32      [facility]  
   
                                        Comment on above:   Performed By: #### C  
BCA, 49970-1, CMP, 87130-3 ####  
[facility] ([id number])  
[address]  
[place] [address]   
   
                      Creatinine [Mass/Vol] 0.75 mg/dL Normal     0.40-1.00  [facility]  
   
                                        Comment on above:   Result Comment: METH  
OD TRACEABLE TO IDMS STANDARD   
   
                                                            Performed By: #### C  
BCA, 08263-0, CMP, 16192-7 ####  
[facility] ([id number])  
[address]  
[place] [address]   
   
                                                    GFR/1.73 sq   
M.predicted among   
non-blacks MDRD   
(S/P/Bld) [Vol   
rate/Area]      84 mL/min/{1.73_m2} Normal          >59             [facility]  
   
                                        Comment on above:   Result Comment:  
Reported eGFR is based on the  
CKD-EPI  equation that does  
not use a race coefficient.   
   
                                                            Performed By: #### C  
BCA, 03710-6, CMP, 36276-9 ####  
[facility] ([id number])  
 [address] [address]   
   
                      Glucose [Mass/Vol] 88 mg/dL   Normal     65-99      [facility]  
   
                                        Comment on above:   Performed By: #### C  
BCA, 88197-0, CMP, 68659-0 ####  
[facility] ([id number])  
[address] [address]   
   
                      Potassium [Moles/Vol] 3.7 mmol/L Normal     3.5-5.0    [facility]  
   
                                        Comment on above:   Performed By: #### C  
BCA, 35716-9, CMP, 84707-2 ####  
[facility] ([id number])  
[address]  
[place] [address]   
   
                      Protein [Mass/Vol] 7.1 g/dL   Normal     6.0-8.0    [facility]  
   
                                        Comment on above:   Performed By: #### C  
BCA, 50555-3, CMP, 74449-7 ####  
[facility] ([id number])  
[address] [place] [address]   
   
                      Sodium [Moles/Vol] 138 mmol/L Normal     134-146    [facility]  
   
                                        Comment on above:   Performed By: #### C  
GILBERT, 20027-8, CMP, 51002-5 ####  
[facility] ([id number])  
[address]  
[place] [address]   
   
                                                    Urea nitrogen   
[Mass/Vol]      15 mg/dL        Normal          5-27            [facility]  
   
                                        Comment on above:   Performed By: #### C  
GILBERT, 04550-1, CMP, 20299-7 ####  
[facility] ([id number])  
[address] [address]   
   
                                                    ESR Photometric method (Bld)  
 [Velocity]on 07-   
   
                                                    ESR, ERYTHROCYTE   
SEDIMENTATION RATE 13 mm/h         Normal          0-30            [facility]  
   
                                        Comment on above:   Performed By: #### CB HUNT, 33546-2, CMP, 73554-7 ####  
[facility] ([id number])  
[address] [address]   
   
                                                    Nuclear Ab IA Ql (S)on 07-10  
-2024   
   
                      HIREN Screen w/reflex Negative   Normal     NEG        [facility]  
   
                                        Comment on above:   Result Comment:  
Testing performed using multiplex flow  
immunoassay. Eleven different antigens  
associated with systemic autoimmune  
diseases (dsDNA,Sm,Sm/RNP,RNP,Chromatin,  
SSA,SSB,Justyna-1,Scl70,Ribo P,Centromere B)  
are included in this screening test.   
   
                                                            Performed By: #### CB HUNT, 42844-0, CMP, 10180-2 ####  
[facility] ([id number])  
[address] [address]   
   
                                                    XR lumbar spine AP/LAT/FLX/E  
XTon 2024   
   
                                                    XR lumbar spine   
AP/LAT/FLX/EXT                          [facility]  
[address] [place] [address]  
  
XRay Report  
Signed  
  
Patient:   
Payton Bravo MR#:   
  
[id number]  
: 1950   
Acct:[id number]  
  
Age/Sex: 73 / F ADM   
Date: 24  
  
Loc: XD Room: Type: [facility]  
Attending Dr: Joss Juares MD  
Copies to: Joss Juares MD  
  
  
  
Ordering Provider:   
Joss Juares MD  
Date of Service:   
24  
Accession #:   
([id number]) XR/XR   
lumbar spine   
AP/LAT/FLX/EXT: M47.817   
- Spondylosis without  
myelopathy or   
radiculopathy...  
  
  
  
  
LUMBAR SPINE WITH   
FLEXION AND EXTENSION   
VIEWS - 4 views  
  
COMPARISON: MRI   
2023  
  
CLINICAL DATA: Back and   
SI joint pain for the   
past year.  
  
Standing AP as well as   
lateral views in   
neutral, flexion and   
extension were   
obtained. There is  
osteopenia.   
Thoracolumbar   
dextroscoliotic   
curvature is again   
seen. There is   
continued concavity at  
the superior endplates   
of L1 and L3. No new   
compression fractures   
are noted. There is   
still  
approximately 5 mm of   
anterolisthesis of L4   
and L5 and 6-7 mm of   
anterolisthesis of L5   
on S1.  
Alignment does not   
change significantly   
with flexion or   
extension. There is   
disc space narrowing  
from L3-4 down. There   
is mild endplate   
spurring. There is mid   
and lower lumbar facet   
hypertrophy.  
The SI joints are   
intact. There is   
atherosclerotic plaque   
at the aorta and its   
branches.  
  
  
ORDER #: [id number]   
XR/XR lumbar spine   
AP/LAT/FLX/EXT  
IMPRESSION:  
  
OSTEOPENIA, SCOLIOSIS   
AND DEGENERATIVE   
CHANGE.  
  
OLD L1 AND L3   
COMPRESSION   
DEFORMITIES.  
  
Impression dictated by:   
Susana Wray M.D.2024 4:20 PM  
  
  
Dictation Location:   
[place] [address]  
  
  
  
Transcribed By: [facility]   
24 1620  
Dictated By: Susana Wray MD 24   
1617  
  
Signed By:  
24 1620       Normal                                  [facility]   
Physician   
Group  
   
                                                    COLONOSCOPYon 2023   
   
                                        Colonoscopy         Table formatting fro  
m   
the original result was   
not included.       Normal                                  [facility]  
   
                                                    No Panel Informationon    
   
                                                            Table formatting fro  
m   
the original result was   
not included.  
Impression  
3 subcentimeter polyps   
in the transverse colon   
were removed with cold  
forceps biopsy; placed   
1 clip successfully  
Diverticulosis in the   
sigmoid colon  
The cecum, ascending   
colon, hepatic flexure   
and descending colon   
appeared  
normal.  
  
  
Findings  
Three sessile polyps   
measuring smaller than   
5 mm in the transverse   
colon;  
performed cold forceps   
biopsy with complete en   
bloc removal; placed 1   
clip  
successfully (clip is   
MRI conditional)  
Multiple diverticula in   
the sigmoid colon  
The cecum, ascending   
colon, hepatic flexure   
and descending colon   
appeared  
normal.;  
  
  
Recommendation  
Repeat colonoscopy in 1   
year  
Personal history of   
colon polyps  
  
Sub optimal colon prep.  
  
  
  
  
Indication  
Polyp of colon  
  
Staff  
Staff Role  
No Staff Documented  
  
  
Medications  
See Anesthesia Record.  
  
Preprocedure  
A history and physical   
has been performed, and   
patient medication  
allergies have been   
reviewed. The patient's   
tolerance of previous  
anesthesia has been   
reviewed. The risks and   
benefits of the   
procedure and  
the sedation options   
and risks were   
discussed with the   
patient. All  
questions were answered   
and informed consent   
obtained.  
  
Details of the   
Procedure  
The patient underwent   
monitored anesthesia   
care, which was   
administered by  
an anesthesia   
professional. The   
patient's blood   
pressure, ECG, ETCO2,  
heart rate, level of   
consciousness, oxygen   
and respirations were   
monitored  
throughout the   
procedure. A digital   
rectal exam was   
performed. The scope  
was introduced through   
the anus and advanced   
to the cecum. The   
quality of  
bowel preparation was   
evaluated using the   
Stratford Bowel   
Preparation Scale  
with scores of: right   
colon = 2, transverse   
colon = 3, left colon =   
2. The  
total BBPS score was 7.   
Bowel prep was   
adequate. The patient's   
estimated  
blood loss was minimal   
(<5 mL). The procedure   
was moderately   
difficult due  
to angulation,   
fixation, loops in the   
digestive tract and   
tortuous colon.  
In response to   
procedure difficulty,   
counter pressure was   
applied. The  
patient tolerated the   
procedure well. There   
were no apparent   
adverse  
events.  
  
Events  
Procedure Events  
Event Event Time  
ENDO SCOPE IN TIME   
2023 7:33 AM  
ENDO CECUM REACHED   
2023 7:56 AM  
ENDO SCOPE OUT TIME   
2023 8:09 AM  
  
  
Specimens  
ID Type Source Tests   
Collected by Time  
1 : Tissue COLON -   
TRANSVERSE POLYP   
SURGICAL PATHOLOGY EXAM   
Julius Goldstein MA 2023 0755  
  
  
Procedure Location  
[facility]  
[address] [facility] [address]  
  
Referring Provider  
Ana Oreilly Md  
[address] [address]  
  
Procedure Provider  
Ana Oreilly MD  
                                                            [facility]  
Work Phone:   
[phone]  
   
                                                                  [facility]  
Work Phone:   
[phone]  
   
                                                    Radiology Study   
observation   
(narrative)                                                     [facility]  
Work Phone:   
[phone]  
   
                                                    Surgical pathology studyon 1  
2023   
   
                                                    Surgical pathology   
study                                   Pathology report.total  
SEE COMMENT  
Surgical Pathology   
Case: [id number]  
Authorizing Provider:   
Ana Oreilly MD   
Collected: 2023   
0755  
Ordering Location: [facility]   
Received: 2023   
0911  
Pathologist: Shawnee Acosta MD PhD  
Specimen: COLON -   
TRANSVERSE POLYP  
Path report.final   
diagnosis  
SEE COMMENT  
A. Colon, transverse,   
polyp, polypectomy:  
-Colonic mucosa with   
lymphoid aggregate(s).  
Electronically signed   
by Shawnee Acosta MD PhD on   
12/15/2023 at 4:40 PM  
Laboratory comment  
By the signature on   
this report, the   
individual or group   
listed as making the   
Final   
Interpretation/Diagnosi  
s certifies that they   
have reviewed this   
case.  
Path report.relevant Hx  
SEE COMMENT  
Encounter for screening   
for malignant neoplasm   
of colon [Z12.11]  
Polyp of colon [K63.5]  
Path report.gross   
observation  
SEE COMMENT  
A: Received in   
formalin, labeled with   
the patient's name and   
hospital number and   
 transverse polyp , are   
multiple fragments of   
tan, soft tissue   
aggregating to 0.9 x   
0.2 x 0.2 cm. The   
specimen is submitted   
in toto in one   
cassette.  
[facility]  
   
                                                    SURGICAL PATHOLOGY RESULTSon  
 2023   
   
                                        Pathology Report    Name ELIO BRAVO  
[facility]  
  
Accession #: [id number]  
Pathologist: CRUZ NAVA M.D.  
Date of Procedure:   
2023  
Date Received:   
2023  
Date Reported 2023  
Submitting Physician:   
ANA OREILLY MD  
Location: [facility]   
External #  
  
  
  
  
  
FINAL DIAGNOSIS  
A. TRANSVERSE COLON,   
POLYP, BIOPSY:  
-- FRAGMENTS OF SESSILE   
SERRATED POLYP WITHOUT   
DYSPLASIA.  
  
  
  
  
  
  
  
  
  
  
Electronically Signed   
Out By CRUZ NAVA M.D./[facility]  
By the signature on   
this report, the   
individual or group   
listed as making the  
Final   
Interpretation/Diagnosi  
s certifies that they   
have reviewed this   
case.  
Diagnostic   
interpretation   
performed at [address] [address]  
  
  
Clinical History:  
Physician Contact   
Number: [phone]  
Ischemic Time (A):   
11:40  
Fixative (A): Formalin  
Fixative Time (A):   
11:40  
Clinical Diagnosis   
History EMR-POLYP  
  
Specimens Submitted As:  
A: TRANSVERSE COLON   
POLYP  
  
Gross Description:  
Received in formalin,   
labeled with the   
patient's name and   
hospital number and  
 A, transverse colon   
polyp , are multiple   
fragments of tan, soft   
tissue  
aggregating to 1.0 x   
1.0 x 0.2 cm. The   
specimen is submitted   
in toto in two  
cassettes.  
AE  
  
  
aey/2023  
  
  
  
  
[facility]  
Department of Pathology  
[address] [place] [address]  
   
                                                    Colonoscopyon 2023   
   
                                                            Ana Oreilly MD -   
2023 Formatting   
of this note might be   
different from the   
original.  
Patient Name: Payton Bravo  
Procedure Date:   
2023 10:33 AM  
MRN: [id number]  
Account Number:   
[id number]  
YOB: 1950  
Admit Type: Outpatient  
Site: [facility]   
Room 1  
Ethnicity: Not    
or   
Race: White  
Attending MD: Ana Oreilly MD, [id number]  
Procedure: Colonoscopy  
Indications:   
Therapeutic procedure,   
This patient was   
referred for a  
therapeutic procedure,   
Therapeutic procedure   
for colon  
polyps, Therapeutic   
procedure for known   
colon adenoma,  
Therapeutic procedure   
for known colon polyp  
Providers: Ana Oreilly MD (Doctor),   
Papo Centeno RN  
(Nurse), Chandan Contreras,   
Technician  
Referring:  
Medicines: See the   
Anesthesia note for   
documentation of the  
administered   
medications  
Complications: No   
immediate   
complications.   
Estimated blood loss:   
None.  
Procedure:   
Pre-Anesthesia   
Assessment:  
- Prior to the   
procedure, a History   
and Physical was  
performed, and patient   
medications and   
allergies were  
reviewed. The patient's   
tolerance of previous  
anesthesia was also   
reviewed. The risks and   
benefits  
of the procedure and   
the sedation options   
and risks  
were discussed with the   
patient. All questions   
were  
answered, and informed   
consent was obtained.   
Prior  
Anticoagulants: The   
patient has taken no   
anticoagulant  
or antiplatelet agents.   
ASA Grade Assessment:   
III - A  
patient with severe   
systemic disease. After   
reviewing  
the risks and benefits,   
the patient was deemed   
in  
satisfactory condition   
to undergo the   
procedure.  
After I obtained   
informed consent, the   
scope was  
passed under direct   
vision. Throughout the   
procedure,  
the patient's blood   
pressure, pulse, and   
oxygen  
saturations were   
monitored continuously.   
The  
Colonoscope was   
introduced through the   
anus and  
advanced to the cecum,   
identified by   
appendiceal  
orifice and ileocecal   
valve. The quality of   
the bowel  
preparation was fair.  
Findings:  
A 25 mm polyp was found   
in the transverse   
colon. The polyp was   
flat.  
Preparations were made   
for mucosal resection.   
Chromoscopy with   
methylene  
blue was done to kaveh   
the borders of the   
lesion. Demarcation of   
the  
lesion was performed to   
clearly identify   
boundaries of the   
lesion. A 0.1  
mg/mL solution of   
epinephrine with   
methylene blue was   
injected to raise  
the lesion. Piecemeal   
mucosal resection using   
a snare was performed.  
Resection and retrieval   
were complete. Resected   
tissue margins were  
examined and clear of   
polyp tissue. For   
hemostasis, four   
hemostatic  
clips were successfully   
placed. There was no   
bleeding at the end of   
the  
procedure.  
The exam was otherwise   
without abnormality on   
direct and retroflexion  
views.  
Moderate Sedation:  
MAC  
Estimated Blood Loss:  
Estimated blood loss:   
none.  
Impression: -   
Preparation of the   
colon was fair.  
- One 25 mm polyp in   
the transverse colon,   
removed  
with mucosal resection.   
Resected and retrieved.   
Clips  
were placed.  
- The examination was   
otherwise normal on   
direct and  
retroflexion views.  
- Mucosal resection was   
performed. Resection   
and  
retrieval were   
complete.  
Recommendation: -   
Repeat colonoscopy in 6   
months for surveillance  
after piecemeal   
polypectomy.  
- NPO for 6 hours, then   
advacne to full liquid   
diet 2  
days as per instruction   
given to the patient  
- Await pathology   
results.  
Procedure Code(s): ---   
Professional ---  
28884, Colonoscopy,   
flexible; with   
endoscopic mucosal  
resection  
Diagnosis Code(s): ---   
Professional ---  
D12.6, Benign neoplasm   
of colon, unspecified  
K63.5, Polyp of colon  
D12.3, Benign neoplasm   
of transverse colon   
(hepatic  
flexure or splenic   
flexure)  
CPT copyright    
American Medical   
Association. All rights   
reserved.  
The codes documented in   
this report are   
preliminary and upon   
 review may  
be revised to meet   
current compliance   
requirements.  
Attending   
Participation:  
I personally performed   
the entire procedure.  
MD Ana Barrett MD  
2023 12:15:42 PM  
This report has been   
signed electronically.  
Number of Addenda: 0  
Note Initiated On:   
2023 10:33 AM  
Scope Withdrawal Time 0   
hours 45 minutes 6   
seconds  
Total Procedure   
Duration Time 1 hour 0   
minutes 27 seconds  
                                                            [facility]  
Work Phone:   
[phone]  
   
                                                    Radiology Study   
observation   
(narrative)                                                     [facility]  
Work Phone:   
[phone]  
   
                                                    ColonoscopyOrdered By: Ana Oreilly on 2023   
   
                                                                  [facility]  
Work Phone:   
[phone]  
0  
   
                                                    No Panel Informationon    
   
                                                                  [facility]  
Work Phone:   
[phone]  
   
                                                            [email or web address]  
[email or web address]  
[email or web address]  
[email or web address]  
[email or web address]}                                            [facility]  
Work Phone:   
[phone]  
   
                                                    Order Reconciliationon    
   
                                        Order Reconciliation Page 1  
  
Discharge   
Reconciliation Document  
  
  
  
Reconciliation Type:   
Discharge requested on   
behalf of Ana Oreilly   
(Physician)  
done by Ana Oreilly)  
  
Discharge -   
Reconciliation:   
2023 10:59 by:   
Ana Oreilly)  Normal                                  [facility]  
   
                                                    Physician Referralon   
023   
   
                                        Physician Referral  [phone]  
304  
[id number]#1.00C  
D:127               Normal                                  [facility]  
   
                                                    Physician Referralon   
023   
   
                                        Physician Referral  [phone]  
302  
[id number]#1.00C  
D:127               Normal                                  [facility]  
   
                                                    XR FOOT LT MIN 3 VIEWSon 01-  
   
   
                                        XR FOOT LT MIN 3 VIEWS EXAM: XR FOOT LT   
MIN 3   
VIEWS  
HISTORY: Contusion of   
left foot with pain  
COMPARISON: None.  
TECHNIQUE: 3 views of   
the left foot were   
obtained.  
FINDINGS: There is an   
oblique fracture   
through the distal   
shaft of the fifth  
metatarsal bone. There   
is 1 mm of overriding   
of the fracture   
fragments and very  
slight medial and   
dorsal angulation of   
the distal fracture   
fragment. The  
fracture lines do not   
extend to the joint   
space.  
There is no other   
evidence of a fracture   
or dislocation. The   
joint space  
otherwise intact.  
IMPRESSION:  
There is an oblique   
fracture involving the   
distal shaft of the   
fifth metatarsal  
bone, with minimal   
overriding and   
angulation of the   
distal fracture   
fragment.  
Electronically   
authenticated by: REKHA MILLER Date: 2023-01-10   
18:49               Normal                                  [facility]  
   
                                                    Basic Metabolic Panelon 12-   
   
                          Anion gap [Moles/Vol] 7 mmol/L     Low          9 - 17  
   
mmol/L                                  [facility]  
   
                          Calcium [Mass/Vol] 8.6 mg/dL                 8.6 - 10.  
4   
mg/dL                                   [facility]  
   
                          Chloride [Moles/Vol] 104 mmol/L                98 - 10  
7   
mmol/L                                  [facility]  
   
                          CO2 [Moles/Vol] 26 mmol/L                 20 - 31   
mmol/L                                  [facility]  
   
                          Creatinine [Mass/Vol] 0.69 mg/dL                0.50 -  
 0.90   
mg/dL                                   [employer]  
   
                      GFR  >60                   >60 mL/min [facility]  
   
                                                    GFR Non-   
American        >60                             >60 mL/min      [facility]  
   
                                                    GFR/1.73 sq   
M.predicted MDRD   
(S/P/Bld) [Vol   
rate/Area]                                                      [facility]  
   
                                        Comment on above:   Average GFR for 70 o  
r more years old:  
75 mL/min/1.73sq m  
Chronic Kidney Disease:  
<60 mL/min/1.73sq m  
Kidney failure:  
<15 mL/min/1.73sq m  
  
  
eGFR calculated using average adult body mass. Additional eGFR   
calculator available at:  
  
http://www.[employer]/multiple_crcl_2012.htm  
  
  
   
   
                                                    GFR/1.73 sq   
M.predicted MDRD   
(S/P/Bld) [Vol   
rate/Area]      NOT REPORTED                                    [facility]  
   
                          Glucose [Mass/Vol] 177 mg/dL    High         70 - 99   
mg/dL                                   [facility]  
   
                                                    Interpretation and   
review of laboratory   
results         Abnormal                                        [facility]  
   
                          Potassium [Moles/Vol] 4.2 mmol/L                3.7 -   
5.3   
mmol/L                                  [facility]  
   
                          Sodium [Moles/Vol] 137 mmol/L                135 - 144  
   
mmol/L                                  [facility]  
   
                                                    Urea nitrogen (BldV)   
[Mass/Vol]      11 mg/dL                        8 - 23 mg/dL    [facility]  
   
                                                    Urea   
nitrogen/Creatinine   
(Bld) [Mass ratio] NOT REPORTED                                    [facility]  
   
                                                    Basic Metabolic Profon    
   
                      (cont.)               Normal                [facility]  
   
                                        Comment on above:   Result Comment: Aver  
age GFR for 70 or more years old:  
75 mL/min/1.73sq m  
Chronic Kidney Disease:  
<60 mL/min/1.73sq m  
Kidney failure:  
<15 mL/min/1.73sq m  
eGFR calculated using average adult body mass. Additional eGFR   
calculator  
available at:  
http://www.[employer]/multiple_crcl_2012.htm   
   
                                                            Performed By: #### C  
DP, BMP ####  
[place] [address]  
[phone]  
: Luis Trinidad MD   
   
                      Anion gap [Moles/Vol] 7 mmol/L   Low        9-17       [facility]  
   
                                        Comment on above:   Performed By: #### C  
DP, BMP ####  
[place] [address]  
[phone]  
: Luis Trinidad MD   
   
                      Calcium [Mass/Vol] 8.6 mg/dL  Normal     8.6-10.4   [facility]  
   
                                        Comment on above:   Performed By: #### C  
DP, BMP ####  
[place] [address]  
[phone]  
: Luis Trinidad MD   
   
                      Chloride [Moles/Vol] 104 mmol/L Normal          [facility]  
   
                                        Comment on above:   Performed By: #### C  
DP, BMP ####  
[place] [address]  
[phone]  
: Luis Trinidad MD   
   
                      CO2 [Moles/Vol] 26 mmol/L  Normal     20-31      [facility]  
   
                                        Comment on above:   Performed By: #### C  
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  
[facility] (DEFAULT)  
[address] [place] [address]   
   
                      Baso Abs#  0.1 x10    Normal     0.0-0.2    [facility]  
   
                                        Comment on above:   Performed By: #### 7  
[id number], [id number] ####  
[facility] (DEFAULT)  
[address] [place] [address]   
   
                                                    Basophils/100 WBC   
(Bld)           0.9 %           Normal          0.2-2.0         [facility]  
   
                                        Comment on above:   Performed By: #### 7  
[id number], [id number] ####  
[facility] (DEFAULT)  
[address] [place] [address]   
   
                      Eos Abs#   0.0 x10    Normal     0.0-0.4    [facility]  
   
                                        Comment on above:   Performed By: #### 7  
[id number], [id number] ####  
[facility] (DEFAULT)  
[address] [place] [address]   
   
                                                    Eosinophils/100 WBC   
(Bld)           0.6 %           Low             0.9-4.0         [facility]  
   
                                        Comment on above:   Performed By: #### 7  
[id number], [id number] ####  
[facility] (DEFAULT)  
[address] [place] [address]   
   
                                                    Lymphocytes (Bld)   
[#/Vol]         1.5 x10         Normal          1.3-2.9         [facility]  
   
                                        Comment on above:   Performed By: #### 7  
[id number], [id number] ####  
[facility] (DEFAULT)  
[address] [place] [address]   
   
                                                    Lymphocytes/100 WBC   
(Bld)           19 %            Normal          14-48           [facility]  
   
                                        Comment on above:   Performed By: #### 7  
[id number], [id number] ####  
[facility] (DEFAULT)  
[address] [place] [address]   
   
                      Mono Abs#  0.6 x10    Normal     0.0-0.8    [facility]  
   
                                        Comment on above:   Performed By: #### 7  
[id number], [id number] ####  
[facility] (DEFAULT)  
[address] [place] [address]   
   
                      Neut Abs#  5.9 x10    Normal     1.5-9.2    [facility]  
   
                                        Comment on above:   Performed By: #### 7  
[id number], [id number] ####  
[facility] (DEFAULT)  
[address] [place] [address]   
   
                                                    Neutrophils/100 WBC   
(Bld)           73 %            Normal          44-88           [facility]  
   
                                        Comment on above:   Performed By: #### 7  
[id number], [id number] ####  
[facility] (DEFAULT)  
[address] [place] [address]   
   
                                                    Anesthesia Noteon 11-  
   
   
                                        Anesthesia Note     Patient: PAYTON BRAVO MRN: [id number]   
FIN: [id number]  
Age: 69 years Sex:   
FEMALE : 1950  
Associated Diagnoses:   
None  
Author: Noman Cherry MD  
Preoperative   
Information  
Anesthesia history:   
Patient history: Nausea   
and vomiting with   
anesthesia.  
Review of Systems  
Constitutional:   
Negative.  
Respiratory: No   
shortness of breath.  
Cardiovascular: No   
chest pain.  
Health Status  
Allergies:  
Allergic Reactions   
(All)  
Severity Not Documented  
Latex Allergy- Rash.  
Penicillin- Hives.  
Current medications:  
Home Medications (1)   
Active  
Glucosamine &   
Chondroitin with MSM 1   
tab(s), PO, Daily  
Problem list (past   
medical history):  
All Problems  
Colitis / SNOMED CT   
[id number] / Confirmed  
Histories  
Family History:  
No family history items   
have been selected or   
recorded.  
Procedure history:  
Open reduction of   
fracture (212387513).  
Comments:  
11/15/2019 10:37 Jodee Domingo RN  
Right leg-  
Abdominal hysterectomy   
([id number]).  
Basal cell carcinoma   
([id number]).  
Comments:  
11/15/2019 10:38 Jodee Domingo RN  
R/O on nose  
Colonoscopy   
([id number]).  
Social History  
Electronic   
Cigarette/Vaping   
Assessment  
Electronic Cigarette   
Use: Never.  
Alcohol Assessment  
Use: Current. Beer, 1-2   
times per year  
Tobacco Assessment  
Former smoker, quit   
more than 30 days ago   
Tobacco Use:. 30   
year(s).  
Comment: Quit smoking   
in 2018- was 1 pk/day   
smoker  
.  
Social & Psychosocial   
Habits  
Alcohol  
11/15/2019 Alcohol Use:   
Current  
Type: Beer  
Frequency: 1-2 times   
per year  
Tobacco  
11/15/2019 Smoking   
tobacco use: Former   
smoker, quit more  
Number of years: 30  
Comment: Quit smoking   
in - was 1 pk/day   
smoker - 11/15/2019   
10:46 - Jodee Daniels RN  
Electronic   
Cigarette/Vaping  
11/15/2019 Electronic   
Cigarette Use: Never  
.  
Physical Examination  
VS/Measurements  
Vital Signs (last 24   
hrs)_____ Last   
Charted___________  
Heart Rate Peripheral   
98 bpm (NOV 15 10:19)  
Resp Rate 18 br/min   
(NOV 15 10:19)  
SBP H 171mmHg (NOV 15   
10:19)  
DBP 85 mmHg (NOV 15   
10:19)  
SpO2 98 % (NOV 15   
10:19)  
Weight 47.200 kg (NOV   
15 10:19)  
General: Alert and   
oriented, No acute   
distress.  
Airway:  
Mallampati   
classification: I (soft   
palate, fauces, uvula,   
pillars visible).  
Mouth: Within normal   
limits.  
Respiratory:   
Respirations are   
non-labored.  
Cardiovascular: Normal   
rate, Regular rhythm.  
Review / Management  
Laboratory Results  
Plan  
American Society of   
Anesthesiologists#(ASA)   
physical status   
classification: Class   
II.  
Anesthetic Preoperative   
Plan  
Anesthesia: General.  
, LMA. Anesthetic plan,   
risks, benefits, and   
alternatives discussed   
with the patient and/or   
family. Patient   
verbalized   
understanding. Informed   
consent was given.   
Consent was signed by   
the patient. Best   
friend present..   
Communication: face to   
face with patient 15   
minutes.  
[Electronically Signed   
on: 11/15/2019 12:30   
EST]  
_______________________  
_________________  
Noman Cherry MD   
[Verified on:   
11/15/2019 12:30 EST]  
_______________________  
_________________  
Noman Cherry MD Normal                                  [facility]  
   
                                                    CBC w/ Auto Diffon 11-  
9   
   
                                                    Erythrocyte   
distribution width   
(RBC) [Ratio]   14.6 %          Normal          11.5-15.0       [facility]  
   
                                        Comment on above:   Performed By: #### 7  
[id number], [id number] ####  
[facility] (DEFAULT)  
[address] [place] [address]   
   
                                                    Hematocrit (Bld)   
[Volume fraction] 42.5 %          High            33.7-40.4       [facility]  
   
                                        Comment on above:   Performed By: #### 7  
[id number], [id number] ####  
[facility] (DEFAULT)  
[address] [place] [address]   
   
                                                    Hemoglobin (Bld)   
[Mass/Vol]      13.6 g/dL       Normal          11.3-15.9       [facility]  
   
                                        Comment on above:   Performed By: #### 7  
584077, [id number] ####  
[facility] (DEFAULT)  
[address] [place] [address]   
   
                      Man Diff?  Auto       Normal                [facility]  
   
                                        Comment on above:   Performed By: #### 7  
617116, [id number] ####  
[facility] (DEFAULT)  
[address] [place] [address]   
   
                                                    MCH (RBC) [Entitic   
mass]           30 pg           Normal          24-34           [facility]  
   
                                        Comment on above:   Performed By: #### 7  
869460, [id number] ####  
[facility] (DEFAULT)  
[address] [place] [address]   
   
                      MCHC (RBC) [Mass/Vol] 32 g/dL    Normal     26-37      [facility]  
   
                                        Comment on above:   Performed By: #### 7  
285733, [id number] ####  
[facility] (DEFAULT)  
[address] [place] [address]   
   
                                                    MCV (RBC) [Entitic   
vol]            94 fL           Normal                    [facility]  
   
                                        Comment on above:   Performed By: #### 7  
069702, [id number] ####  
[facility] (DEFAULT)  
[address] [place] [address]   
   
                                                    Platelet mean volume   
(Bld) [Entitic vol] 10.8 fL         High            6.3-10.2        [facility]  
   
                                        Comment on above:   Performed By: #### 7  
947110, [id number] ####  
[facility] (DEFAULT)  
[address] [place] [address]   
   
                                                    Platelets (Bld)   
[#/Vol]         252 x10         Normal          138-427         [facility]  
   
                                        Comment on above:   Performed By: #### 7  
783067, [id number] ####  
[facility] (DEFAULT)  
[address] [place] [address]   
   
                      RBC (Bld) [#/Vol] 4.52 x10   Normal     3.70-5.30  [facility]  
   
                                        Comment on above:   Performed By: #### 7  
135210, [id number] ####  
[facility] (DEFAULT)  
[address] [place] [address]   
   
                      WBC (Bld) [#/Vol] 8.1 x10    Normal     3.5-10.5   [facility]  
   
                                        Comment on above:   Performed By: #### 7  
678528, [id number] ####  
[facility] (DEFAULT)  
[address] [place] [address]   
   
                                                    Inpatient Patient Summaryon   
11-   
   
                                                    Inpatient Patient   
Summary                                 [address] [address]  
[phone]  
Patient Discharge   
Instructions  
Name: PAYTON BRAVO  
: 1950 MRN:   
[id number] FIN: [id number]  
Patient Address: [address] [place] [address]  
Primary Care Provider:  
Name: Michael GARCIA  
Phone: [phone]  
After you are   
discharged if you find   
you have any questions,   
please, call   
[phone] ext [phone]   
to speak to a nurse.  
Discharge Diagnosis:   
Colles' fracture of   
left radius  
Prescription   
Information: If you   
have been given a   
prescription for   
narcotics, seek   
immediate medical   
attention if you have   
any difficulty   
breathing or any sudden   
status changes such as   
confusion and   
sleepiness.  
If you or anyone you   
know is experiencing   
suicidal thoughts,   
mental health, alcohol   
and/or drug addiction   
problems; contact the   
Mental Health &   
Recovery [facility]    
Crisis Hotline   
1-709.851.3235 -Text   
4HSKU de 206935.  
If you received any   
narcotics, sedation, or   
any other medication   
that causes drowsiness   
for the next 24 hours,   
unless otherwise   
directed:  
? Do not drive a car.  
? Do not operate   
machinery such as power   
tools, lawn mowers,   
drills, sewing   
machines, or stoves  
? Avoid alcoholic   
beverages and drugs for   
allergies, nerves, or   
sleep  
? Do not make important   
personal or business   
decisions or sign any   
legal documents  
[facility] would   
like to thank you for   
allowing us to assist   
you with your   
healthcare needs. The   
following includes   
patient education   
materials and   
information regarding   
your injury/illness.  
PAYTON BRAVO has   
been given the   
following list of   
follow-up instructions,   
prescriptions, and   
patient education   
materials:  
Follow-up Instructions  
With: Address: When:  
SHANA JEAN BAPTISTE [address] [place] [address], [facility]   
[address] [place] [address]  
[phone] Business   
(1) 2019 9:30 AM  
With: Address: When:  
Nam Rodriguez [address] [place], [place] [address] [phone] Business   
(1)  
Medications  
During the course of   
your visit, your   
medication list was   
updated with the most   
current information.   
The details of those   
changes are reflected   
below:  
Medications to Continue   
That Have Not Changed  
Other Medications  
chondroitin/glucosamine  
/methylsulfonylmethane   
(Glucosamine &   
Chondroitin with MSM) 1   
tab(s) Oral every day.  
It is important to   
always keep an active   
list of medications   
available so that you   
can share with other   
providers and manage   
your medications   
appropriately. As an   
additional courtesy, we   
are also providing you   
with your final active   
medications list that   
you can keep with you.  
chondroitin/glucosamine  
/methylsulfonylmethane   
(Glucosamine &   
Chondroitin with MSM) 1   
tab(s) Oral every day.  
Take only the   
medications listed   
above. Contact your   
doctor prior to taking   
any medications not on   
this list.  
Diet & Activity  
Patient Activity Level:  
Patient Diet: Regular  
Patient Activity   
Restrictions:  
Comment:  
Patient education   
materials, if any, will   
display below  
Wrist Fracture Treated   
With ORIF  
A wrist fracture is a   
break or crack in one   
of the bones of your   
wrist. Your wrist is   
made up of eight small   
bones at the palm of   
your hand (carpal   
bones) and two long   
bones that make up your   
forearm (radius and   
ulna).  
(Inserted Image. Unable   
to display)  
If your fracture is   
displaced, that means   
that one or more parts   
of a bone have been   
moved out of normal   
position and the normal   
alignment between bones   
is destroyed. This type   
of fracture is treated   
with a surgical   
procedure that is   
called open reduction   
with internal fixation   
(ORIF). In this   
procedure, the bone   
pieces are put back   
together, and they are   
held in place by   
plates, screws, or   
other types of   
hardware. The procedure   
helps the bones to heal   
properly.  
Tell a health care   
provider about:  
? Any allergies you   
have.  
? All medicines you are   
taking, including   
vitamins, herbs, eye   
drops, creams, and   
over-the-counter   
medicines.  
? Any problems you or   
family members have had   
with anesthetic   
medicines.  
? Any blood disorders   
you have.  
? Any surgeries you   
have had.  
? Any medical   
conditions you have.  
? Whether you are   
pregnant or may be   
pregnant.  
What are the risks?  
Generally, this is a   
safe procedure.   
However, problems may   
occur, including:  
? Infection.  
? Bleeding.  
? Allergic reactions to   
medicines.  
? Damage to other   
structures or organs.  
What happens before the   
procedure?  
? Follow instructions   
from your health care   
provider about eating   
or drinking   
restrictions.  
? Ask your health care   
provider about:  
? Changing or stopping   
your regular medicines.   
This is especially   
important if you are   
taking diabetes   
medicines or blood   
thinners.  
? Taking medicines such   
as aspirin and   
ibuprofen. These   
medicines can thin your   
blood. Do not take   
these medicines before   
your procedure if your   
health care provider   
instructs you not to.  
? Plan to have someone   
take you home after the   
procedure.  
? If you will be going   
home right after the   
procedure, plan to have   
someone with you for 24   
hours.  
? Ask your health care   
provider how your   
surgical site will be   
marked or identified.  
? You may be given   
antibiotic medicine to   
help prevent infection.  
What happens during the   
procedure?  
? To reduce your risk   
of infection:  
? Your health care team   
will wash or sanitize   
their hands.  
? Your skin will be   
washed with soap.  
? An IV tube will be   
inserted into one of   
your veins.  
? You will be given one   
or more of the   
following:  
? A medicine to help   
you relax (sedative).  
? A medicine to numb   
the area (local   
anesthetic).  
? A medicine to make   
you fall asleep   
(general anesthetic).  
? A medicine that is   
injected into an area   
of your body to numb   
everything below the   
injection site   
(regional anesthetic).  
? The surgeon will make   
an incision through   
your skin to expose the   
areas of the fracture.  
? The broken bones will   
be returned to their   
normal positions. To   
hold the bones in   
place, the surgeon will   
use screws and a metal   
plate or different   
types of wiring.  
? The surgeon will   
close the incision with   
stitches (sutures) or   
staples.  
? A bandage (dressing)   
will be placed over   
your incision.  
The procedure may vary   
among health care   
providers and   
hospitals.  
What happens after the   
procedure?  
? Your blood pressure,   
heart rate, breathing   
rate, and blood oxygen   
level will be monitored   
often until the   
medicines you were   
given have worn off.  
? You will be given   
medicine as needed for   
pain.  
? Do not drive for 24   
hours if you received a   
sedative.  
? You may have physical   
therapy while you are   
in the hospital.  
This information is not   
intended to replace   
advice given to you by   
your health care   
provider. Make sure you   
discuss any questions   
you have with your   
health care provider.  
Document Released:   
2016 Document   
Revised: 2017   
Document Reviewed:   
2016  
[employer]   
Patient Education ?   
2019 [employer]  
Viruses or Bacteria  
What?s got you sick?  
Antibiotics only treat   
bacterial infections.   
Viral illnesses cannot   
be treated with   
antibiotics. When an   
antibiotic is not   
prescribed, ask your   
healthcare professional   
for tips on how to   
relieve symptoms and   
feel better.  
Usual Cause  
Illness Viruses   
Bacteria Antibiotic   
Needed  
Cold/Runny Nose NO  
Bronchitis/Chest Cold   
(in otherwise healthy   
children and adults) NO  
Whooping Cough Yes  
Flu NO  
Strep Throat Yes  
Sore Throat (except   
strep) NO  
Fluid in the middle ear   
(otitis media with   
effusion) NO  
Urinary Tract Infection   
Yes  
Antibiotics Aren?t   
Always the Answer  
www.cdc.gov/getsmart   
GET  
SMART  
Know When Antibiotics   
Work  
U.S. Department of   
Health and Human   
Services  
Centers for Disease   
Control and Prevention   
2014      [facility]  
 11-   
   
                                                    [facility] Intraoperative   
Record                                  MAGR Intra-Op Record   
Summary  
Primary Physician:   
Cullen Mcmanus DO  
Case Number:   
[id number]  
Finalized Date/Time:   
11/15/19 14:48:08  
Pt. Name: PAYTON BRAVO D.O.B./Sex: 1950   
FEMALE  
Med Rec #: [id number]  
Physician: Cullen Mcmanus DO  
Financial #: [id number]  
Pt. Type: D  
Room/Bed: /  
Admit/Disch: 11/15/19   
09:50:58 -  
Institution:  
Case Times MAGR  
Entry 1  
Patient  
In Room Time 11/15/19   
13:13:00 Out Room Time   
11/15/19 13:58:00  
Anesthesia  
Start Time 11/15/19   
13:13:00 Stop Time   
11/15/19 14:03:00  
Surgery  
Start Time 11/15/19   
13:35:00 Stop Time   
11/15/19 13:54:00  
Last Modified By: Nicole Puckett RN  
11/15/19 14:07:29  
Case Attendance MAGR  
Entry 1 Entry 2 Entry 3  
Case Attendee   
Cullen Mcmanus Bradley MD Long, Barbara RN Andrew DO  
Role Performed Surgeon   
- Primary   
Anesthesiologist of   
[place]  
Record  
Time In 11/15/19   
13:13:00 11/15/19   
13:13:00 11/15/19   
13:13:00  
Time Out 11/15/19   
13:58:00 11/15/19   
13:58:00 11/15/19   
13:58:00  
Procedure Percutaneous   
Pinning Percutaneous   
Pinning Percutaneous   
Pinning  
and Closed and Closed   
and Closed  
Reductio(Left, Wrist),   
Open Reduction Internal   
Fixation Wrist(Left,   
Wrist) Wrist) Wrist)  
Last Modified By: Nicole Puckett RN, Barbara RN Long, Barbara RN  
11/15/19 14:01:06   
Entry 4 Entry 5 Entry 6  
Case Attendee Milly Yanez Leigh-Ann CST Thacker, Crystal M  
Role Performed Scrub   
Personnel First   
Assistant Radiology   
Technician  
Time In 11/15/19   
13:13:00 11/15/19   
13:13:00 11/15/19   
13:13:00  
Time Out 11/15/19   
13:58:00 11/15/19   
13:58:00 11/15/19   
13:58:00  
Procedure Percutaneous   
Pinning Percutaneous   
Pinning Percutaneous   
Pinning  
and Closed and Closed   
and Closed  
Reductio(Left, Wrist),   
Open Reduction Internal   
Fixation Wrist(Left,   
Wrist) Wrist) Wrist)  
Last Modified By: Nicole Puckett RN, Barbara RN Long, Barbara RN  
11/15/19 14:01:06   
Surgical Procedures   
MAGR  
Pre-Care Text:  
A.20 Verifies operative   
procedure, surgical   
site, and laterality   
Im.150 Develops   
individualized plan of   
care  
Entry 1 Entry 2  
Procedure Percutaneous   
Pinning Open Reduction   
Internal  
and Closed Reduction   
Fixation Wrist  
Primary Procedure Yes   
No  
Primary Surgeon   
Cullen Mcmanus James Andrew DO Marquise DO  
Modifiers Left, Wrist   
Left, Wrist  
Surgeon Comment LEFT   
WRIST FRACTURE, LEFT   
WRIST FRACTURE,  
CLOSED REDUCTION,   
CLOSED REDUCTION,  
POSSIBLE PERC PINNING,   
POSSIBLE PERC PINNING,  
POSSIBLE ORIF POSSIBLE   
ORIF  
Start 11/15/19 13:35:00   
11/15/19 13:35:00  
Stop 11/15/19 13:54:00   
11/15/19 13:54:00  
Anesthesia Type General   
General  
Surgical Service   
Orthopedics Orthopedics  
Wound Class Clean Clean  
Technique Details  
Closure Technique N/A   
N/A  
Entire procedure No No  
was performed via  
laparoscope or  
robotic assistance  
Last Modified By: Nicole Puckett RN, Barbara RN  
11/15/19 13:58:19   
11/15/19 13:58:19  
Post-Care Text:  
O.730 The patient's   
care is consistent with   
the individualized   
perioperative plan of   
care  
General Case Data MAGR  
Pre-Care Text:  
A.350.1 Classifies   
surgical wound  
Entry 1  
Case Information  
OR MAGR OR 05 Case   
Level Level 4  
Wound Class Clean   
Specialty Orthopedics  
ASA Class 2  
Diagnosis  
Preop Diagnosis LEFT   
WRIST FRACTURE Postop   
Same As Preop Yes  
Postop Diagnosis LEFT   
WRIST FRACTURE  
Blunt or No Is the   
procedure Yes  
penetrating injury   
considered  
occured prior to   
Emergent/Urgent?  
the start of the  
procedure:  
Last Modified By: Nicole Puckett RN  
11/15/19 13:48:29  
Post-Care Text:  
O.760 Patient receives   
consistent and   
comparable care   
regardless of the   
setting  
Time Out MAGR  
Entry 1  
Time out date/time   
11/15/19 13:34:00 All   
team members Yes  
have introduced  
themselves by name  
and role  
Surgeon, Yes Surgeon   
reviews Yes  
anesthesia, nurse   
critical or  
confirm patient,   
unexpected steps,  
site, procedure   
operative duration,  
anticipated blood  
loss  
Anesthesia team Yes   
Nursing team Yes  
reviews any reviews   
sterility  
patient-specific   
(including  
concerns indicator   
results)  
and equipment  
issues/concerns  
Antibiotic  
Antibiotic Yes   
Administration Time   
13:15  
prophylaxis given  
within the last 60  
minutes  
Is essential N/A  
imaging displayed?  
Last Modified By: Nicole Puckett RN  
11/15/19 13:40:01  
Patient Positioning   
MAGR  
Pre-Care Text:  
A.280 Identifies   
baseline   
musculoskeletal status   
Im.40 Positions the   
patient Im.80 Applies   
safety devices  
Entry 1  
Procedure Percutaneous   
Pinning Body Position   
Supine  
and Closed  
Reductio(Left, Wrist),  
Open Reduction Internal  
Fixation Wrist(Left,  
Wrist)  
Left Arm Position   
Extended on padded arm   
Right Arm Position   
Extended on padded arm  
board board  
Left Leg Position   
Extended Right Leg   
Position Extended  
Feet Uncrossed? Yes   
Press Points Checked   
Yes  
Positioning Device Arm   
Boards, Arm Strap,   
Outcome Met (O.80) Yes  
Pillow, Safety Strap  
Last Modified By: Nicole Puckett RN  
11/15/19 13:40:12  
Post-Care Text:  
E.290 Evaluates   
musculoskeletal status   
O.80 Patient is free   
from signs and symptoms   
of injury related to  
positioning  
Skin Prep MAGR  
Pre-Care Text:  
A.30 Verifies allergies   
Im.270 Performs skin   
preparation Im.270.1   
Implements protective   
measures to prevent  
skin and tissue injury   
due to chemical sources  
Entry 1  
Skin Prep Syntegrity  
Prep Agents (Im.270)   
Povidone-Iodine Prep By   
Nicole Puckett RN  
Prep Area (Im.270)   
Elbow and forearm, Hand   
Skin Prep Agent Dry Yes  
Without Pooling  
Hair Removal  
Syntegrity  
Hair Removal Methods No   
hair removal  
performed  
Outcome Met (O.100) Yes  
Last Modified By: Nicole Puckett RN  
11/15/19 13:48:17  
Post-Care Text:  
E.10 Evaluates for   
signs and symptoms of   
physical injury to skin   
and tissue O.100   
Patient is free from   
signs  
and symptoms of   
chemical injury  
Medication   
Administration MAGR  
Pre-Care Text:  
A.210 Identifies   
physiological status   
Im.220 Administers   
prescribed medications  
Entry 1  
Time Administered   
11/15/19 13:45:00   
Medication BACITRACIN   
OINT  
Route of Admin TOP By   
Cullen Mcmanus DO  
Outcome Met (O.130) Yes  
Last Modified By: Nicole Puckett RN  
11/15/19 13:45:57  
Post-Care Text:  
E.20 Evaluates response   
to medications O.130   
Patient receives   
appropriately   
administered   
medication(s)  
X-Rays and Images MAGR  
Pre-Care Text:  
A.240 Assesses baseline   
skin condition A.240.1   
Assesses history of   
previous radiation   
exposure Im.110  
Implements protective   
measures to prevent   
injury due to radiation   
sources  
Entry 1  
Site Wrist Site Details   
Left  
X-Ray Type C-Arm   
Outcome Met (O.110) Yes  
Last Modified By: Nicole Puckett RN  
11/15/19 13:46:18  
Post-Care Text:  
E.10 Evaluates for   
signs and symptoms of   
physical injury to skin   
and tissue O.110   
Patient is free from   
signs  
and symptoms of   
radiation injury  
Implant Log MAGR  
Pre-Care Text:  
A.20 Verifies operative   
procedure, surgical   
site, and laterality   
Im.350 Records implants   
inserted during the  
operative or invasive   
procedure  
Entry 1  
Procedure Percutaneous   
Pinning Implant Action   
Implant  
and Closed  
Reductio(Left, Wrist),  
Open Reduction Internal  
Fixation Wrist(Left,  
Wrist)  
Description MARCELINO K   
WIRE  
Implant Information  
Implant/Explant   
11/15/19 13:44:00   
Implanted/Explanted   
Cullen Mcmanus  
Date/Time By: Marquise LARKIN  
Size .-045    
MARCELINO  
Implant Usage Data  
Site Wrist L Quantity 2  
Medical Device  
Sterility  
Outcome Met (O.30) Yes  
Last Modified By: Nicole Puckett RN  
11/15/19 13:50:40  
Post-Care Text:  
E.30 Evaluates   
verification process   
for correct patient,   
site, side and level   
surgery O.30 Patient's   
procedure  
is performed on the   
correct site, side, and   
level  
Dressing/Packing MAGR  
Pre-Care Text:  
A.350 Assesses   
susceptibility for   
infection Im.290   
Administer care to   
wound sites  
Entry 1  
Skin Prep Agent Yes   
Site Wrist  
Removed Prior to  
Dressing?  
Site Details Left  
Dressing Item  
Details  
Dressing Item 4x4's,   
ABD, Roller Gauze   
Splint (Im.290)   
Fiberglass  
(Im.290)  
Outcome Met Yes  
Last Modified By: Nicole Puckett RN  
11/15/19 13:47:37  
Post-Care Text:  
E.200 Evaluates   
progress of wound   
healing O.200 Patient's   
wound perfusion is   
consistent with or   
improved from  
baseline levels  
Departure from OR MAGR  
Entry 1  
Present on Depart   
Oxygen Via Stretcher  
Post-op Destination   
PACU  
Skin DFO  
Condition Dry  
Description  
Condition Intact  
Description  
Report Given To Nicole Puckett RN  
Airway Maintenance  
Patient Status Stable   
Oxygen in Use? Yes  
Airway Device Simple   
mask Flow Rate 15 L  
Last Modified By: Nicole Puckett RN  
11/15/19 13:47:54  
Case Comments  
  
Finalized By: Nicole Puckett RN  
Document Signatures  
Signed By:  
Nicole Puckett RN   
11/15/19 14:07  
Nicole Puckett RN   
11/15/19 14:48  
Unfinalized History  
Date/Time Username   
Reason for Unfinalizing   
Freetext Reason for   
Unfinalizing  
11/15/19 14:47 MHBLONG   
Modify Pick List    [facility]  
   
                                                    MAGR PACU Recordon 11-  
9   
   
                                        MAGR PACU Record    MAGR PACU Record   
Summary  
Primary Physician:   
Cullen Mcmanus DO  
Case Number:   
[id number]  
Finalized Date/Time:   
11/15/19 14:25:30  
Pt. Name: MAC   
PAYTON DOWNEY.O.B./Sex: 1950   
FEMALE  
Med Rec #: [id number]  
Physician: Cullen Mcmanus DO  
Financial #: [id number]  
Pt. Type: D  
Room/Bed: /  
Admit/Disch: 11/15/19   
09:50:58 -  
Institution:  
PACU Case Times MAGR  
Entry 1  
In PACU I 11/15/19   
13:59:00 Discharge from   
PACU 11/15/19 14:25:00  
I  
Last Modified By:   
Jodee Daniels RN  
11/15/19 14:25:27  
General Comments:  
Patient discharged form   
PACU using discharge   
criteria per Dr. Cherry- pt transferred   
to Phase II to [facility].  
Finalized By: Jodee Daniels RN  
Document Signatures  
Signed By:  
Jodee Daniels RN   
11/15/19 14:25      [facility]  
   
                                                    MAGR Postoperative Recordon   
11-   
   
                                                    MAGR Postoperative   
Record                                  MAGR Phase II Record   
Summary  
Primary Physician:   
Cullen Mcmanus DO  
Case Number:   
[id number]  
Finalized Date/Time:   
11/15/19 15:43:40  
Pt. Name: ELIO BRAVODOTTY DOWNEY.O.B./Sex: 1950   
FEMALE  
Med Rec #: [id number]  
Physician: Cullen Mcmanus DO  
Financial #: [id number]  
Pt. Type: D  
Room/Bed: /  
Admit/Disch: 11/15/19   
09:50:58 -  
Institution:  
Phase II Case Times   
MAGR  
Pre-Care Text:  
Patient is free from   
s/s of injury. Patient   
remains free from   
compromised physical   
state related to   
surgery or  
anesthesia. Patient   
comfort maintained.   
Patient/family   
verbalize understanding   
of discharge   
instructions.  
Entry 1  
In PACU II 11/15/19   
14:29:00 Discharge from   
PACU 11/15/19 15:40:00  
II  
Last Modified By:   
Jodee Daniels RN  
11/15/19 15:43:35  
Post-Care Text:  
The patient remains   
free from s/s of   
injury. Patient's vital   
signs stable,   
circulation maintained,   
return to  
preop mental and   
physical status,   
opsite/dressing intact,   
minimal or absent   
nausea and vomiting,   
tolerates po  
intake. Patient   
verbalizes adequate   
pain control.   
Patient/family express   
understanding of   
discharge  
instructions.  
Finalized By: Jodee Daniels RN  
Document Signatures  
Signed By:  
Jodee Daniels RN   
11/15/19 15:43      [facility] Preoperative Recordon 1  
1-   
   
                                                    [facility] Preoperative   
Record                                  MAGR Pre-Op Record   
Summary  
Primary Physician:   
Cullen Mcmanus DO  
Case Number:   
[id number]  
Finalized Date/Time:   
11/15/19 13:37:06  
Pt. Name: PAYTON BRAVO  
/Sex: 1950   
FEMALE  
Med Rec #: [id number]  
Physician: Cullen Mcmanus DO  
Financial #: [id number]  
Pt. Type: D  
Room/Bed: /  
Admit/Disch: 11/15/19   
09:50:58 -  
Institution:  
Pre-Op Case Times MAGR  
Pre-Care Text:  
Patient will be   
optimally prepared for   
surgery. Patient is   
free from s/s of   
injury. Provide   
information to  
patient/family related   
to plan of care. Verify   
patient allergies.   
Confirm identity and   
verify consent before  
the operative or   
invasive procedure.  
Entry 1  
Patient Arrival Time   
11/15/19 10:15:00 Preop   
Departure 11/15/19   
13:10:00  
Last Modified By: Nicole Puckett RN  
11/15/19 13:37:05  
Post-Care Text:  
Patient is prepared   
mentally and physically   
and is ready for   
surgery. The patient   
remains free from s/s   
of  
injury. Patient/family   
express understanding   
of plan of care and   
participate in   
decisions affecting his   
or her  
perioperrative plan of   
care. Allergies   
documented   
appropriately. Patient   
identifiers and consent   
correct.  
General Comments:  
Patient arrives   
ambuilatory to PSW.   
Patient denies any   
recent cold/flu   
symptoms, SOB, sleep   
apnea, diabetes,  
CP, or pacemaker.  
Finalized By: Nicole Puckett RN  
Document Signatures  
Signed By:  
Nicole Puckett RN   
11/15/19 13:37      [facility]  
   
                                                    Operative Report - Surgeon/P  
iban 11-   
   
                                                    Operative Report -   
Surgeon/Physician                       Preoperative diagnosis:   
Colles' fracture left   
wrist  
Postoperative   
diagnosis: same  
Procedure: closed   
reduction of left   
Colles' fracture with   
percutaneous pinning  
Surgeon: JOSE Mcmanus D.O.  
Anesthesia: Gen.  
Indications for   
surgery: the patient   
had a displaced   
fracture of the distal   
radius  
Estimated blood loss:   
scant  
Complications: there   
were no comp case and  
Findings: displaced   
fracture distal radius  
Procedure summary: the   
patient was given   
general anesthesia in   
the timeout was taken.   
Provisional closed   
reduction was performed   
under fluoroscopic   
guidance. Normal   
sterilely prepped and   
draped in usual fashion   
and the reduction was   
fine tuned. Reviewed   
wrist reduced anatomic.   
I inserted 2045 K wires   
in the radial styloid   
distal to proximal   
transfixing the   
fracture. I verified   
good placement with   
fluoroscopic images and   
I took the wrist range   
of motion. The pins   
were bent 90? and   
covered with Mert's   
balls  
Bacitracin sterile   
dressings were applied.   
A very well padded   
fiberglass cast was   
loosely applied.  
[Electronically Signed   
on: 11/15/2019 15:48   
EST]  
_______________________  
_________________  
Cullen Mcmanus DO [Verified on:   
11/15/2019 15:48 EST]  
_______________________  
_________________  
Cullen Mcmanus DO           [facility]  
   
                                                    Patient Handouton 11-  
   
   
                                        Patient Handout     Orthopedics  
Wrist Fracture Treated   
With ORIF  
A wrist fracture is a   
break or crack in one   
of the bones of your   
wrist. Your wrist is   
made up of eight small   
bones at the palm of   
your hand (carpal   
bones) and two long   
bones that make up your   
forearm (radius and   
ulna).  
(Inserted Image. Unable   
to display)  
If your fracture is   
displaced, that means   
that one or more parts   
of a bone have been   
moved out of normal   
position and the normal   
alignment between bones   
is destroyed. This type   
of fracture is treated   
with a surgical   
procedure that is   
called open reduction   
with internal fixation   
(ORIF). In this   
procedure, the bone   
pieces are put back   
together, and they are   
held in place by   
plates, screws, or   
other types of   
hardware. The procedure   
helps the bones to heal   
properly.  
Tell a health care   
provider about:  
? Any allergies you   
have.  
? All medicines you are   
taking, including   
vitamins, herbs, eye   
drops, creams, and   
over-the-counter   
medicines.  
? Any problems you or   
family members have had   
with anesthetic   
medicines.  
? Any blood disorders   
you have.  
? Any surgeries you   
have had.  
? Any medical   
conditions you have.  
? Whether you are   
pregnant or may be   
pregnant.  
What are the risks?  
Generally, this is a   
safe procedure.   
However, problems may   
occur, including:  
? Infection.  
? Bleeding.  
? Allergic reactions to   
medicines.  
? Damage to other   
structures or organs.  
What happens before the   
procedure?  
? Follow instructions   
from your health care   
provider about eating   
or drinking   
restrictions.  
? Ask your health care   
provider about:  
? Changing or stopping   
your regular medicines.   
This is especially   
important if you are   
taking diabetes   
medicines or blood   
thinners.  
? Taking medicines such   
as aspirin and   
ibuprofen. These   
medicines can thin your   
blood. Do not take   
these medicines before   
your procedure if your   
health care provider   
instructs you not to.  
? Plan to have someone   
take you home after the   
procedure.  
? If you will be going   
home right after the   
procedure, plan to have   
someone with you for 24   
hours.  
? Ask your health care   
provider how your   
surgical site will be   
marked or identified.  
? You may be given   
antibiotic medicine to   
help prevent infection.  
What happens during the   
procedure?  
? To reduce your risk   
of infection:  
? Your health care team   
will wash or sanitize   
their hands.  
? Your skin will be   
washed with soap.  
? An IV tube will be   
inserted into one of   
your veins.  
? You will be given one   
or more of the   
following:  
? A medicine to help   
you relax (sedative).  
? A medicine to numb   
the area (local   
anesthetic).  
? A medicine to make   
you fall asleep   
(general anesthetic).  
? A medicine that is   
injected into an area   
of your body to numb   
everything below the   
injection site   
(regional anesthetic).  
? The surgeon will make   
an incision through   
your skin to expose the   
areas of the fracture.  
? The broken bones will   
be returned to their   
normal positions. To   
hold the bones in   
place, the surgeon will   
use screws and a metal   
plate or different   
types of wiring.  
? The surgeon will   
close the incision with   
stitches (sutures) or   
staples.  
? A bandage (dressing)   
will be placed over   
your incision.  
The procedure may vary   
among health care   
providers and   
hospitals.  
What happens after the   
procedure?  
? Your blood pressure,   
heart rate, breathing   
rate, and blood oxygen   
level will be monitored   
often until the   
medicines you were   
given have worn off.  
? You will be given   
medicine as needed for   
pain.  
? Do not drive for 24   
hours if you received a   
sedative.  
? You may have physical   
therapy while you are   
in the hospital.  
This information is not   
intended to replace   
advice given to you by   
your health care   
provider. Make sure you   
discuss any questions   
you have with your   
health care provider.  
Document Released:   
2016 Document   
Revised: 2017   
Document Reviewed:   
2016  
[employer]   
Patient Education ?   
2019 [employer]  [facility]  
   
                                                    XR Fluoroscopy Up to 1 [place]  
n 11-   
   
                                                    XR Fluoroscopy Up to 1   
Hour                                    EXAMINATION: LEFT   
WRIST:  
HISTORY: ORIF LEFT   
WRIST  
COMPARISON: None.  
FLUOROSCOPY TIME:   
Fluoro time measures   
37.8 seconds and 4   
C-arm images were  
obtained and presented   
for review.  
TECHNIQUE: AP and   
lateral C-arm images of   
the left wrist.  
FINDINGS: 2 obliquely   
oriented percutaneous   
pins/wires are seen on   
image 4  
transversing a distal   
radius relatively   
nondisplaced fracture.  
IMPRESSION:  
Percutaneous wire/pin   
fixation of the distal   
radius.  
***** Final *****  
Dictated by: Nam Amato  
Dictated DT/TM:   
11/15/19 3:56  
Signed (Electronic   
Signature): Nam Amato 11/15/19 4:20   
pm  
Technologist: [facility]  
   
                                                    XR Wrist 2 Views Lefton    
   
                                        XR Wrist 2 Views Left EXAMINATION: LEFT   
WRIST:  
HISTORY: ORIF LEFT   
WRIST  
COMPARISON: None.  
FLUOROSCOPY TIME:   
Fluoro time measures   
37.8 seconds and 4   
C-arm images were  
obtained and presented   
for review.  
TECHNIQUE: AP and   
lateral C-arm images of   
the left wrist.  
FINDINGS: 2 obliquely   
oriented percutaneous   
pins/wires are seen on   
image 4  
transversing a distal   
radius relatively   
nondisplaced fracture.  
IMPRESSION:  
Percutaneous wire/pin   
fixation of the distal   
radius.  
***** Final *****  
Dictated by: Nam Amato  
Dictated DT/TM:   
11/15/19 3:56  
Signed (Electronic   
Signature): Nam Amato 11/15/19 4:20   
pm  
Technologist: [employer]   [facility]  
  
  
  
Vital Signs  
  
  
                          Date Time    Vital Sign   Value        Performing   
Clinician                               Facility  
   
                                                    2024   
11:          Body height         160 cm              Cee Michellez NP  
Work Phone:   
[phone]                          [facility]  
   
                                                    2024   
11:                              Body mass index   
(BMI) [Ratio]             20.87 kg/m2               Cee Aichholz NP  
Work Phone:   
[phone]                          [facility]  
   
                                                    2024   
11:          Body temperature    98.49 [degF]        Cee Aichholz NP  
Work Phone:   
[phone]                          [facility]  
   
                                                    2024   
11:          Body weight         53.43 kg            Cee Aichholz NP  
Work Phone:   
[phone]                          [facility]  
   
                                                    2024   
11:                              Diastolic blood   
pressure                  68 mm[Hg]                 Cee Aichholz NP  
Work Phone:   
[phone]                          [facility]  
   
                                                    2024   
11:          Heart rate          84 /min             Cee Aichholz NP  
Work Phone:   
[phone]                          [facility]  
   
                                                    2024   
11:          Respiratory rate    19 /min             Cee Aichholz NP  
Work Phone:   
[phone]                          [facility]  
   
                                                    2024   
11:                              SaO2% (BldA) [Mass   
fraction]                 98 %                      Cee Aichholz NP  
Work Phone:   
[phone]                          [facility]  
   
                                                    2024   
11:                              Systolic blood   
pressure                  124 mm[Hg]                Cee Aichholz NP  
Work Phone:   
[phone]                          [facility]  
   
                                                    2024   
11:          Body weight         53.52 kg            Cee Aichholz  
Work Phone:   
[phone]                          [facility]  
   
                                                    2024   
11:                              Diastolic blood   
pressure                  80 mm[Hg]                 Cee Aichholz  
Work Phone:   
[phone]                          [facility]  
   
                                                    2024   
11:          Heart rate          77 /min             Cee Hand  
Work Phone:   
[phone]                          [facility]  
   
                                                    2024   
11:                              SaO2% (BldA) [Mass   
fraction]                 98 %                      Cee Hand  
Work Phone:   
[phone]                          [facility]  
   
                                                    2024   
11:                              Systolic blood   
pressure                  140 mm[Hg]                Cee Hand  
Work Phone:   
[phone]                          [facility]  
   
                                                    2023   
08:          Body temperature    97.2 [degF]         Ana Oreilly MD  
Work Phone:   
[phone]                          [facility]  
   
                                                    2023   
08:                              Diastolic blood   
pressure                  71 mm[Hg]                 Ana Oreilly MD  
Work Phone:   
[phone]                          [facility]  
   
                                                    2023   
08:          Heart rate          62 /min             Ana Oreilly MD  
Work Phone:   
[phone]                          [facility]  
   
                                                    2023   
08:          Respiratory rate    18 /min             Ana Oreilly MD  
Work Phone:   
[phone]                          [facility]  
   
                                                    2023   
08:                              SaO2% (BldA) [Mass   
fraction]                 98 %                      Ana Oreilly MD  
Work Phone:   
[phone]                          [facility]  
   
                                                    2023   
08:                              Systolic blood   
pressure                  130 mm[Hg]                Ana Oreilly MD  
Work Phone:   
[phone]                          [facility]  
   
                                                    2023   
06:          Body height         162.6 cm            Ana Oreilly MD  
Work Phone:   
[phone]                          [facility]  
   
                                                    2023   
06:                              Body mass index   
(BMI) [Ratio]             18.88 kg/m2               Ana Oreilly MD  
Work Phone:   
[phone]                          [facility]  
   
                                                    2023   
06:          Body weight         49.9 kg             Ana Oreilly MD  
Work Phone:   
[phone]                          [facility]  
   
                                                    2023   
14:          Body weight         52.98 kg            Joss Juares  
Other Phone:   
[phone]                           [facility]   
Professional   
Corporation  
Other Phone:   
[phone]  
   
                                                    2023   
14:                              SaO2% (BldA) [Mass   
fraction]                 98 %                      Joss Juares  
Other Phone:   
[phone]                           [facility]   
Professional   
Corporation  
Other Phone:   
[phone]  
   
                                                    2023   
10:          Body height         157.4 cm            Ana Oreilly MD  
Work Phone:   
[phone]                          [facility]  
   
                                                    2023   
10:                              Body mass index   
(BMI) [Ratio]             21.27 kg/m2               Ana Oreilly MD  
Work Phone:   
[phone]                          [facility]  
   
                                                    2023   
10:          Body weight         52.7 kg             Ana Oreilly MD  
Work Phone:   
[phone]                          [facility]  
   
                                                    2023   
08:          Body height         132.08 cm           Ana Oreilly MD  
Work Phone:   
[phone]                          [facility]  
Work Phone:   
[phone]  
   
                                                    2023   
08:                              Body mass index   
(BMI) [Ratio]             31.2 kg/m2                Ana Oreilly MD  
Work Phone:   
[phone]                          [facility]  
Work Phone:   
[phone]  
   
                                                    2023   
08:                              Body surface area   
Derived from formula      1.35 m2                   Ana Oreilly MD  
Work Phone:   
[phone]                          [facility]  
Work Phone:   
[phone]  
   
                                                    2023   
08:          Body weight         54.43 kg            Ana Oreilly MD  
Work Phone:   
[phone]                          [facility]  
[place] [facility]  
Work Phone:   
[phone]  
   
                                                    2021   
11:          Body temperature    97.3 [degF]         Marquise Tirado DO  
Work Phone:   
[phone]                          [facility]  
   
                                                    2021   
11:                              Diastolic blood   
pressure                  56 mm[Hg]                 Marquise Tirado DO  
Work Phone:   
[phone]                          [facility]  
   
                                                    2021   
11:          Heart rate          61 /min             Marquise Tirado DO  
Work Phone:   
[phone]                          [facility]  
   
                                                    2021   
11:          Respiratory rate    16 /min             Marquise Tirado DO  
Work Phone:   
[phone]                          [facility]  
   
                                                    2021   
11:                              SaO2% (BldA) [Mass   
fraction]                 95 %                      Marquise Tirado DO  
Work Phone:   
[phone]                          [facility]  
   
                                                    2021   
11:                              Systolic blood   
pressure                  98 mm[Hg]                 Marquise Tirado DO  
Work Phone:   
[phone]                          [facility]  
   
                                                    2021   
23:                              Body mass index   
(BMI) [Ratio]             19.72 kg/m2               Marquise Tirado DO  
Work Phone:   
[phone]                          [facility]  
   
                                                    2021   
23:          Body weight         52.1 kg             Marquise Tirado DO  
Work Phone:   
[phone]                          [facility]  
   
                                                    2021   
11:          Body height         162.6 cm            Marquise Tirado DO  
Work Phone:   
[phone]                          [facility]  
  
  
  
Encounters  
  
  
                          Encounter Date Encounter Type Care Provider Facility  
   
                                                    Start: 2024  
End: 2024     ambulatory          [facility]       Not Available  
   
                                                    Start: 2024  
End: 2024                         Office outpatient visit   
25 minutes                              Cee Hand NP  
Work Phone:   
[phone]                          [facility]   
   
                                        Comment on above:   Open wound of left l  
ower leg, sequela (Primary Dx)   
   
                                                    Start: 07-  
End: 07-     ambulatory          [facility]     [facility]  
   
                                        Start: 2024   Patient encounter   
procedure                               Cee Aichholz NP  
Work Phone:   
[phone]                          [facility]  
   
                                                    Start: 2024  
End: 2024     ambulatory          [facility]       Not Available  
   
                                                    Start: 2024  
End: 2024                         Patient encounter   
procedure                               Cee Hand  
Work Phone:   
[phone]                          [facility]   
[facility]  
Work Phone:   
[phone]  
   
                                                    Start: 2024  
End: 2024           ambulatory                Cee Hand  
Work Phone:   
[phone]                          [facility]  
Work Phone:   
[phone]  
   
                                                    Start: 2024  
End: 2024                         Patient encounter   
procedure                               Cee Hand  
Work Phone:   
[phone]                          [facility]   
[facility]   
[facility]  
Work Phone:   
[phone]  
   
                                                    Start: 2023  
End: 2023     [facility]          [facility]  
   
                                                    Start: 2023  
End: 2023                         Subsequent hospital   
visit by physician                      Ana Oreilly MD  
Work Phone:   
[phone]                          [facility]  
   
                                        Comment on above:   Encounter for screen  
ing for malignant neoplasm of colon   
(Primary   
Dx);  
Polyp of colon   
   
                                                    Start: 2023  
End: 2023     ambulatory          [facility]  
   
Ambulatory  
   
                                                    Start: 2023  
End: 2023           Erroneous Encounter       [facility]   
APRN-CNP  
Work Phone:   
[phone]                          [facility]  
   
                          Start: 2023 (PROC) PROCEDURE Joss DEL RIO  
[facility]  
   
                                                    Start: 2023  
End: 2023           ambulatory                Joss Juares  
Other Phone:   
[phone]                           [facility]   
Professional   
Corporation  
Other Phone:   
[phone]  
   
                                                    Start: 2023  
End: 2023           ambulatory                Jsos Juares  
Other Phone:   
[phone]                           [facility]   
Professional   
Corporation  
Other Phone:   
[phone]  
   
                                        Start: 2023   Office outpatient ne  
w   
45 minutes                Joss Juares               [facility]  
   
                                Start: 2023 AUDIT           Cee Jansen Aichho  
lz  
Work Phone:   
[phone]                          [facility]   
[address] DO  
Work Phone:   
[phone]  
   
                                Start: 2023 Message         Cee Jansen Aichho  
lz  
Work Phone:   
[phone]                          [facility]  
Work Phone:   
[phone]  
   
                                Start: 2023 AUDIT           Cee Jansen Aichho  
lz  
Work Phone:   
[phone]                          [facility]  
Work Phone:   
[phone]  
   
                                                    Start: 2023  
End: 2023     ambulatory          ANA OREILLY        Facility:[id number]  
   
                                                    Start: 2023  
End: 2023                         Subsequent hospital   
visit by physician                      Ana Oreilly MD  
Work Phone:   
[phone]                          [facility]  
   
                                        Comment on above:   Polyp of colon;  
Benign neoplasm of transverse colon;  
Unspecified asthma, uncomplicated;  
Dermatitis, unspecified;  
Personal history of other malignant neoplasm of skin;  
Personal history of nicotine dependence;  
Allergy status to penicillin;  
Benign neoplasm of colon, unspecified   
   
                                Start: 2023 AUDIT           Cee Jansen Aichho  
lz  
Work Phone:   
[phone]                          [facility]  
Work Phone:   
[phone]  
   
                                Start: 2023 AUDIT           Ana DOWNEY  
Work Phone:   
[phone]                          [facility]  
Work Phone:   
[phone]  
   
                                                    Start: 2023  
End: 2023     ambulatory          CNP CEE COLLINS   Facility:[facility]  
   
                                                    Start: 2023  
End: 2023     ambulatory          CNP CEE COLLINS   Facility:[facility]  
   
                          Start: 2023 ambulatory   Dinesh SONI Facility  
:[facility]  
   
                                                    Start: 2023  
End: 2023     ambulatory          REKHA LYNNE  Facility:[facility]  
   
                                                    Start: 01-  
End: 2023     ambulatory          CNP [facility] HAND   Facility:[facility]  
   
                                                    Start: 2021  
End: 2021     ambulatory          MARQUISE TIRADO      [facility]  
   
                                                    Start: 2021  
End: 2021                         Subsequent hospital   
visit by physician                      Marquise Tirado DO  
Work Phone:   
[phone]                          [facility]   
Surg ICU  
   
                                        Comment on above:   Post-op pain (Primar  
y Dx)   
  
  
  
Procedures  
  
  
                          Date         Procedure    Procedure Detail Performing   
Clinician  
   
                                                    Start:   
2024          Mammography                             Cee Hand NP  
Work Phone:   
[phone]  
   
                                                    Start:   
2024                              X-ray of lumbar spine, four   
views                                               Cee Hand  
Work Phone:   
[phone]  
   
                                                    Start:   
2023          DISCHARGE PATIENT                       [facility]  
   
                                                    Start:   
2023          SURGICAL PATHOLOGY EXAM                     [facility]  
   
                                                    Start:   
2023                              PLACE IN OUTPATIENT/HOSPITAL   
AMBULATORY SURGERY                                  [facility]  
   
                                                    Start:   
2023                              Colonoscopy w/biopsy   
single/multiple                                     Historical Provider   
MD  
Work Phone:   
[phone]  
   
                                                    Start:   
2023          Colonoscopy                             Ana Oreilly MD  
Work Phone:   
[phone]  
   
                                                    Start:   
2023          SURGICAL PATHOLOGY RESULTS                     Ana Oreilly MD  
Work Phone:   
[phone]  
   
                                                    Start:   
2023                              Colonoscopy stoma dx   
including collj spec spx                            [facility]  
   
                                                    Start:   
2023  
End: 2023     Colonoscopy                             Cee Hand  
Work Phone:   
[phone]  
   
                                                    Start:   
2021                              Basic metabolic panel calcium   
total                                               Melissa [place] DO  
Work Phone:   
[phone]  
   
                                                    Start:   
2021          Glucose blood reagent strip                     Marquise badillo DO  
Work Phone:   
[phone]  
   
                                                    Start:   
2021          H/O: surgery        S/P sacrocolpopexy  Marquise Tirado DO  
Work Phone:   
[phone]  
   
                                                    Start:   
2021  
End: 2021     Cystourethroscopy                       Marquise Tirado DO  
Work Phone:   
[phone]  
   
                                                    Start:   
2021  
End: 2021                         Laparoscopy colpopexy   
suspension vaginal apex                             Marquise MOREL Soco DO  
Work Phone:   
[phone]  
   
                                                    Start:   
2021  
End: 2021                         VAGINAL REPAIR ANTERIOR AND   
POSTERIOR                                           Marquise MOREL Soco DO  
Work Phone:   
[phone]  
  
  
  
Plan of Treatment  
  
  
                          Date         Care Activity Detail       Author  
   
                                        Start: 2033   Screening for malign  
ant   
neoplasm of colon                                   [facility]  
   
                                        Start: 2033   Screening for malign  
ant   
neoplasm of colon                                   [facility]  
   
                                        Start: 2025   Screening for malign  
ant   
neoplasm of breast        Mammogram                 [facility]  
   
                                                    Start: 2025  
End: 2025                         Patient encounter   
procedure                               2025 10:00 AM EDT   
Office Visit [facility] [address] [place] [address]   
[phone] Cee Hand, DESTINEY [address]   
[place]   
[id number] [phone]   
(Work) [phone]   
(Fax)                                   [facility]  
   
                                        Start: 2025   Medicare Annual   
Wellness (AWV)                          Medicare Annual   
Wellness (AWV)                          [facility]  
   
                                                    Start: 2025  
End: 2025                         Patient encounter   
procedure                               2025 2:20 PM EST   
Office Visit [facility] [address] [place] [address]   
[phone] Cee Hand,  [place]   
[address] [phone]   
(Work) [phone]   
(Fax)                                   [facility]  
   
                                        Start: 2024   Screening for malign  
ant   
neoplasm of colon                                   [facility]  
   
                                        Start: 2023   DARIA, Provider:   
Ana Oreilly, Status:   
Pen, Time: 7:30 AM                      DARIA, Provider:   
Ana Oreilly, Status:   
Pen, Time: 7:30 AM                      [facility]  
Work Phone:   
[phone]  
   
                                                    Start: 2023  
End: 2023                         Patient encounter   
procedure                               2023 7:30 AM EST   
Appointment [facility] [address]   
[place] [address]   
[phone] x1 Ana Oreilly  [address]   
[address] [phone]   
(Work) [phone]   
(Fax)                                   [facility]  
   
                                        Start: 2023   VIJAY, Provider  
:   
Aleks Brar,   
Status: Pen, Time:   
11:30 AM                                VIJAY, Provider:   
Aleks Brar,   
Status: Pen, Time:   
11:30 AM                                [facility]   
[facility]  
Work Phone:   
[phone]  
   
                                                    Start: 2023  
End: 2023                         Telemedicine   
consultation with   
patient                                 2023 11:30 AM EST   
Telemedicine [facility] [address]   
[address] [place] [id number] [phone] Aleks Brar, APRN-   
[address] [place] [address]   
[phone] (Work)   
[phone] (Fax)                      [facility]  
   
                          Start: 2023 Influenza vaccination Influenza Vacc  
ine (#1) [facility]  
   
                                        Start: 2023   DARIA, Provider:   
Ana Oreilly, Status:   
Pen, Time: 12:10 PM                     DARIA, Provider:   
Ana Oreilly, Status:   
Pen, Time: 12:10 PM                     [facility]   
[facility]  
Work Phone:   
[phone]  
   
                          Start: 2021 Influenza vaccination Flu vaccine (#  
1) [employer]  
   
                                        Start: 2015   Pneumococcal Vaccine  
:   
65+ Years (1 - PCV)                     Pneumococcal Vaccine:   
65+ Years (1 - PCV)                     [facility]  
   
                                        Start: 2000   Zoster Vaccines (1 o  
f   
2)                                      Zoster Vaccines (1 of   
2)                                      [facility]  
   
                                        Start: 1990   Screening for malign  
ant   
neoplasm of breast        Mammogram                 [facility]  
   
                                        Start: 1972   DTaP/Tdap/Td Vaccine  
s   
(1 - Tdap)                              DTaP/Tdap/Td Vaccines   
(1 - Tdap)                              [facility]  
   
                          Start: 1968 Hepatitis C screening Hepatitis C Sc  
reening [facility]  
   
                          Start: 1962 COVID-19 Vaccine (1) COVID-19 Vaccin  
e (1) [facility]  
   
                          Start: 1951 COVID-19 Vaccine (#1) COVID-19 Vacci  
ne (#1) [facility]  
   
                          Start: 1950 Lipid panel  Lipid Panel  [facility]  
   
                                        Start: 1950   Medicare Annual   
Wellness Visit                          Medicare Annual   
Wellness Visit (AWV)                    [facility]  
   
                                        Start: 1950   Screening for malign  
ant   
neoplasm of colon                                   [facility]  
   
                                        Start: 1950   Screening for   
osteoporosis              Bone Density Scan         [facility]  
   
                          Start: 1950 Yearly Adult Physical Yearly Adult P  
hysical [facility]  
   
                                                      
End: 2023           Moderate Sedation         Moderate Sedation   
Procedures Routine Once   
for 1 Occurrences   
starting 2023   
until 2023                        [facility]  
Work Phone:   
[phone]  
   
                                        Comment on above:   Once for 1 Occurrenc  
es starting 2023 until 2023   
   
                                                Nasal Cannula Oxygen Nasal Cannu  
la Oxygen   
Respiratory Care   
Routine Daily until   
discontinued starting   
2021                              [employer]  
Work Phone:   
[phone]  
   
                                        Comment on above:   Daily until disconti  
nued starting 2021   
   
                                                            Oxygen therapy [Mini  
mum   
Data Set]                               Initiate Oxygen Therapy   
Protocol Respiratory   
Care Routine Daily   
until discontinued   
starting 2021                     [facility]  
Work Phone:   
[phone]  
   
                                        Comment on above:   Daily until disconti  
nued starting 2021   
   
                                                      
End: 2023                         Pulse oximetry,   
continuous                              Pulse oximetry,   
continuous Respiratory   
Care Routine Continuous   
until discontinued   
starting 2023                     [facility]  
Work Phone:   
[phone]  
   
                                        Comment on above:   Continuous until dis  
continued starting 2023   
   
                                                      
End: 2023           Pulse oximetry, spot      Pulse oximetry, spot   
Respiratory Care   
Routine Once for 1   
Occurrences starting   
2023 until   
2023                              [facility]  
Work Phone:   
[phone]  
   
                                        Comment on above:   Once for 1 Occurrenc  
es starting 2023 until 2023   
   
                                                Spirometry panel Incentive jb  
metry   
Respiratory Care   
Routine Every 1hr while   
awake until   
discontinued starting   
2021                              [employer] Phone:   
[phone]  
   
                                        Comment on above:   Every 1hr while awak  
e until discontinued starting 2021   
   
                                                Surgical Pathology Surgical Path  
ology Lab   
Routine Release Upon   
Ordering for 1   
Occurrences starting   
2021                              [employer] Phone:   
[phone]  
   
                                        Comment on above:   Release Upon Orderin  
g for 1 Occurrences starting 2021   
   
                                                      
End: 2021                         SURGICAL PATHOLOGY   
REPORT                                  SURGICAL PATHOLOGY   
REPORT Lab Routine Once   
for 1 Occurrences   
starting 2021   
until 2021                        [employer] Phone:   
[phone]  
   
                                        Comment on above:   Once for 1 Occurrenc  
es starting 2021 until 2021   
   
                                                            Surgical pathology   
study                                   Surgical Pathology Exam   
Pathology and Cytology   
Timed Encounter for   
screening for malignant   
neoplasm of colon Polyp   
of colon Release Upon   
Ordering for 1   
Occurrences starting   
2023                              [facility]  
Work Phone:   
[phone]  
   
                                        Comment on above:   Release Upon Orderin  
g for 1 Occurrences starting 2023   
  
  
  
Immunizations  
  
  
                      Immunization Date Immunization Notes      Care Provider Anitra lake  
   
                                        2024          tetanus toxoid, redu  
lopez   
diphtheria toxoid, and   
acellular pertussis   
vaccine, adsorbed                                   Cee Hand NP  
Work Phone:   
[phone]                          [facility]  
  
  
  
Payers  
  
  
                          Date         Payer Category Payer        Policy ID  
   
                          2024   Self-pay                    
   
                                2023      Private Health Insurance ROXANNA BARNES Member   
Subscriber Plan / Payer   
(Effective   
2023-Present) Name:   
Payton Bravo Member   
ID: [id number] Relation to   
Subscriber: Self Name:   
Payton Bravo   
Subscriber ID: [id number]   
Payer ID: Not on file Group   
ID: [id number] Type: Not on file   
Address: [facility] [address] [address]                   [id number]  
[id number]  
5  
   
                          05-   Unknown                     
   
                          2015   Medicare                  [id number]  
[id number]  
[id number]  
   
                          1960   Medicare                  [id number]   
[id number]  
[id number]  
   
                          1960   Unknown                   [id number]   
[id number]  
[id number]  
   
                          1950   Unknown                   [id number]   
[id number]  
[id number]  
   
                          1950   Unknown                   [id number]   
[id number]  
[id number]  
   
                          1950   Unknown                   [id number]   
[id number]  
[id number]  
   
                          1950   Unknown                   [id number]   
[id number]  
[id number]  
   
                          1950   Unknown                   [id number]   
[id number]  
[id number]  
   
                          1950   Unknown                   [id number]   
[id number]  
[id number]  
   
                          1950   Unknown                   [id number]   
[id number]  
[id number]  
   
                          1950   Unknown                   [id number]   
[id number]  
[id number]  
   
                          1950   Unknown                   [id number]   
[id number]  
[id number]  
   
                          1950   Unknown                   [id number]   
[id number]  
[id number]  
   
                          1950   Unknown                   [id number]   
[id number]  
[id number]  
   
                          1950   Unknown                   [id number]   
[id number]  
[id number]  
   
                          1950   Unknown                   [id number]   
[id number]  
[id number]  
   
                                       Private Health Insurance [facility] [id number]   
[id number]  
[id number]  
   
                                       Unknown                   [id number]   
[id number]  
[id number]  
  
  
  
Social History  
  
  
                          Date         Type         Detail       Facility  
   
                                                    Start: 2021  
End: 2023                         Tobacco smoking status   
[facility]                      Ex-smoker                 [employer] Phone:   
[phone]  
   
                                                      
End: 2015     History of tobacco use Current smoker      [employer] Phone:   
[phone]  
   
                                                      
End: 2015     History of tobacco use Cigarette Smoker    [employer] Phone:   
[phone]  
   
                                                    Start: 2021  
End: 2023                         Tobacco use and   
exposure                                Smokeless tobacco   
non-user                                [employer] Phone:   
[phone]  
   
                                                    Start: 2021  
End: 2023     Alcohol intake      Ex-drinker (finding) [employer] Phone:   
[phone]  
   
                                        Start: 2021   History SDOH Alcohol  
   
Comment                   socially                  [employer] Phone:   
[phone]  
   
                          Start: 1950 Sex Assigned At Birth Not on file  M  
[employer] Phone:   
[phone]  
   
                                                    Start: 2023  
End: 2023                         Exposure to SARS-CoV-2   
(event)                   Not sure                  [employer]  
Work Phone:   
[phone]  
   
                                                    Start: 2023  
End: 2024     Sex Assigned At Birth                     [facility]  
   
                                                            Tobacco [facility] stat  
[facility]                                    Tobacco smoking   
consumption unknown                     [facility]  
Work Phone:   
[phone]  
   
                                                    Start: 2023  
End: 2023                         Tobacco smoking status   
[facility]                      Never smoked tobacco      [facility]  
Work Phone:   
[phone]  
   
                                                    Start: 2023  
End: 2024                         History of Social   
function                                            [facility]  
   
                          Start: 1950 Sex Assigned At Birth Female       F  
[facility]  
   
                                        Start: 2024   Alcoholic beverage   
intake                                  Current drinker of   
alcohol (finding)                       [facility]  
   
                          Start: 2023 Alcohol Comment 2-4 times a month. N  
[facility]  
  
  
  
Medical Equipment  
  
  
                                Procedure Code  Equipment Code  Equipment Origin  
al   
Text                      Equipment Identifier      Dates  
   
                                                    Edward MINER ()[id number]  
[id number],   
[id number] FDA                          Start:   
2021  
  
  
  
Clinical Notes 2021 to 2024  
Cee Hand NP - 2024 12:28 PM Jay Jay Hand NP - 2024 11:00   
AM ESTPatient InstructionsDischarge InstructionsPre-Sedation Documentation - 
Ana Oreilly MD - 2023 7:30 AM EST  
  
                                Note Date & Type Note            Facility  
   
                                                    2024 History of Presen  
t   
illness Narrative                       Associated Problem(s): Open   
wound of lower leg  
Formatting of this note might   
be different from the   
original.  
Sutures removed, dry clean   
non stick pad applied  
Will refer to wound care to   
continue to monitor and   
facilitate healing  
Recommend increasing protein   
consumption as well  
Elevated LLE to continue to   
help with swelling, which I   
feels is related to dependent   
status of LLE, and will   
continue to improve once   
continued healing  
Electronically signed by Cee Hand NP at 2024   
12:28 PM EST  
Formatting of this note is   
different from the original.  
Images from the original note   
were not included.  
  
Payton Bravo is a 74   
y.o. female presents with   
chief complaint of No chief   
complaint on file.  
  
HPI:  
  
Here for ER fu:  
See [facility] ER HPI for HPI  
11 sutures placed in left   
lower tib/fib region  
Finishing atb, no fever,   
chills, or purulent drainage  
+mild swelling about the   
bandage, as well as mod   
swelling below in the ankle   
region  
+itchiness to lower leg  
  
  
  
SUBJECTIVE:  
  
MEDICATIONS:  
Current Outpatient   
Medications  
Medication Instructions  
cephalexin (KEFLEX) 500 mg, 3   
times daily  
cetirizine-pseudoephedrine   
(ZyrTEC-D) 5-120 MG 12 hr   
tablet 1 tablet, Oral, 2   
times daily  
therapeutic   
multivitamin-minerals   
(Theragran-M) tablet 1   
tablet, Daily  
  
  
ALLERGIES:  
Allergies  
Allergen Reactions  
Latex Unknown  
Nitrofurantoin Monohyd Macro  
Other  
Penicillin G Benzathine &   
Proc Hives  
Wound Dressing Adhesive   
Unknown  
Penicillins Rash  
Other Reaction(s): Unknown  
  
  
REVIEW OF SYMPTOMS:  
  
Review of Systems  
Constitutional: Negative for   
appetite change, chills and   
fever.  
HENT: Negative for   
congestion, ear pain and sore   
throat.  
Eyes: Negative for pain,   
discharge, redness and visual   
disturbance.  
Respiratory: Negative for   
cough, shortness of breath   
and wheezing.  
Cardiovascular: Negative for   
chest pain, palpitations and   
leg swelling.  
Gastrointestinal: Negative   
for abdominal pain, blood in   
stool, constipation,   
diarrhea, nausea and   
vomiting.  
Genitourinary: Negative for   
difficulty urinating, dysuria   
and frequency.  
Musculoskeletal: Negative for   
arthralgias, back pain, joint   
swelling and myalgias.  
Skin: Positive for wound.   
Negative for rash.  
Neurological: Negative for   
dizziness, tremors, seizures,   
syncope and headaches.  
Psychiatric/Behavioral:   
Negative for behavioral   
problems, self-injury and   
suicidal ideas. The patient   
is not nervous/anxious.  
Hematological: Does not   
bruise/bleed easily.  
Endocrine: Negative for   
polydipsia, polyphagia and   
polyuria.  
Allergic/Immunologic:   
Negative for environmental   
allergies and food allergies.  
  
  
PAST MEDICAL HISTORY  
  
Past Medical History:  
Diagnosis Date  
Allergies  
Anemia  
Arthritis  
IBS (irritable bowel   
syndrome)  
Ulcerative colitis (CMS/HCC)  
Uterine cancer (CMS/HCC)  
  
  
Past Surgical History:  
Procedure Laterality Date  
APPENDECTOMY   
HYSTERECTOMY   
ORIF WRIST FRACTURE Left   
11/15/2019  
  
family history is not on   
file.  
  
OBJECTIVE:  
  
Visit Vitals  
/68 (BP Location: Left   
arm, Patient Position:   
Sitting, BP Cuff Size: Adult   
long)  
Pulse 84  
Temp 98.5 F (Temporal)  
Resp 19  
Ht 5' 3   
Wt 117 lb 12.8 oz  
SpO2 98%  
BMI 20.87 kg/m  
Smoking Status Never  
BSA 1.54 m  
  
  
Physical Exam  
Vitals and nursing note   
reviewed.  
Constitutional:  
General: She is not in acute   
distress.  
Appearance: Normal   
appearance.  
HENT:  
Head: Normocephalic and   
atraumatic.  
Right Ear: External ear   
normal.  
Left Ear: External ear   
normal.  
Nose: Nose normal.  
Eyes:  
Extraocular Movements:   
Extraocular movements intact.  
Conjunctiva/sclera:   
Conjunctivae normal.  
Cardiovascular:  
Rate and Rhythm: Normal rate   
and regular rhythm.  
Pulses: Normal pulses.  
Heart sounds: Normal heart   
sounds.  
Pulmonary:  
Effort: Pulmonary effort is   
normal.  
Breath sounds: Normal breath   
sounds.  
Abdominal:  
General: Bowel sounds are   
normal. There is no   
distension.  
Palpations: Abdomen is soft.   
There is no mass.  
Tenderness: There is no   
abdominal tenderness.  
Musculoskeletal:  
General: Normal range of   
motion.  
Cervical back: Normal range   
of motion and neck supple.  
Left lower leg: Edema   
present.  
Skin:  
General: Skin is warm and   
dry.  
Capillary Refill: Capillary   
refill takes 2 to 3 seconds.  
Findings: No rash (lower left   
leg : fleshed colored bumps   
felt, pt suspects this was an   
allergic reaction to wrap   
around leg).  
Comments: Laceration in a   
side ways V shape, w 11   
sutures intact, no   
surrounding s/s infection,   
sutures removed, edges not   
approximated, no drainage can   
be expressed  
No warmth to touch  
  
Neurological:  
General: No focal deficit   
present.  
Mental Status: She is alert   
and oriented to person,   
place, and time.  
Psychiatric:  
Mood and Affect: Mood normal.  
Behavior: Behavior normal.  
Thought Content: Thought   
content normal.  
Judgment: Judgment normal.  
  
  
  
ASSESSMENT AND PLAN:  
  
No follow-ups on file.  
Problem List Items Addressed   
This Visit  
  
Open wound of lower leg -   
Primary  
Sutures removed, dry clean   
non stick pad applied  
Will refer to wound care to   
continue to monitor and   
facilitate healing  
Recommend increasing protein   
consumption as well  
Elevated LLE to continue to   
help with swelling, which I   
feels is related to dependent   
status of LLE, and will   
continue to improve once   
continued healing  
  
  
Relevant Orders  
Ambulatory referral to Wound   
Clinic  
  
  
Electronically signed by Cee Hand NP at 2024   
12:30 PM EST  
documented in this encounter            [facility]  
   
                                        2024 Instructions   
  
  
Cee Hand NP -   
2024 11:00 AM   
ESTFormatting of this note   
might be different from the   
original.  
Keep wound clean and dry,   
elevate leg as much as   
possible  
Referral to [facility],   
they should be calling you  
Protein drink: premier   
Protein, several flavors,   
whit box/red on it  
Electronically signed by Cee Hand NP at 2024   
11:54 AM EST  
Electronically signed by Cee Hand NP at 2024   
11:54 AM EST  
  
documented in this encounter            [facility]  
   
                                                    2023 Hospital Discharg  
e   
instructions                              
  
  
Ana Oreilly MD - 2023   
8:21 AM ESTFormatting of this   
note might be different from   
the original.  
NPO 4 HOURS THEN CLEAR LIQUID   
TODAY  
Electronically signed by   
Ana Oreilly MD at   
2023 8:21 AM EST  
  
documented in this encounter            [facility]  
Work Phone: [phone]  
   
                                        2023 Miscellaneous Notes Formattin  
g of this note is   
different from the original.  
Patient: Payton Bravo  
MRN: [id number]  
  
Pre-sedation Evaluation:  
Sedation necessary for:   
Immobility and Analgesia  
Requesting service: GI   
service  
  
History of Present Illness:   
Screening colonoscopy  
  
History reviewed. No   
pertinent past medical   
history.  
  
Principle problems:  
There are no problems to   
display for this patient.  
  
Allergies:  
Allergies  
Allergen Reactions  
Adhesive Rash  
Latex Rash  
Penicillin Rash  
  
PTA/Current Medications:  
(Not in a hospital admission)  
  
Current Outpatient   
Medications  
Medication Sig Dispense   
Refill  
loratadine 10 mg capsule Take   
by mouth.  
  
Current Facility-Administered   
Medications  
Medication Dose Route   
Frequency Provider Last Rate   
Last Admin  
lactated Ringer's infusion 20   
mL/hr intravenous Continuous   
Ana Oreilly MD  
  
Past Surgical History:  
has a past surgical history   
that includes Hip   
Arthroplasty and   
Hysterectomy.  
  
Recent sedation/surgery (24   
hours) No  
  
Review of Systems:  
Please check all that apply:   
No significant medical   
history  
  
Pregnancy test completed   
prior to procedure on any   
menstruating female: none  
  
NPO guidelines met: Yes  
  
Physical Exam  
  
Airway  
Mallampati: II  
  
Cardiovascular - normal exam  
Rhythm: regular  
Rate: normal  
  
Dental  
Pulmonary - normal exam  
Breath sounds clear to   
auscultation  
  
  
Plan  
  
ASA 3  
  
Moderate  
  
  
Electronically signed by   
Ana Oreilly MD at   
2023 7:18 AM EST  
documented in this encounter            [facility]  
Work Phone: [phone]  
   
                                        2023 Note     Formatting of this n  
ote is   
different from the original.  
Patient: Payton Bravo  
MRN: [id number]  
  
Pre-sedation Evaluation:  
Sedation necessary for:   
Immobility and Analgesia  
Requesting service: GI   
service  
  
History of Present Illness:   
Screening colonoscopy  
  
History reviewed. No   
pertinent past medical   
history.  
  
Principle problems:  
There are no problems to   
display for this patient.  
  
Allergies:  
Allergies  
Allergen Reactions  
Adhesive Rash  
Latex Rash  
Penicillin Rash  
  
PTA/Current Medications:  
(Not in a hospital admission)  
  
Current Outpatient   
Medications  
Medication Sig Dispense   
Refill  
loratadine 10 mg capsule Take   
by mouth.  
  
Current Facility-Administered   
Medications  
Medication Dose Route   
Frequency Provider Last Rate   
Last Admin  
lactated Ringer's infusion 20   
mL/hr intravenous Continuous   
Ana Oreilly MD  
  
Past Surgical History:  
has a past surgical history   
that includes Hip   
Arthroplasty and   
Hysterectomy.  
  
Recent sedation/surgery (24   
hours) No  
  
Review of Systems:  
Please check all that apply:   
No significant medical   
history  
  
Pregnancy test completed   
prior to procedure on any   
menstruating female: none  
  
NPO guidelines met: Yes  
  
Physical Exam  
  
Airway  
Mallampati: II  
  
Cardiovascular - normal exam  
Rhythm: regular  
Rate: normal  
  
Dental  
Pulmonary - normal exam  
Breath sounds clear to   
auscultation  
  
  
Plan  
  
ASA 3  
  
Moderate  
  
  
Electronically signed by   
Ana Oreilly MD at   
2023 7:18 AM EST  
                                        [facility]  
Work Phone: [phone]  
   
                                        2023 Note     Formatting of this n  
ote is   
different from the original.  
Patient: Payton Bravo  
MRN: [id number]  
  
Pre-sedation Evaluation:  
Sedation necessary for:   
Immobility and Analgesia  
Requesting service: GI   
service  
  
History of Present Illness:   
Screening colonoscopy  
  
History reviewed. No   
pertinent past medical   
history.  
  
Principle problems:  
There are no problems to   
display for this patient.  
  
Allergies:  
Allergies  
Allergen Reactions  
Adhesive Rash  
Latex Rash  
Penicillin Rash  
  
PTA/Current Medications:  
(Not in a hospital admission)  
  
Current Outpatient   
Medications  
Medication Sig Dispense   
Refill  
loratadine 10 mg capsule Take   
by mouth.  
  
Current Facility-Administered   
Medications  
Medication Dose Route   
Frequency Provider Last Rate   
Last Admin  
lactated Ringer's infusion 20   
mL/hr intravenous Continuous   
Ana Oreilly MD  
  
Past Surgical History:  
has a past surgical history   
that includes Hip   
Arthroplasty and   
Hysterectomy.  
  
Recent sedation/surgery (24   
hours) No  
  
Review of Systems:  
Please check all that apply:   
No significant medical   
history  
  
Pregnancy test completed   
prior to procedure on any   
menstruating female: none  
  
NPO guidelines met: Yes  
  
Physical Exam  
  
Airway  
Mallampati: II  
  
Cardiovascular - normal exam  
Rhythm: regular  
Rate: normal  
  
Dental  
Pulmonary - normal exam  
Breath sounds clear to   
auscultation  
  
  
Plan  
  
ASA 3  
  
Moderate  
  
  
Electronically signed by   
Ana Oreilly MD at   
2023 7:18 AM EST  
                                        [facility]  
Work Phone: [phone]  
   
                                                    2023 History of Presen  
t   
illness Narrative                       Formatting of this note might   
be different from the   
original.  
error  
Electronically signed by   
NORA Chino   
at 2023 11:41 AM EST  
documented in this encounter            [facility]  
Work Phone: [phone]  
  
  
  
                                                    2023 Evaluation note   
  
  
  
                                                    Encounter   
Date                      Diagnosis                 Assessment Notes  
   
                                                                                        Sacroiliitis   
(ICD-10 - M46.1)                                            73 year old   
female presents   
with complaints   
of low back and   
right hip pain   
with radiation   
into the buttocks   
and into the   
groin on the   
right side. She   
states pain has   
been present for   
approximately 1   
year. She notes   
she was seen by   
Leonardo Gay at   
Dr Early's   
office and was   
treated with a   
right GT bursa   
injection 1 month   
ago however she   
feels this did   
not provide   
significant   
relief. She tried   
physical therapy   
with no relief of   
her symptoms. She   
feels pain is   
negatively   
impacting her   
daily activities   
and sleeping   
pattern. Prior to   
examining the   
patient, I   
reviewed progress   
notes from her   
referring   
provider Leonardo Gay. I also   
independently   
reviewed previous   
imaging of the   
lumbar spine   
which shows   
degenerative   
changes as well   
asfacet   
arthropathy.   
Anatomy of spine   
discussed in   
detail with   
patient in   
regards to   
patients   
condition.   
Patient is a   
candidate for a   
bilateral   
sacroiliac joint   
injection under   
fluoroscopic   
guidance. Risks   
and benefits of   
procedure   
explained to   
patient; patient   
verbalizes   
understanding.  
  
   
                                                                                        Lumbosacral   
spondylosis   
(ICD-10 -   
M47.817)                                                    In the future if   
the pain   
persists, we can   
consider   
proceeding with a   
bilateral lumbar   
facet MBB   
followed by a RFA   
if applicable   
under   
fluoroscopic   
guidance.  
  
   
                                                                                        Osteoarthritis of   
right hip (ICD-10   
- M16.11)                                                   Recent imaging of   
the right hip   
shows moderate   
arthritic   
changes. If her   
pain persists, we   
can consider   
proceeding with a   
right hip & GT   
bursa injection   
under   
fluoroscopic   
guidance.  
  
   
                                                                                        Chronic pain   
(ICD-10 - G89.29)                                           Follow up after   
procedure  
  
   
                                                                Other                                           Medical decision  
   
making shows a   
new problem to me   
with further   
workup planned or   
suggested with   
the potential for   
extensive   
treatment options   
that were   
considered with   
the most   
applicable given   
this patient's   
situation as   
noted above.   
Treatment options   
considered   
include a   
combination of   
physical therapy   
approaches,   
pharmacologic   
management, and   
interventional   
procedures. Those   
most applicable   
to the patient   
were discussed at   
this time. Risk   
of complications   
and/or morbidity   
and mortality is   
high given that   
acute and chronic   
pain poses a   
threat to life   
and bodily   
function if   
undertreated,   
poorly treated or   
with failure to   
maintain adequate   
treatment and   
timely followup.   
Given the serious   
and fluctuating   
nature of pain   
with extensive   
consideration for   
whenever pain   
changes, there   
always remains   
the possibility   
of prolonged   
functional   
impairment   
requiring   
constant patient   
reassessment and   
high-level   
medical decision   
making. The   
amount and   
complexity of   
data reviewed is   
high given that   
patient labs,   
radiology   
reports, and   
other test were   
obtained,   
reviewed and   
summarized as   
applicable from   
the physician   
portal and/or   
outside medical   
records.   
Pertinent   
positive and   
negative findings   
were considered   
in medical   
decision-making.  
  
  
[employer]  
Other Phone: [phone]- NotePatient Name: Payton Bravo  
Procedure Date: 2023 10:33 AM  
MRN: [id number]  
Account Number: [id number]  
YOB: 1950  
Admit Type: Outpatient  
Site: [facility]  
Ethnicity: Not  or   
Race: White  
Attending MD: Ana Oreilly MD, [id number]  
Procedure: Colonoscopy  
Indications: Therapeutic procedure, This patient was referred for a  
therapeutic procedure, Therapeutic procedure for colon  
polyps, Therapeutic procedure for known colon adenoma,  
Therapeutic procedure for known colon polyp  
Providers: Ana Oreilly MD (Doctor), Papo Centeno RN  
(Nurse), Chandan Contreras, Technician  
Referring:  
Medicines:  See the Anesthesia note for documentation of the  
administered medications  
Complications: No immediate complications. Estimated blood loss: None.  
Procedure: Pre-Anesthesia Assessment:  
- Prior to the procedure, a History and Physical was  
performed, and patient medications and allergies were  
reviewed. The patient's tolerance of previous  
anesthesia was also reviewed. The risks and benefits  
of the procedure and the sedation options and risks  
were discussed with the patient. All questions were  
answered, and informed consent was obtained. Prior  
Anticoagulants: The patient has taken no anticoagulant  
or antiplatelet agents. ASA Grade Assessment: III - A  
patient with severe systemic disease. After reviewing  
the risks and benefits, the patient was deemed in  
satisfactory condition to undergo the procedure.  
After I obtained informed consent, the scope was  
passed under direct vision. Throughout the procedure,  
the patient's blood pressure, pulse, and oxygen  
saturations were monitored continuously. The  
Colonoscope was introduced through the anus and  
advanced to the cecum, identified by appendiceal  
orifice and ileocecal valve. The quality of the bowel  
preparation was fair.  
Findings:  
A 25 mm polyp was found in the transverse colon. The polyp was flat.  
Preparations were made for mucosal resection. Chromoscopy with methylene  
blue was done to kaveh the borders of the lesion. Demarcation of the  
lesion was performed to clearly identify boundaries of the lesion. A 0.1  
mg/mL solution of epinephrine with methylene blue was injected to raise  
the lesion. Piecemeal mucosal resection using a snare was performed.  
Resection and retrieval were complete. Resected tissue margins were  
examined and clear of polyp tissue. For hemostasis, four hemostatic  
clips were successfully placed. There was no bleeding at the end of the  
procedure.  
The exam was otherwise without abnormality on direct and retroflexion  
views.  
Moderate Sedation:  
MAC  
Estimated Blood Loss:  
Estimated blood loss: none.  
Impression: - Preparation of the colon was fair.  
- One 25 mm polyp in the transverse colon, removed  
with mucosal resection. Resected and retrieved. Clips  
were placed.  
- The examination was otherwise normal on direct and  
retroflexion views.  
- Mucosal resection was performed. Resection and  
retrieval were complete.  
Recommendation: - Repeat colonoscopy in 6 months for surveillance  
after piecemeal polypectomy.  
- NPO for 6 hours, then advacne to full liquid diet 2  
days as per instruction given to the patient  
- Await pathology results.  
Procedure Code(s): --- Professional ---  
84245, Colonoscopy, flexible; with endoscopic mucosal  
resection  
Diagnosis Code(s): --- Professional ---  
D12.6, Benign neoplasm of colon, unspecified  
K63.5, Polyp of colon  
D12.3, Benign neoplasm of transverse colon (hepatic  
flexure or splenic flexure)  
CPT copyright  American Medical Association. All rights reserved.  
The codes documented in this report are preliminary and upon  review may  
be revised to meet current compliance requirements.  
Attending Participation:  
I personally performed the entire procedure.  
MD Ana Barrett M (more content not included)...PROVATION - OS02- Note
PROCEDURE: XR FOOT LT MIN 3 VIEWS  
HISTORY: Pain in left foot ; follow-up fifth metatarsal fracture  
COMPARISON: XR foot left 3/16/2023  
FINDINGS:  
BONES:Stable normal alignment and increasing density of the fracture line of the  
distal fifth metatarsal diaphysis.  
SOFT TISSUES:No visible soft tissue swelling.  
EFFUSION:None visible.  
OTHER: Negative.  
IMPRESSION:  
1. Stable alignment and ongoing bone healing of the fifth metatarsal fracture.  
Electronically authenticated by: BRODERICK MARQUES Date: 2023 14:43[facility]03- NotePROCEDURE: XR FOOT LT MIN 3 VIEWS  
HISTORY: Pain in left foot ; follow-up fifth metatarsal fracture  
COMPARISON: XR foot left 2023  
FINDINGS:  
BONES:Stable, normal alignment of the fifth metatarsal with increasing density  
of previously seen oblique fracture line.  
SOFT TISSUES:No visible soft tissue swelling.  
EFFUSION:None visible.  
OTHER: Negative.  
IMPRESSION:  
1. Stable alignment and ongoing bone healing of the distal fifth metatarsal  
diaphyseal fracture.  
Electronically authenticated by: BRODERICK MARQUES Date: 2023 15:19[facility]02- NotePROCEDURE: XR FOOT LT MIN 3 VIEWS  
HISTORY: Pain in left foot ; follow-up left fifth metatarsal fracture  
COMPARISON: None.  
FINDINGS:  
BONES:Slight increased density at fracture line and mild callus formation along  
margins of the oblique fracture of the fifth metatarsal distal diaphysis.  
SOFT TISSUES:No visible soft tissue swelling.  
EFFUSION:None visible.  
OTHER: Negative.  
IMPRESSION:  
1. Stable alignment and early bone healing changes involving the fifth  
metatarsal fracture.  
Electronically authenticated by: BRODERICK MARQUES Date: 2023 13:08[facility]12- Hospital Discharge instructions* Pharmacy*   
  
Cee Arthur RN - 2021 1:17 PM EST  
  
  
  
Formatting of this note might be different from the original.  
  
  
tamsulosin  
Pronunciation: espinal stuart LANE sin  
Brand: Flomax  
What is the most important information I should know about tamsulosin?  
Follow all directions on your medicine label and package. Tell each of your 
healthcare providers about all your medical conditions, allergies, and all 
medicines you use.  
What is tamsulosin?  
Tamsulosin is an alpha-blocker that is used to improve urination in men with 
benign prostatic hyperplasia (enlarged prostate).  
Tamsulosin is not approved for use in women or children.  
Tamsulosin may also be used for purposes not listed in this medication guide.  
What should I discuss with my healthcare provider before taking tamsulosin?  
You should not use tamsulosin if you are allergic to it.  
Tell your doctor if you have ever had:  
liver or kidney disease;  
prostate cancer;  
low blood pressure; or  
an allergy to sulfa drugs.  
Tamsulosin can affect your pupils. If you have cataract surgery, tell your 
surgeon ahead of time that you use this medicine.  
Tamsulosin is not for use in women, and the effects of this medicine during 
pregnancy or in breastfeeding women are unknown.  
How should I take tamsulosin?  
Your doctor may test your prostate specific antigen (PSA) to check for prostate 
cancer before you take tamsulosin.  
Follow all directions on your prescription label and read all medication guides 
or instruction sheets. Your doctor may occasionally change your dose. Use the 
medicine exactly as directed.  
Tamsulosin is usually taken once a day, approximately 30 minutes after the same 
meal each day.  
Swallow the capsule whole and do not crush, chew, break, or open it.  
Your blood pressure will need to be checked often.  
Some things can cause your blood pressure to get too low. This includes 
vomiting, diarrhea, or heavy sweating. Call your doctor if you are sick with 
vomiting or diarrhea.  
Store at room temperature away from moisture and heat.  
If you stop taking tamsulosin for any reason, call your doctor before you start 
taking it again. You may need a dose adjustment.  
What happens if I miss a dose?  
Take the medicine as soon as you can, but skip the missed dose if it is almost 
time for your next dose. Do not take two doses at one time.  
If you miss your doses for several days in a row, talk with your doctor before 
restarting the medication.  
What happens if I overdose?  
Seek emergency medical attention or call the Poison Help line at 1-514.822.3271.  
What should I avoid while taking tamsulosin?  
Avoid driving or hazardous activity until you know how this medicine will affect
you. Your reactions could be impaired. Avoid getting up too fast from a sitting 
or lying position, or you may feel dizzy.  
What are the possible side effects of tamsulosin?  
Get emergency medical help if you have signs of an allergic reaction (hives, 
difficult breathing, swelling in your face or throat) or a severe skin reaction 
(fever, sore throat, burning eyes, skin pain, red or purple skin rash with 
blistering and peeling).  
Stop using tamsulosin and call your doctor at once if you have:  
a light-headed feeling, like you might pass out; or  
penis erection that is painful or lasts 4 hours or longer.  
Tamsulosin lowers blood pressure and may cause dizziness or fainting, especially
when you first start taking it. You may feel very dizzy when you first wake up. 
Avoid getting up too fast from a sitting or lying position, or you may feel 
dizzy.  
Common side effects may include:  
abnormal ejaculation, decreased amount of semen;  
dizziness, drowsiness, weakness;  
runny nose, cough;  
back pain, chest pain;  
nausea, diarrhea;  
tooth problems;  
blurred vision;  
sleep problems (insomnia); or  
decreased interest in sex.  
This is not a complete list of side effects and others may occur. Call your 
doctor for medical advice about side effects. You may report side effects to FDA
at 5-967-RAC-2774.  
What other drugs will affect tamsulosin?  
Tell your doctor about all your current medicines. Many drugs can increase your 
risk of very low blood pressure while taking tamsulosin, especially:  
medicines similar to tamsulosin (alfuzosin, doxazosin, prazosin, silodosin, or 
terazosin);  
heart or blood pressure medication; or  
sildenafil (Viagra) and other erectile dysfunction medicines.  
This list is not complete and many other drugs may affect tamsulosin. This 
includes prescription and over-the-counter medicines, vitamins, and herbal 
products. Not all possible drug interactions are listed here.  
Where can I get more information?  
Your pharmacist can provide more information about tamsulosin.  
Remember, keep this and all other medicines out of the reach of children, never 
share your medicines with others, and use this medication only for the 
indication prescribed.  
Every effort has been made to ensure that the information provided by [employer]. ('[employer]') is accurate, up-to-date, and complete, but no guarantee 
is made to that effect. Drug information contained herein may be time sensitive.
[employer] information has been compiled for use by healthcare practitioners and 
consumers in the [place] and therefore [employer] does not warrant that uses 
outside of the [place] are appropriate, unless specifically indicated 
otherwise. [employer] drug information does not endorse drugs, diagnose patients 
or recommend therapy. [employer] drug information isan informational resource 
designed to assist licensed healthcare practitioners in caring for their p
atients and/or to serve consumers viewing this service as a supplement to, and 
not a substitute for, the expertise, skill, knowledge and judgment of healthcare
practitioners. The absence of a warningfor a given drug or drug combination in 
no way should be construed to indicate that the drug or drug combination is 
safe, effective or appropriate for any given patient. [employer] does not assume any
responsibility for any aspect of healthcare administered with the aid of 
information [employer] provides. The information contained herein is not intended to
cover all possible uses, directions, precautions, warnings, drug interactions, 
allergic reactions, or adverse effects. If you have questions about the drugs 
you are taking, check with your doctor, nurse or pharmacist.  
Copyright 5953-8002 [employer]. Version: 9.02. Revision date: 2019.  
Care instructions adapted under license by [employer]. If you have questions 
about a medical condition or this instruction, always ask your healthcare 
professional. [employer] disclaims any warranty or liability for 
your use of this information.  
  
  
ciprofloxacin (oral)  
Pronunciation: SIP jason FLOX a sin  
Brand: Cipro, Proquin XR  
What is the most important information I should know about ciprofloxacin?  
Ciprofloxacin can cause serious side effects, including tendon problems, nerve 
damage, serious moodor behavior changes, or low blood sugar.  
Stop using this medicine and call your doctor at once if you have: headache, 
hunger, irritability, numbness, tingling, burning pain, confusion, agitation, 
paranoia, problems with memory or concentration, thoughts of suicide, or sudden 
pain or movement problems in any of your joints.  
In rare cases, ciprofloxacin may cause damage to your aorta, which could lead to
dangerous bleedingor death. Get emergency medical help if you have severe and 
constant pain in your chest, stomach, or back.  
What is ciprofloxacin?  
Ciprofloxacin is a fluoroquinolone (nnan-x-DPUJ-o-lone) antibiotic, it is used 
to treat different types of bacterial infections. It is also used to treat 
people who have been exposed to anthrax or certain types of plague. 
Ciprofloxacin extended-release is only approved for use in adults.  
Fluoroquinolone antibiotics can cause serious or disabling side effects that may
not be reversible.Ciprofloxacin should be used only for infections that cannot 
be treated with a safer antibiotic.  
Ciprofloxacin may also be used for purposes not listed in this medication guide.  
What should I discuss with my healthcare provider before taking ciprofloxacin?  
You should not use ciprofloxacin if you are allergic to it, or if:  
you also take tizanidine; or  
you are allergic to other fluoroquinolones (levofloxacin, moxifloxacin, 
norfloxacin, ofloxacin).  
Ciprofloxacin may cause swelling or tearing of a tendon (the fiber that connects
bones to muscles in the body), especially in the Achilles' tendon of the heel. 
This can happen during treatment or several months after you stop taking 
ciprofloxacin. Tendon problems may be more likely in children and older adults, 
or people who use steroid medicine or have had an organ transplant.  
Tell your doctor if you have ever had:  
arthritis or problems with your tendons, bones or joints (especially in 
children);  
diabetes, low blood sugar;  
nerve problems;  
an aneurysm or blood circulation problems;  
heart problems, or a heart attack;  
muscle weakness, myasthenia gravis;  
liver or kidney disease;  
a seizure, head injury, or brain tumor;  
trouble swallowing pills;  
long QT syndrome (in you or a family member); or  
low levels of potassium in your blood (hypokalemia).  
Do not give this medicine to a child without medical advice.  
It is not known whether this medicine will harm an unborn baby. Tell your doctor
if you are pregnant.  
You should not breastfeed while taking ciprofloxacin and for 2 days after your 
last dose. Ask your doctor about breastfeeding if you take ciprofloxacin for 
anthrax exposure.  
How should I take ciprofloxacin?  
Follow all directions on your prescription label and read all medication guides 
or instruction sheets. Use the medicine exactly as directed.  
Take ciprofloxacin at the same time each day, with or without food.  
Shake the oral suspension (liquid) for 15 seconds before you measure a dose. Use
the dosing syringeprovided, or use a medicine dose-measuring device (not a 
kitchen spoon). Do not give ciprofloxacin oral suspension through a feeding 
tube.  
Swallow the extended-release tablet whole and do not crush, chew, or break it.  
Drink plenty of liquids while you are taking ciprofloxacin.  
Use this medicine for the full prescribed length of time, even if your symptoms 
quickly improve. Skipping doses can increase your risk of infection that is 
resistant to medication. Ciprofloxacin willnot treat a viral infection such as 
the flu or a common cold.  
Do not share ciprofloxacin with another person.  
Store at room temperature away from moisture and heat. Do not allow the liquid 
medicine to freeze. Throw away any unused liquid after 14 days.  
What happens if I miss a dose?  
If you take regular tablets or oral suspension: Take the medicine as soon as you
can, but skip the missed dose if your next dose is due in less than 6 hours.  
If you take extended-release tablets: Take the medicine as soon as you can, but 
skip the missed dose if your next dose is due in less than 8 hours.  
Do not take two doses at one time.  
What happens if I overdose?  
Seek emergency medical attention or call the Poison Help line at 1-179.227.8332.  
What should I avoid while taking ciprofloxacin?  
Do not take ciprofloxacin with dairy products such as milk or yogurt, or with 
calcium-fortified juice. You may eat or drink these products with your meals, 
but do not use them alone when taking ciprofloxacin.  
Antibiotic medicines can cause diarrhea, which may be a sign of a new infection.
If you have diarrhea that is watery or bloody, call your doctor before using 
anti-diarrhea medicine.  
Ciprofloxacin could make you sunburn more easily. Avoid sunlight or tanning 
beds. Wear protective clothing and use sunscreen (SPF 30 or higher) when you are
outdoors. Tell your doctor if you have severe burning, redness, itching, rash, 
or swelling after being in the sun.  
Avoid driving or hazardous activity until you know how this medicine will affect
you. Your reactions could be impaired.  
What are the possible side effects of ciprofloxacin?  
Get emergency medical help if you have signs of an allergic reaction (hives, 
difficult breathing, swelling in your face or throat) or a severe skin reaction 
(fever, sore throat, burning in your eyes,skin pain, red or purple skin rash 
that spreads and causes blistering and peeling).  
Ciprofloxacin can cause serious side effects, including tendon problems, damage 
to your nerves (which may be permanent), serious mood or behavior changes (after
just one dose), or low blood sugar (which can lead to coma).  
Stop taking this medicine and call your doctor at once if you have:  
low blood sugar --headache, hunger, irritability, dizziness, nausea, fast heart 
rate, or feeling shaky;  
nerve damage symptoms --numbness, tingling, burning pain in your hands, arms, 
legs, or feet:  
serious mood or behavior changes --nervousness, confusion, agitation, paranoia, 
hallucinations, memory problems, trouble concentrating, thoughts of suicide; or  
signs of tendon rupture --sudden pain, swelling, bruising, tenderness, 
stiffness, movement problems, or a snapping or popping sound in any of your 
joints (rest the joint until you receive medical care or instructions).  
In rare cases, ciprofloxacin may cause damage to your aorta, the main blood 
artery of the body. This could lead to dangerous bleeding or death. Get 
emergency medical help if you have severe and constant pain in your chest, 
stomach, or back.  
Also, stop using ciprofloxacin and call your doctor at once if you have:  
severe stomach pain, diarrhea that is watery or bloody;  
fast or pounding heartbeats, fluttering in your chest, shortness of breath, and 
sudden dizziness (like you might pass out);  
any skin rash, no matter how mild;  
muscle weakness, breathing problems;  
little or no urination;  
jaundice (yellowing of the skin or eyes); or  
increased pressure inside the skull --severe headaches, ringing in your ears, 
dizziness, nausea, vision problems, pain behind your eyes.  
Common side effects may include:  
nausea, vomiting, diarrhea, stomach pain;  
headache; or  
abnormal liver function tests.  
This is not a complete list of side effects and others may occur. Call your 
doctor for medical advice about side effects. You may report side effects to FDA
at 5-027-FDA-5730.  
What other drugs will affect ciprofloxacin?  
Some medicines can make ciprofloxacin much less effective when taken at the same
time. If you take any of the following medicines, take your ciprofloxacin dose 2
hours before or 6 hours after you take the other medicine.  
the ulcer medicine sucralfate, or antacids that contain calcium, magnesium, or 
aluminum (such as Maalox, Milk of Magnesia, Mylanta, Pepcid Complete, Rolaids, 
Tums, and others);  
didanosine (Videx) powder or chewable tablets;  
vitamin or mineral supplements that contain calcium, iron, magnesium, or zinc.  
Tell your doctor about all your other medicines, especially:  
clozapine, cyclosporine, methotrexate, phenytoin, probenecid, ropinirole, 
sildenafil, or theophylline;  
a blood thinner (warfarin, Coumadin, Jantoven);  
heart medication or a diuretic or  water pill ;  
oral diabetes medicine;  
products that contain caffeine;  
medicine to treat depression or mental illness;  
steroid medicine (such as prednisone); o  
NSAIDs (nonsteroidal anti-inflammatory drugs) --aspirin, ibuprofen (Advil, 
Motrin), naproxen (Aleve), celecoxib, diclofenac, indomethacin, meloxicam, and 
others;  
This list is not complete. Other drugs may affect ciprofloxacin, including 
prescription and over-the-counter medicines, vitamins, and herbal products. Not 
all possible drug interactions are listed here.  
Where can I get more information?  
Your pharmacist can provide more information about ciprofloxacin.  
Remember, keep this and all other medicines out of the reach of children, never 
share your medicines with others, and use this medication only for the 
indication prescribed.  
Every effort has been made to ensure that the information provided by [employer] is accurate, up-to-date, and complete, but no guarantee 
is made to that effect. Drug information contained herein may be time sensitive.
[employer] information has been compiled for use by healthcare practitioners and 
consumers in the [place] and therefore [employer] does not warrant that uses 
outside of the [place] are appropriate, unless specifically indicated 
otherwise. [employer] drug information does not endorse drugs, diagnose patients 
or recommend therapy. [employer] drug information isan informational resource 
designed to assist licensed healthcare practitioners in caring for their p
atients and/or to serve consumers viewing this service as a supplement to, and 
not a substitute for, the expertise, skill, knowledge and judgment of healthcare
practitioners. The absence of a warningfor a given drug or drug combination in 
no way should be construed to indicate that the drug or drug combination is 
safe, effective or appropriate for any given patient. [employer] does not assume any
responsibility for any aspect of healthcare administered with the aid of 
information [employer] provides. The information contained herein is not intended to
cover all possible uses, directions, precautions, warnings, drug interactions, 
allergic reactions, or adverse effects. If you have questions about the drugs 
you are taking, check with your doctor, nurse or pharmacist.  
Copyright 6307-7317 [employer] Version: 23.01. Revision date: 
7/15/2020.  
Care instructions adapted under license by [employer]. If you have questions 
about a medical condition or this instruction, always ask your healthcare 
professional. [employer] disclaims any warranty or liability for 
your use of this information.  
  
  
  
  
Electronically signed by Cee Arthur RN at 2021 1:17 PM EST  
  
  
  
  
* Discharge Instr - Activity*   
  
Cee Arthur RN - 2021 1:18 PM EST  
  
  
  
Formatting of this note might be different from the original.  
No driving or heavy lifting until follow-up appointment with Dr. Tirado.  
  
  
  
Electronically signed by Cee Arthur RN at 2021 1:18 PM EST  
  
  
  
  
* Discharge Instr - Diet*   
  
Cee Arthur RN - 2021 1:18 PM EST  
  
  
  
Formatting of this note might be different from the original.  
Good nutrition is important when healing from an illness, injury, or surgery. 
Follow any nutrition recommendations given to you during your hospital stay.  
If you were given an oral nutrition supplement while in the hospital, continue 
to take this supplement at home. You can take it with meals, in-between meals, 
and/or before bedtime. These supplements can be purchased at most local grocery 
stores, pharmacies, and [employer].  
If you have any questions about your diet or nutrition, call the hospital and 
ask for the dietitian.  
  
  
  
  
Electronically signed by Cee Arthur RN at 2021 1:18 PM EST  
  
  
  
  
* Discharge Instr - DAVID*   
  
Cee Arthur RN - 2021 1:18 PM EST  
  
  
  
Formatting of this note is different from the original.  
Continuity of Care Form  
  
Patient Name: Payton [place]  
: 1950 MRN: [id number]  
  
Admit date: 2021 Discharge date: ***  
  
Code Status Order: Full Code  
Advance Directives:  
Advance Care Flowsheet Documentation  
  
Date/Time Healthcare Directive Type of Healthcare Directive Copy in Chart 
Healthcare Agent Appointed Healthcare Agent's Name Healthcare Agent's Phone 
Number  
[phone] No, patient does not have an advance directive for healthcare 
treatment  
  
  
  
Admitting Physician: Marquise Tirado DO  
PCP: CEE HAND  
  
Discharging Nurse: ***  
Discharging Hospital Unit/Room#: 331/331-01  
Discharging Unit Phone Number: ***  
  
Emergency Contact:  
Extended Emergency Contact Information  
Primary Emergency Contact: Toya Carson  
Home Phone: [phone]  
Mobile Phone: [phone]  
Relation: Child  
Secondary Emergency Contact: Cee Parrish  
Home Phone: [phone]  
Mobile Phone: [phone]  
Relation: Child  
  
Past Surgical History:  
Past Surgical History:  
Procedure Laterality Date  
COLONOSCOPY  
COLPOPEXY 2021  
SACRAL COLPOPEXY ROBOTIC WITH RESTORELLE MESH  
COLPOPEXY N/A 2021  
SACRAL COLPOPEXY ROBOTIC WITH RESTORELLE MESH performed by Marquise Tirado DO at
[facility]  
CYSTOSCOPY 2021  
CYSTOSCOPY N/A 2021  
CYSTOSCOPY performed by Marquise Tirado DO at [facility]  
FEMUR SURGERY Right 1972  
matthew inserted  
FEMUR SURGERY Right 1974  
matthew removed  
HYSTERECTOMY 1979  
NOSE SURGERY 1965  
SKIN BIOPSY Left  
nostril  
VAGINA SURGERY N/A 2021  
VAGINAL REPAIR POSTERIOR performed by Marquise Tirado DO at [facility] OR  
WRIST SURGERY Left 2019  
  
Immunization History:  
  
There is no immunization history on file for this patient.  
  
Active Problems:  
Patient Active Problem List  
Diagnosis Code  
S/P sacrocolpopexy Z98.890  
  
Isolation/Infection:  
Isolation  
  
  
No Isolation  
  
  
  
Patient Infection Status  
  
None to display  
  
  
  
Nurse Assessment:  
Last Vital Signs: BP (!) 98/56   Pulse 61   Temp 97.3 F (36.3 C) (Oral)   Resp 
16   Ht 5' 4  (1.626m)   Wt 114 lb 13.8 oz (52.1 kg)   SpO2 95%   BMI 19.72 kg/m  
Last documented pain score (0-10 scale): Pain Level: 7  
Last Weight:  
Wt Readings from Last 1 Encounters:  
21 114 lb 13.8 oz (52.1 kg)  
  
Mental Status: {IP PT MENTAL STATUS:}  
  
IV Access:  
{ DAVID IV ACCESS:784018839}  
  
Nursing Mobility/ADLs:  
Walking {CHP DME ADLs:542807319}  
Transfer {CHP DME ADLs:783215091}  
Bathing {CHP DME ADLs:355587854}  
Dressing {CHP DME ADLs:082079501}  
Toileting {CHP DME ADLs:036360395}  
Feeding {CHP DME ADLs:050481419}  
Med Admin {CHP DME ADLs:008273211}  
Med Delivery { DAVID MED Delivery:535744426}  
  
Wound Care Documentation and Therapy:  
  
  
Elimination:  
Continence:  
Bowel: {YES / NO:}  
Bladder: {YES / NO:}  
Urinary Catheter: {Urinary Catheter:700409838}  
Colostomy/Ileostomy/Ileal Conduit: {YES / NO:}  
  
  
Date of Last BM: ***  
  
Intake/Output Summary (Last 24 hours) at 2021 1318  
Last data filed at 2021 0446  
Gross per 24 hour  
Intake 2540 ml  
Output 1925 ml  
Net 615 ml  
  
I/O last 3 completed shifts:  
In: 2540 [P.O.:240; I.V.:2300]  
Out:  [Urine:1825; Blood:100]  
  
Safety Concerns:  
{ DAVID Safety Concerns:243740875}  
  
Impairments/Disabilities:  
{ DAVID Impairments/Disabilities:493108119}  
  
Nutrition Therapy:  
Current Nutrition Therapy:  
{ DAVID Diet List:119153323}  
  
Routes of Feeding: {[facility] DME Other Feedings:310063603}  
Liquids: {Slp liquid thickness:65022}  
Daily Fluid Restriction: {P DME Yes amt example:498531953}  
Last Modified Barium Swallow with Video (Video Swallowing Test): {Done Not Done 
Date:}  
  
Treatments at the Time of Hospital Discharge:  
Respiratory Treatments: ***  
Oxygen Therapy: {Therapy; copd oxygen:79484}  
Ventilator:  
{ CC Vent List:217150319}  
  
Rehab Therapies: {THERAPEUTIC INTERVENTION:6998468710}  
Weight Bearing Status/Restrictions: {[facility] Weight Bearin}  
Other Medical Equipment (for information only, NOT a DME order): 
{EQUIPMENT:660146549}  
Other Treatments: ***  
  
Patient's personal belongings (please select all that are sent with patient):  
{[facility] DME Belongings:307848005}  
  
RN SIGNATURE: {Esignature:908873111}  
  
CASE MANAGEMENT/SOCIAL WORK SECTION  
  
Inpatient Status Date: ***  
  
Readmission Risk Assessment Score:  
Readmission Risk  
  
Risk of Unplanned Readmission: 0  
  
  
  
Discharging to Facility/ Agency  
Name:  
Address:  
Phone:  
Fax:  
  
Dialysis Facility (if applicable)  
Name:  
Address:  
Dialysis Schedule:  
Phone:  
Fax:  
  
/ signature: {Esignature:596414296}  
  
PHYSICIAN SECTION  
  
Prognosis: {Prognosis:9556721829}  
  
Condition at Discharge: { Patient Condition:622280405}  
  
Rehab Potential (if transferring to Rehab): {Prognosis:0940967964}  
  
Recommended Labs or Other Treatments After Discharge: ***  
  
Physician Certification: I certify the above information and transfer of Payton Bravo is necessary for the continuing treatment of the diagnosis listed and
that she requires {Admit to Appropriate Level of Care:72239} for 
{GREATER/LESS:196223932} 30 days.  
  
Update Admission H&P: {P DME Changes in HandP:502090649}  
  
PHYSICIAN SIGNATURE: {Esignature:877723537}  
  
  
  
Electronically signed by Cee Arthur RN at 2021 1:18 PM EST  
  
  
  
  
* Additional Instructions*   
  
NikitaMelissa goddard, DO - 2021  
  
  
  
Formatting of this note is different from the original.  
Images from the original note were not included.  
  
  
POSTOPERATIVE PATIENT INSTRUCTIONS  
MAJOR & MINOR SURGICAL PROCEDURES  
  
Congratulations! You have taken the brave step of undergoing a surgery in order 
to try to improve your health. Now it is time to focus on healing outside of the
hospital / surgery center setting. To make your dismissal as successful as 
possible, it is recommended that you thoroughly review these postoperative 
instructions. These instructions pertain to, but are not limited to the 
following procedures:  
  
MAJOR  
; Hysterectomy Vaginal / Laparoscopic / Robotic  
; With or Without tube-ovarian Removal (Salpingo-oophorectomy)  
; Sacrocolpopexy / Sacroperineopexy / Sacrocervicopexy (Lifting the vagina / 
cervix to the sacrum)  
; Major Vaginal Prolapse repairs  
; Cystocele repair (Anterior Colporrhaphy)  
; Rectocele repair (Posterior Colporrhaphy)  
; Enterocele repair  
; Vaginal vault repair  
; Closing or removal of the vagina (Colpocleisis / Colpectomy)  
; Major urinary incontinence surgery (Cruz Urethropexy, MMK)  
; Major fibroid removal (Myomectomy)  
; Major tubal surgery (re-anastomosis, ectopic pregnancy, pelvic inflammatory 
disease)  
; Major surgery for adhesions or endometriosis involving bowel  
; Major vaginal mesh removal  
; Fistula repair of the bladder or colorectum to the vagina  
; Creation of a new vagina (Neovaginoplasty) or repair of a Mullerian Anomaly  
; Other  
  
MINOR  
; Hysteroscopy with Dilatation / Curettage, Endometrial Ablation  
; Laparoscopy With or Without minor tubal or ovarian surgery  
; Minor Vaginal Prolapse repairs  
; Cystocele repair (Anterior Colporrhaphy)  
; Rectocele repair (Posterior Colporrhaphy)  
; Urethrocele repair  
; Minor urinary incontinence surgery (Slings, Transurethral Bulking)  
; Minor vaginal surgery (Mesh removal, Laser ablation, Biopsies, Labial 
revisions, Injections)  
; Minor surgery of the bladder (DMSO, Hydrodistention, Botox, etc.)  
; Other  
  
1  
  
POST OPERATIVE BASIC INSTRUCTIONS  
The office will call on the Friday, or within 1 week, after your surgery to make
sure you are doingwell and to answer questions.  
1. Your 1st post-op visit will occur within 1-2 weeks after your surgery.  
2. Your final post-op visit will occur @ 4-6 weeks depending on the surgery & 
your desire to return to work.  
3. Your surgery and recovery may require more visits in between the 1st and 
final visits.  
  
DO S, DONT S, & WHEN TO CALL  
As reviewed with you on the morning of your discharge, for the 1st week out of 
surgery below is a QUICK list of DO s, DONT s, and WHEN TO CALL:  
  
5 THINGS YOU MAY DO 5 THINGS YOU MAY NOT DO FOR 4-6 WEEKS  
-Shower with Ivory Soap and water -Tub bathe, hot tub, or swim  
-Gradually increase walking -Heavy lifting > 15lbs (2 gallons worth)  
-Go up and down stairs slowly -Insert anything into the vagina (sex, douching, 
tampons) -Drive a car / motorcycle (*See  
-Light housecleaning (dusting, dishwashing) Special Considerations)  
-Short local travel (restaurant, [employer]) -Long distance travel > 1.5 hours (*See
Special Considerations)  
  
5 SYMPTOMS TO CALL FOR:  
-Sustained fever >100.4 despite Tylenol or a cold shower, especially associated 
with redness of incision/s  
-Pain out of the ordinary (>7) despite pain meds / anti-inflammatories  
-Heavy vaginal bleeding > 1 pad / hour with large red clots  
-Inability to eliminate urine (especially if catheterized) or flatus / stool 
(with sustained distention & nausea)  
-Calf pain or extreme shortness of breath  
  
QUESTIONS, CONCERNS, EMERGENCIES  
* Please call the office with any questions or concerns at [phone].  
  
* If during office hours, your issue may require an appointment. If after hours,
the answering service will connect you with the physician On Call.  
  
* If you are concerned that your issue may be emergent, PLEASE CALL FIRST.  
; Many issues may be resolved over the phone and avoid an unnecessary and 
expensive ER visit.  
; If you truly have an emergency related to the surgery and call first:  
*You will be directed to the hospital ER in which you had your surgery.  
[facility] primarily or [facility] rarely.  
[facility] patients may be asked to report to [facility] if 
Dr. Tirado s on-call partner is assuming responsibility.  
; Calling first helps to expedite your care and avoids an unnecessary and 
expensive ambulance transfer to [facility]. Dial 911 or go to your 
closest ER if your emergency is related to a potential heart attack or stroke.  
2  
  
DISCHARGE MEDICINES  
  
* Norco 325/5mg tablets or alternative narcotic. Take 1-2 tablets by mouth every
4-6 hours as needed for pain.  
- Per [facility] Board of Pharmacy laws, only 1 week of narcotics may be 
prescribed at a time.  
- Do not take extra Tylenol (Acetaminophen) orally as Norco already contains the
medicine.  
- May take 500mg orally every 4-6 hours if off of Norco.  
* Ibuprofen 400-800mg is safe to take. Take 1 tablet by mouth every 8 hours for 
inflammation.  
  
* Senokot S. Take two tablets by mouth every night to prevent constipation. Stop
if loose stools occur.  
* If an antibiotic is required: Keflex 250-500mg take 1 tablet by mouth 3x / day
as prescribed  
OR Cipro 250-500mg take 1 tablet by mouth 2x / day as prescribed  
  
* Other medicines or antibiotics may be prescribed based on your postoperative 
course or situation.  
*You may resume taking any medications you were taking before your surgery, 
unless told otherwise.  
  
**DUE TO BLEEDING RISK, DO NOT RESUME HOME ANTICOAGULANTS UNLESS DISCUSSED**  
  
SPECIAL CONSIDERATIONS  
After surgery, give yourself a chance to adjust and recover. Some patients feel 
fine within a month. Many need a little extra time. Do not be concerned if you 
feel fatigued for the first month, your body is recovering. It may take several 
weeks for you to get your energy back. Even if energy has returned, please do 
not be tempted to re-engage in strenuous activity. Although mild weight gain is 
not uncommon, most of it is IV fluid weight that your body will remove with 
time. You have the rest ofyour life to exercise, so some patience and rest is 
wise in the short term in order to heal successfully long term. Once you have 
fully recovered, you may focus on enjoying your life. Keep in mind, you will 
continue to heal for 6-12 months after surgery, so use common sense to protect 
your surgery (avoid repetitive heavy lifting, constipation, chronic pelvic 
strain.)  
  
In particular, if you had a hysterectomy, you may have both physical and 
emotional effects that maybe brief or long term. After hysterectomy, periods 
will stop and a woman can no longer achieve pregnancy. Despite popular myth, 
post-hysterectomy weight gain is not due to the hysterectomy, but is usually a 
result of other factors. A depressive emotional reaction to loss of the uterus 
is not uncommon or abnormal. Please discuss any concerns with your health care 
provider if persistent. Sexual response may change after hysterectomy. There are
no definitive studies showing decreased orgasmic potential post-hysterectomy. 
Some women have a heightened response due to correcting painful pathology. O
varian removal may decrease estrogenization, leading to vaginal dryness and 
menopausal hot flashes.Hormonal therapy may need to be discussed.  
**SOME SURGERIES HAVE SPECIAL CONSIDERATIONS**  
MAJOR  
* For all major surgeries listed above, you may drive after your 1week post-op 
visit if cleared, have discontinued narcotic pain meds, and are able to depress 
the brake quickly without pain. Long distance travel may be resumed in 4 weeks 
if stable.  
* With major robotic hysterectomy, you should refrain from intercourse for 8 
weeks, other hysterectomies 6 weeks.  
MINOR  
* Minor surgeries may drive next day any distance if off narcotic pain meds and 
are able to brake safely.  
* For minor vaginal surgeries for prolapse and / or urinary incontinence 
procedures, maintain pelvic rest for 4 weeks. Exercise and work may be resumed 
within 2-4 weeks depending on healing.  
* For minor surgeries of the vagina, uterus, and bladder, hysteroscopy, D&C, and
laparoscopy, maintain pelvic rest for 1-2 weeks depending on healing. Exercise 
and work may be resumed within the week.  
  
3  
  
CONSENT ITEMS REVISITED IN THE POST OPERATIVE PERIOD  
  
1. Physical and sexual activity will be restricted in varying degrees for an 
indeterminate period of time, but most often 2-8 weeks depending on the breadth 
of surgery. It is impossible to list everyundesirable effect and that the 
condition for which surgery is done is not always cured or significantly 
improved, and in rare cases may even worsen.  
2. There is no 100% guarantee that the planned surgery, despite everything being
done correctly andto the medico-surgical standard with resolve a condition 100% 
including but not limited to pain, urinary infections, bladder function, or 
defecatory dysfunction. Specifically, up to 20% of patients with abdominopelvic 
pain, 27% of those with UTI's, 16-26% with urinary incontinence or voiding 
dysfunction, and 40% with defecatory dysfunction may not see substantial long 
lasting improvement from a surgical intervention.  
3. Dangerous blood clots in the legs or lungs may occur post-operatively. 
Patients on chronic bloodthinners, particularly those without antidote, may be 
at significant risk of bleeding, hemorrhage, hematoma formation, need for 
transfusion, and death over the regular population. Life-threatening bleeding 
complications may even occur up to 4 weeks out of surgery even if 
anticoagulation is managed appropriately. If the patient has been taken off 
anticoagulation because of bleeding, this could expose her to life-threatening 
blood clots in the legs or lungs, MI, or stroke.  
4. Elderly patients over the age of 70 may experience up to a 2% mortality rate 
in the postsurgicalperiod related to comorbidities and declining health. A 
living will and is recommended to be reviewed.  
5. For major outpatient procedures requiring less than a 24 hour stay, you will 
be dismissed from the hospital or surgery setting when stable and meets criteria
for discharge. Less than 5% of patients may rebound to an emergency setting for 
some of the risks mentioned above even though they have met criteria for 
dismissal earlier. Outpatient surgical recovery usually occurs between 2 and 4 
weeks.For major inpatient procedures requiring an average 1-3 day hospital stay,
and the patient may not be fully recovered from major surgery for up to 6-8 
weeks. Please understand with Medicare and insurance reform, only 1 night will 
be approved for most reconstructive or robotic procedures.  
  
In regards to reconstructive pelvic and incontinence surgery:  
1. Approximately 85% of patients experience a reasonable improvement >5 years in
pelvic support and urinary/fecal incontinence, as well as urinary infections, 
after the procedure. There is a long-term risk of recurrent prolapse in up to 
30% of women who have undergone reconstructive surgery if healthy lifestyle 
behaviors are not maintained. This includes but is not limited to smoking 
cessation, weight loss in the obese, reduction in heavy lifting, exercise, fall 
prevention, and bowel regularity. Reconstructive pelvic and/or urinary/fecal 
incontinence surgeries may only improve your condition/s mildly and may not 
completely resolve problems including but not limited to urinary tract infect
ions and/or defecatory dysfunction. 17% of patients may still get a urinary 
tract infection and 40-60% of patients may still experience constipation 
postoperatively.  
2. It may be hard to urinate for a few days or weeks. Sometimes, up to 40% of 
women cannot urinate efficiently after surgery due to swelling, anesthesia, or 
pain. Prolonged urinary retention may rarely occur after an incontinence or 
pelvic prolapse surgery and is more likely if retention predates surgery or 
includes factors that are not limited to neuropathy, diabetes mellitus, or prior
pelvic surgery. The patient may need a catheter to drain the bladder for 1-2 
weeks on average. Patients in Dr. Tirado's practice most often prefer a 
transurethral Ruff. Other choices are a suprapubic catheter or intermittent 
self-catheterization. The patient has been given instructions on how to manage 
the type of catheter chosen, which will be reiterated postoperatively. Despite 
evidence indicating no need for antibiotics with a catheter, real world 
experience shows a 20-40% rate of UTI with a Ruff catheter which depending on 
personal risk factors and extent of surgery, may require a prophylactic anti
biotic afterwards. Antibiotics have risks of resistance, diarrhea and C. 
difficile infection. Suprapubic catheters carry a risk of urinoma, bowel injury,
and bleeding and have a UTI risk of 15-20%. Intermittent self-catheterization 
carries an 11% risk of a UTI.  
  
In regards to labial or perineal reconstructive surgery:  
1. You should expect some bruising and mild swelling with related discomfort 
following these surgeries that lasts 1-2 weeks. Ice packs and sitz baths may be 
utilized after surgery to help minimize swelling and discomfort. Mild analgesics
are also used. Final optimal results can usually be appreciated in several 
months. Most patients may return to work or school within a week after surgery; 
however, strenuous activity or tight clothing is discouraged and patients must 
refrain from sexual intercourse for 4-6 weeks after surgery.  
2. You may receive a topical antibiotic, which reduces risk of infection. Mild 
bleeding is not uncommon and can occur postoperatively if strenuous activity or 
intercourse is begun too early. Problemswith healing, such as incision 
separation, suture popping, scarring, and/or pain following the surgery are rare
but can happen.  
  
4  
SPECIAL INSTRUCTIONS  
  
IF MRSA POSITIVE  
* Continue Bactroban to the nares 2x / day for 2 weeks post-operatively. Since 
many people are colonized in the community, it is not something we will 
routinely culture for post-operatively.  
  
TO PREVENT BLOOD CLOTS IN THE LEGS OR LUNGS / PNEUMONIA IN THE LUNGS  
* Regular walking or calf stretches and wearing a supportive hose may help 
prevent clots in the legs or lungs.  
* If interested, home pneumatic cuffs are available for purchase through the 
office if you wish to continue these at home.  
* Take home your incentive spirometer and utilize it as instructed to prevent 
pneumonia. * Smoking cessation before and after surgery is strongly encouraged.  
VAGINAL BLEEDING & DISCHARGE  
* You will likely experience light bleeding with the potential for small dime 
size clots, lasting 2-3 weeks after your surgery. This should not be construed 
as heavy bleeding.  
* You may notice an increase in vaginal discharge 4-6 weeks after your surgery, 
which may be watery, yellowish, or pinkish. It may have more of an acidic odor. 
This is common as the vagina flushes out edematous body fluid and dissolves the 
absorbable sutures.  
* As the healing process progresses, you may experience mild itching within the 
vagina, as well as outside the vaginal opening. If this itching become severe, 
and/or is accompanied by a foul smell and swelling, please call the office.  
DRAINS & WOUND CARE  
* If a drain or specific wound care appliance is present at the time of 
dismissal, please follow additional instructions that may be provided regarding 
maintenance of the device/s.  
* Basic daily maintenance of a drain is as follows:  
- You may wash around the drain site with warm, soapy water and pat dry with a 
towel.  
- If indicated, use a topical antibiotic around the drain site 2x / day.  
- Strip or milk the drain from the drain site to the bulb suction 3x / day. This
clears any blockage from the drain.  
Simply pinch the drain at its insertion site into your body between your thumb 
and forefinger. Thensqueeze and pull the drainage tubing as you move towards the
drain bulb, allowing the tube to slidebetween your fingers with mild resistance,
you should see a suction effect within the drain tube that moves fluid toward 
the bulb.  
* Please call the office immediately if the drain falls out or cannot maintain 
suction.  
* Please call if redness of the drain site increases and is associated with 
fever, pain, or purulent discharge.  
CONSTIPATION  
* Patients are often constipated after a prolonged period of bed rest, or with 
the use of oral narcotic pain medications. If you have not had a bowel movement 
for 3 days after you are dismissed from the hospital, or are uncomfortable and 
unable to pass stool, please try one or all of the following measures in a 
stepwise manner:  
  
1. Eat fruits, vegetables, prunes & whole-grain foods. Drink 8 glasses of fluid 
daily. Add PlumSmart juice.  
2. Metamucil, FiberCon, or other bulking medication - use as directed  
3. Milk of magnesia - 30 mL s by mouth every 12 hours  
4. Dulcolax suppository - 1 suppository per rectum every 4-6 hours  
5. Fleets enema use as directed unless told nothing per rectum (colorectal 
fistula repair)  
  
BLADDER DRAINAGE  
There is a >70% chance you will void on your own post-operatively. In particular
to reconstructive and incontinence surgeries (whether you had incontinence 
before surgery or not), sometimes surgical effects of normal swelling and new 
positioning of the bladder may be associated with some mild tomoderate 
postoperative urinary leakage. Often this leakage is urge related. As discussed 
pre-operatively, up to 26% of patients with prolapse and negative urodynamic 
testing may develop de elvie incontinence afterwards. Please do not be frustrated
if temporary incontinence occurs for it will most likely improve as you continue
to heal. Leakage rarely persists long-term, there are many options for t
reatment, and Dr. Tirado and his office staff are there to help you every step of
the way.  
5  
[facility]  
  
The patient, ___________________________ (name), has been discharged from the 
hospital / surgery center on _________________________ (date) from her 
operation. The patient has been given the appropriate postoperative instructions
and they have been reviewed thoroughly with the patient and her family as 
necessary.  
The patient voices understanding and all questions have been answered to her and
/ or her family s satisfaction.  
  
Regarding her voiding ability at the time of discharge from the hospital:  
  
[ ]  
Patient was able to void on her own. (*RN to remove pages 7-16*)  
[ ]  
The patient was discharged with an indwelling Ruff catheter and will do 
clamping trials at home. She will need a voiding trial at the office in 1 week. 
(*RN to include pages 7-10,15,16 & remove pages 11-14*)  
[ ] The patient was discharged with an indwelling Ruff catheter and bladder 
rest. She will not undergo a voiding trial unless directed by Dr. Tirado at the 
first post-operative visit.  
  
(*RN to include pages 7-10,15,16 & remove pages 11-14*)  
[ ]  
Patient was taught intermittent self-catheterization.  
(*RN to include pages 7,8,11,15,16 & remove pages 9,10,12-14*)  
[ ]  
  
  
The patient was discharged with a suprapubic catheter. (*RN to include pages 
7-10,12-16 & remove page 11*)  
__________________________________________________ ____________  
RN Signature Date  
  
  
_________________________________________________ ____________  
Patient or Family Member Signature Date  
  
FAX COMPLETED FORM TO:  
Marquise Tirado DO  
FAX: [phone]  
  
(Patient Sticker Here) 6  
  
BLADDER DRAINAGE FOR THOSE PATIENTS REQUIRING A CATHETER  
  
Conversely, it may be hard for you to urinate for a few days or weeks. 
Sometimes, up to 30-40% of women cannot urinate efficiently after surgery due to
swelling or anesthesia. This may last a few hours to a few weeks.  
  
You may be required to use a catheter in your bladder to help it drain and 
retrain it to work properly. One kind of catheter, called a Transurethral Ruff 
Catheter, may be placed into the bladder through the urethra. This is usually 
reserved for large pelvic reconstructive surgeries and timely recovery. Some 
patients prefer to use a catheter intermittently to empty their bladder. This is
called Intermittent Self-Catheterization (ISC). It is an easy technique to learn
and helps to lessen the threat of urine infection. A third kind of catheter is 
called a Suprapubic Catheter (SPC). This catheter is placed through a small 
incision in the abdomen. A SPC is good for elderly patients or those whoare 
obese and may not be able to insert a catheter, or for complex bladder surgery 
requiring delayed recovery. Other tubes may help drain fluid from your 
incision/s.  
  
  
  
Unless instructed to maintain a catheter to drainage or to rest, you will begin 
plugging the end ofyour catheter with a small plastic plug in the hospital, and 
you will leave it plugged for set intervals of time. You will continue this upon
dismissal. While your catheter is plugged, the urine willnot drain out of it, 
and your bladder will fill like it always has naturally. You will be sent home
with a smaller day bag that straps to your leg for mobility plus a larger night 
bag to allow you torest all night without the need to drain the bag, and 
cleansing supplies.  
  
* If you have a Transurethral Ruff Catheter, plugging of the catheter helps to 
wake up the bladderand increases capacity. By the time you come in for your 1st 
postoperative visit, 95% of patients will be ready to pass a voiding trial and 
have the catheter removed successfully. During daytime, tryplugging until you 
are comfortably full, then drain your bladder through the catheter. Your goal 
should be 2-4 hours of plugging between bladder emptying. You may put your 
catheter to drainage at night.  
  
* If you are performing Intermittent Self-Catheterization, you should attempt to
urinate every houror so and then catheterize at the end of the time interval to 
measure how much urine is left in thebladder by emptying the catheter into the 
measuring hat.  
  
* If you have a Suprapubic Catheter, during the time that your catheter is 
plugged, you should attempt to urinate naturally every hour or so. At the end of
the time interval, measure how much urine is left in the bladder by emptying the
catheter into the measuring hat.  
  
  
  
7  
  
You will not begin progressing through the different intervals of the suprapubic
plugging or catheterization routine until you are able to urinate normally 
through your urethra. Until this happens, you will repeat the 4 hour interval 
over and over. When you have started urinating normally, you willmeasure how 
much is left in your bladder at the end of each interval and make a decision 
about whether you may progress to the next time interval. Please see the 
respective catheterization routine pertinent to the type of catheter that you 
may have.  
  
** Until the 12 hour interval is reached, the catheter should be hooked to the 
drainage bag every night to drain.  
** After the 1st successful 24-hour interval, please call the office to update 
one of the nurses.  
  
Troubleshooting the Catheter  
It is not uncommon for the transurethral or suprapubic catheter to leak around 
the urethra or skin incision respectively as the bladder gets too full. If you 
have problems with this type of leaking, drain your bladder through the 
catheter. If leakage continues, reconnect your catheter to the Ruff bag until 
the next morning, then restart the plugging routine. Please call the office with
continuedleakage problems.  
  
Removing the Catheter  
The catheter is held in place inside your bladder by a water-filled balloon. 
Cutting the collar (next to the end of the part you ve been plugging) will cause
the water inside the balloon to empty out, and the balloon will deflate. You may
then remove the catheter by pulling on it gently.  
  
Things to Remember  
* You do not need to measure how much you urinate, ever. You need only to 
measure how much is left in your bladder (which gets drained from the catheter) 
at the end of each plugging interval. This isknown as the residual urine. You do
not have to measure this residual every time you urinate normally; only at the 
end of the plugging interval.  
* You may find it easier to use the Ruff bag at night to collect your urine. 
Doing this prevents you from having to get up in the night to drain your 
bladder. If you use the Ruff bag, simply restart your most recent plugging 
interval when you wake up (for example, if you were at the 6 hour interval 
before bed, start at this interval routine in the morning.)  
* The site where a catheter is inserted into the urethra or abdomen may become 
pinkish, purplish, or develop a pus-like or crusty substance around it. These 
are your body s reactions to the catheter s presence, and are very normal. You 
may wash around the catheter daily with soap and water. Rinse and dry these 
areas well. You may shower while wearing the catheter. Maintain good personal 
hygiene. If this site develops a redness that spreads, or is warm and painful to
the touch, please call the office immediately.  
* When changing from one bag to another, using a clean leg bag or Ruff bag and 
an alcohol prep. Wash your hands thoroughly with soap and water and swab the end
of the drainage tubing that will be attached to the Ruff catheter. Disconnect 
the drainage bag from the Ruff catheter and put the bag aside. Attach a clean 
bag to the catheter. Wash your hands thoroughly afterwards.  
* To clean the bags simply empty the urine from the bag and leave the spout open
for cleaning. Mix a cup of vinegar and cup of cool water and flush the bag using
a 50 mL syringe, or by submerging thebag under the mixture. Once the bag is 
filled, close the spout and allow the liquid to stay in the bag for 30 minutes. 
Drain the cleaning solution and rinse the bag again with tap water. Hang dry 
with the cap off and the spout open. A commercially prepared urinary appliance 
 may also be usedas instructed.  
* It is important that when it is time to remove the catheter, do so in the 
morning, because you will need to urinate every 30-60 minutes on the day that 
you remove it. This is to keep the bladder empty and compressed, and to allow 
itself to heal. This is especially important for the suprapubic incision for 
those with suprapubic catheters.  
After removing the suprapubic catheter in particular, cover the site with a 
Band-Aid for the 1st 24-48 hours.  
* Drink at least 2 quarts of liquid a day. Avoid caffeinated drinks as they may 
irritate the bladder and cause bladder spasms.  
* Please remember to call the office once a week until the catheter is removed 
with an update on your progress.  
  
  
8  
HOW TO CARE FOR YOUR RUFF CATHETER FEMALE  
  
About this Topic: Ruff catheter is a thin, flexible tube that drains urine from
your bladder. The catheter connects to a special bag. The bag holds the urine 
until you are able to empty the bag. Youmay need to have a catheter for a short 
time. You may need a catheter after you are sick or have had surgery. Sometimes 
a catheter is used for a long time.  
  
What Will the Results Be: Your urine will drain and you will prevent infection.  
  
  
  
What Care is Needed at Home?  
; Ask your doctor what you need to do when you go home. Make sure you ask 
questions if you do not understand what the doctor says. This way you will know 
what you need to do.  
; Your doctor may order a home health nurse to come to your house to help you 
learn to care for your catheter.  
; Prevent infections:  
; Wash your hands before and after handling your catheter.  
; If you switch between a leg bag and an overnight drainage bag, be sure you 
clean the connection between the catheter and the bag before you switch bags. 
Ask your doctor what to use to clean the connection.  
; Place a cap on the drainage bag you are not using and store in a clean towel.  
; Rinse the empty drainage bag not in use with 1 cup vinegar mixed with 1 cup 
water.  
; Care for the tube:  
; Wash the skin around the catheter with soap and water each day. Pat the skin 
dry.  
; Do not put anything on the tube.  
; Keep the tube secure. Do not let the tube pull or catch when you are moving 
around during the day.  
; Do not let the tube kink or loop.  
; Care for the Drainage Bag:  
; Keep your urine bag below your bladder.  
; Drain the bag often to help keep you from getting an infection.  
; Wear cotton underwear.  
; What Follow-Up Care Is Needed? Your doctor may ask you to make visits to the 
office to check on your progress. Be sure to keep these visits.  
;  
;  
  
;  
;  
9  
  
; What Lifestyle Changes are Needed?  
; Drink 6-8 glasses of water every day.  
; Take showers rather than soaking in a bath.  
  
; Will Physical Activity Be Limited?  
; Talk to your doctor about what you can and cannot do when the catheter is in 
place.  
  
; What Problems Could Happen?  
; The catheter has a balloon to hold it inside the bladder. The balloon can 
break or leak and the catheter can fall out.  
; Urine flow stops or is blocked by kinks or bends in the tube or if the drain 
bag is kept higher than your bladder.  
; You may see blood in the collecting tube or bag.  
; Bladder infection.  
  
; When Do I Need To Call the Doctor?  
; Signs of infection like a fever of 100.4F (38C) or higher, chills, pain around
the catheter, redness or swelling of the skin around the catheter.  
; Urine has blood and it is dark or coffee colored, or is pus-like.  
; Tube comes out or urine stops flowing.  
; Burning or painful feeling in your bladder.  
; You are not feeling better in 2-3 days or you are feeling worse.  
  
; Teach Back: Helping You Understand  
; The Teach Back Method helps you understand the information we are giving you. 
The idea is simple.After talking with the staff, tell them in your own words 
what you were just told. This helps to make sure the staff has covered each 
thing clearly. It also helps to explain things that may have lewis bit 
confusing. Before going home, make sure you are able to do these:  
; I can tell you about my condition.  
; I can tell you how to prevent infection and care for the tube and bag of my 
Ruff catheter.  
; I can tell you what I will do if my urine stops flowing or there is a burning 
or painful feeling in my bladder.  
  
  
  
  
  
  
  
  
  
  
  
  
  
  
  
  
  
  
10  
  
Intermittent Self-Catheterization (ISC) Instructions  
  
After your operation, you may experience swelling around the bladder and 
urethra. Swelling may limit the ability to pass urine naturally through your 
urethra. Intermittent Self-Catheterization (ISC) will allow you to pass your 
urine until this swelling subsides. This technique prevents discomfort from 
bladder over distention.  
  
By following the simple routine below, you will be able to use ISC to empty your
bladder until yournatural ability to pass urine gradually returns. You will find
that as your ability to pass urine naturally improves, the need for ISC will be 
less. On average, a woman should expect to have use ISC from 2 to 4 weeks. You 
may reuse catheters by cleaning them in hot soapy water if low on supplies.  
  
1. Attempt to pass urine naturally as often as you like.  
At first, do not be surprised if you cannot pass urine in very small amounts.  
  
2. Try to void naturally immediately before each self-catheterization.  
3. During the day, self-catheterize every 3 - 4 hours to ensure bladder 
emptying.  
  
Use your non-dominant hand to gently spread your labia and wipe your urethra 
with a soap wipe.  
Use your dominant hand to introduce the catheter into the urethra until urine 
flow starts and then stops.  
  
4. Measure the residual urine drained out of the catheter at the end of each 
interval.  
Use the measuring hat. If your catheterized amount is 400 ml or more, self-
catheterize more often (every 2 -3 hours).  
  
5. Start out with a self-catheterization schedule of every 3 - 4 hours if 
possible.  
This is equivalent to self-catheterizing 6 - 8 times per day.  
  
6. When the amount catheterized is less than 150 ml on 2 occasions, increase 
your catheterization interval to every 6 hours, or  
4 times per day.  
  
7. When the amount catheterized is less than 150 ml on 2 occasions, increase 
your catheterization interval to every 8 hours, or 3 times per day.  
8. When the amount catheterized is less than 150 ml on 2 occasions, increase 
your catheterization interval to every 12 hours, or 2 times per day.  
9. When the amount catheterized is less than 150 ml on 2 occasions, increase 
your catheterization interval to every 24 hours, or 1 time per day.  
10. Once the amount catheterized at the 24 hour interval is less than 150 ml on 
1 occasion, self-catheterization may be stopped.  
  
SPECIAL INSTRUCTIONS  
  
* Call Dr. Tirado's office at [phone] on a weekly basis to report to Vanessa on
your progress.  
* Call Dr. Tirado with the following problems:  
1. Symptoms of a urinary infection (pain, burning, urgency, frequency, or 
blood).  
2. Fever greater than 100.4 F on 2 occasions 4 hours apart.  
3. The inability to pass urine through the catheter.  
  
  
  
11  
  
  
Suprapubic Catheter Instructions  
  
After your operation, you may experience swelling around the bladder and 
urethra. Swelling may limit your ability to pass urine naturally through your 
urethra. The suprapubic catheter (SPC) placed into your pubic hair area allows 
you to pass urine until swelling subsides. The SPC prevents discomfort from 
bladder over distention. The SPC site may be cleaned with soap and water. The 
SPC tucks discreetly into your underpants.  
  
By following the simple routine below, you will be able to use the SPC to empty 
your bladder until your natural ability to pass urine gradually returns. You 
will find that as your ability to pass urine naturally improves, the need to use
your SPC will be less. On average, a woman should expect to have the SPC from 2 
to 4 weeks.  
  
1. Attempt to pass urine naturally as often as you like.  
At first, do not be surprised if you cannot pass urine in very small amounts.  
  
2. Try to void naturally immediately before each unplugging of the SPC.  
3. During the day, unplug your SPC every 3 - 4 hours to ensure bladder emptying.  
So you can sleep well at night, connect your SPC to the night bag.  
  
4. Measure the residual amount drained out of the SPC at the end of each 
interval.  
Use the measuring hat. If your SPC amount is 400 cc or more, unplug more often 
(every 2 -3 hours).  
  
5. Start out with an unplugging interval of every 3 - 4 hours if possible.  
This is equivalent to unplugging 6 - 8 times per day.  
  
6. When the amount unplugged is less than 150 ml on 2 occasions, increase your 
unplugging interval to every 6 hours, or 4 times per day.  
7. When the amount unplugged is less than 150 ml on 2 occasions, increase your 
unplugging interval to every 8 hours, or 3 times per day.  
At the 8 hour interval, you no longer need to connect your SPC to the night bag.  
  
8. When the amount unplugged is less than 150 ml on 2 occasions, increase your 
unplugging interval to every 12 hours, or 2 times per day.  
9. When the amount unplugged is less than 150 ml on 2 occasions, increase your 
unplugging interval to every 24 hours, or 1 time per day.  
10. Once the amount unplugged at the 24 hour interval is less than 150 cc on 1 
occasion, the SPC may then be removed.  
  
SPECIAL INSTRUCTIONS  
  
* Call Dr. Tirado's office at [phone] on a weekly basis to report on your 
progress or to arrange for removal of the SPC.  
  
* Call Dr. Tirado with the following problems:  
1. Symptoms of a urinary infection (pain, burning, urgency, frequency, or 
blood).  
2. Fever greater than 100.4 F on 2 occasions 4 hours apart.  
3. The inability to pass urine through the SPC.  
  
12  
  
HOW TO CARE FOR YOUR SUPRAPUBIC URINARY CATHETER  
  
About this Topic: A suprapubic urinary catheter is a bendable rubber tube. It is
put into an opening called a stoma. This is made in the lower belly wall that 
goes straight into the bladder. A bulb at the end of the tube sits against the 
bladder wall and keeps the tube from moving out of place. Thecatheter is used to
drain urine from the bladder when the bladder is not working the right way or is
blocked. This type of catheter may have less chance of infection.  
  
General: The skin near the stoma should be cleaned every day and checked for 
signs of infection. These include redness, swelling, pus, and soreness.  
  
Cleaning the Skin Near the Stoma: You may take showers, but check with your 
doctor about bathing, hot tubs, and swimming pools. Avoid using creams, powders 
or sprays near the tube site.  
  
; Get the Things You Will Need:  
; Gloves * Warm water and non-scented soap  
; Clean, dry washcloth and towel * Clean gauze bandage  
; Surgical tape * Plastic Trash Bag  
; Wash your hands with soap and water.  
; Put on clean gloves  
; Hold the skin on all sides of the stoma. Gently take off the bandage. Be 
careful not to pull out the catheter. Throw the bandage away in the trash bag.  
; Check for redness or swelling, colored or foul-smelling urine or drainage, or 
skin changes. Thesemay be signs of an infection. Call your doctor.  
; Hold the end of the catheter tube while cleaning.  
; Wash the base of the catheter and the skin near the stoma with warm soap and 
water. Wash away from the stoma, gently pat dry with a towel.  
; Secure the catheter with a clean gauze bandage and tape.  
  
Changing the Catheter:  
; Get the Things You Will Need:  
; Drainage bag * Sterile gloves  
; Syringe to remove water in the catheter balloon * Clean sterile gauze bandage  
; Surgical tape * Plastic trash bag  
; Wash your hands with soap and water before and after changing the catheter.  
; Put on clean gloves.  
; Take off the old bandage or dressing and throw in the trash bag.  
; Clean the skin at the site.  
; Remove and throw away your gloves.  
; Open packages carefully before putting on sterile gloves so you do not infect 
yourself once gloved. Be careful not to contaminate the sterile items inside the
packages, mainly the new catheter. It is very helpful to have someone help you.  
; Put on sterile gloves and take care to keep things sterile at all times when 
working with the newcatheter.  
; Fix the new catheter as you have been trained.  
; Using the syringe, remove water from the old catheter bulb.  
; Keep your fingers close to the site. Gently pull the catheter until it comes 
out. You can tell how far in the new catheter should go from the length of the 
catheter you took out.  
; Clean the skin at the site again.  
  
13  
  
; Make sure the drainage bag is attached to the new catheter.  
; Gently put the new catheter in as far as the old one had been.  
; Once urine begins to flow, inflate the bulb with about 8-10 mL water or the 
amount given on the package. If you have pain or feel resistance, withdraw the 
catheter a little and try again. If you cannot get the catheter in, cover the 
opening and call your caregiver right away.  
; Secure the catheter with a new bandage. Tubing should be loose so it does not 
pull on the catheter.  
; Throw away your gloves and any old catheter supplies.  
  
What Problems Could Happen?  
; Infection * Bleeding  
; Kidney damage * Bladder injury  
; Bladder stones * Catheter gets displaced causing a leak or blockage  
  
When Do I Need to Call the Doctor?  
; Signs of infection these include a fever of 100.4F (38C) or higher, chills, 
stomach pain.  
; Signs of wound infection these include swelling, redness, warmth around the 
catheter site, too much pain when touched, yellowish/greenish or bloody 
discharge, foul smell coming from the site.  
; Cloudy or foul smelling urine.  
; Blood in urine.  
; Stones or sediment in the tubing or drainage bag.  
; Soreness near the tube.  
; Very bad bladder pain or spasms.  
; Urine leaking around the tube.  
; No urine flowing into the bag; this could be a sign that the tube is clogged.  
; Tubing comes out.  
  
Helpful Tips:  
; To lower your chance if infection or other problems:  
; Always wash your hands before and after you care for your catheter.  
; Check your catheter often to be sure it is in the right position and secure to
avoid leakage at the stoma.  
; Keep the urine drain bag below the level of your bladder.  
; Make sure to keep the tubing free of kinks so the urine flows freely.  
; Avoid wearing tight-fitting clothing over the tubing that could block the flow
of urine.  
; Do not re-use single use drainage bags.  
; Drink lots of liquids each day; avoid caffeine drinks and beer, wine, and 
mixed drinks (alcohol) which may bother the bladder.  
; Eat lots of fiber each day to avoid hard stools. This will help to avoid a 
blockage and bladder pressure.  
  
  
14  
  
RECORD FOR VOIDED AND POSTVOID AMOUNTS  
Please record the time you void, the amount voided, and the amount left in your 
bladder after you empty it with your catheter.  
  
Date/Time  
Voided Amount Post Void Residual Amount  
Date/Time  
Voided Amount Post Void Residual Amount  
  
  
  
  
  
  
  
  
  
  
  
  
  
  
  
  
  
15  
  
[facility]  
  
  
HOME SUPPLY LIST  
  
Please contact your local pharmacy or medical supply store regarding supplies 
for the type of catheter you may have.  
  
Transurethral Ruff catheter  
1 Night bag and 1 Day bag with leg strap  
Lubricating jelly  
Alcohol wipes  
Measuring had for toilet seat  
5 50 mL syringes  
  
Self-Intermittent Catheterization Supplies:  
100 #11-French female catheters (hydrophilic if possible)  
Lubricating jelly  
Alcohol wipes  
Measuring hat for toilet seat  
Hand-held mirror  
  
Suprapubic catheter kit  
1 Roll Transpire tape 1 inch 10 yards  
1. Box of 25 drain dressings, precut, 6 pi 4 x 4  
1 package catheter plugs  
1. 2000 mL urinary drainage bag  
1 day bag with leg strap  
1 60 mL syringe catheter tip  
1 Quart vinegar  
  
You may obtain the above supplies from the Pharmacy Counter at their 3 
locations.  
  
[address] [place] [address] [id number] [place] [address] [id number] [place] [id number]  
[phone]  
  
  
Store hours:  
Monday through Friday 9 AM to 6 PM  
Saturday 9 AM to 1 PM  
 closed  
  
16  
  
  
  
5  
[facility]  
  
The patient, ___________________________ (name), has been discharged from the 
hospital / surgery center on _________________________ (date) from her 
operation. The patient has been given the appropriate postoperative instructions
and they have been reviewed thoroughly with the patient and her family as 
necessary.  
The patient voices understanding and all questions have been answered to her and
/ or her family s satisfaction.  
  
Regarding her voiding ability at the time of discharge from the hospital:  
  
[ ]  
Patient was able to void on her own. (*RN to remove pages 7-16*)  
[ ]  
The patient was discharged with an indwelling Ruff catheter and will do 
clamping trials at home. She will need a voiding trial at the office in 1 week. 
(*RN to include pages 7-10,15,16 & remove pages 11-14*)  
[ ] The patient was discharged with an indwelling Ruff catheter and bladder 
rest. She will not undergo a voiding trial unless directed by Dr. Tirado at the 
first post-operative visit.  
  
(*RN to include pages 7-10,15,16 & remove pages 11-14*)  
[ ]  
Patient was taught intermittent self-catheterization.  
(*RN to include pages 7,8,11,15,16 & remove pages 9,10,12-14*)  
[ ]  
  
  
The patient was discharged with a suprapubic catheter. (*RN to include pages 
7-10,12-16 & remove page 11*)  
__________________________________________________ ____________  
RN Signature Date  
  
  
_________________________________________________ ____________  
Patient or Family Member Signature Date  
  
FAX COMPLETED FORM TO:  
Marquise Tirado DO  
FAX: [phone]  
  
  
  
  
  
  
[facility]  
Work Phone: [phone]- History of Present illness Narrative* Cee Arthur RN - 2021 11:07 AM EST  
  
Formatting of this note might be different from the original.  
Patient completed double void with an output of 325. Bladder scan shows 71 ml 
after void. Patient will go home after lunch.  
  
  
  
Electronically signed by Cee Arthur RN at 2021 11:08 AM EST  
  
  
* Marquise Tirado DO - 2021 5:57 AM EST  
  
Formatting of this note is different from the original.  
Urogynecology Progress Note  
  
Date: 2021 Time: 5:57 AM  
  
Payton Bravo 71 y.o. female POD # 1 s/p robotic sacrocolpopexy, anterior 
and posterior repairs, cystoscopy on 21on 21  
  
Patient seen and examined. She complained of nothing at this time. Nursing was 
concerned as patientappeared confused and was not oriented this morning around 
0345. Patient is currently alert and oriented to person, place and time. Her 
pain is controlled. Patient is tolerating oral intake. She is urinating via 
ruff. She report minimal vaginal bleeding. She is ambulating without 
difficulty. She is not passing flatus. She denies Fever/Chills, Chest Pain, SOB,
N/V, Calf Pain.  
  
The patient recalls from preoperative counseling and accepts that up to 26% of 
women may persist inthese symptoms or develop de elvie incontinence. If 
preoperative bladder testing was negative, therewas no indication for an 
incontinence procedure. The patient understands if these symptoms persist,
further treatment may be recommended.  
  
Vitals:  
Vitals:  
21 1845 21 1915 21 2300 21 0327  
BP: 112/61 116/60 102/79  
Pulse: 88 71 92  
Resp: 16  22  
Temp: 98.2 F (36.8 C) 97.7 F (36.5 C)  
TempSrc: Oral Axillary  
SpO2: 97% 99% 98%  
Weight: 114 lb 13.8 oz (52.1 kg)  
Height:  
  
Intake/Output:  
Last Shift: I/O last 3 completed shifts:  
In: 2540 [P.O.:240; I.V.:2300]  
Out: 625 [Urine:525; Blood:100]  
Current Shift: I/O this shift:  
In: -  
Out: 1300 [Urine:1300]  
300 mL out in last 1.25 hrs ~ 240 mL/hr  
  
Physical Exam:  
General: no apparent distress, alert and cooperative  
Neurologic: alert, oriented, normal speech, no focal findings or movement 
disorder noted  
Lungs: No increased work of breathing, good air exchange, clear to auscultation 
bilaterally, no crackles or wheezing  
Heart: normal S1 and S2, regular rate and rhythm and no murmur, rubs, or gallops  
Abdomen: soft, non-distended, appropriate tenderness, no CVA tenderness, normal 
bowel sounds  
Incision: clean, dry, intact with dermabond  
Extremities: no calf tenderness, non edematous, SCDs in place  
Genitourinary: A direct genitourinary exam is unremarkable with normal 
abdominopelvic findings. External vulvovaginal exam reveals no bleeding, 
abnormal swelling or symmetry, infection, or discharge  
  
Lab:  
No results found for this or any previous visit (from the past 12 hour(s)).  
  
Assessment/Plan:  
Payton Bravo 71 y.o. female POD # 1 s/p robotic sacrocolpopexy, anterior 
and posterior repairs, cystoscopy on 21on 21  
- Doing well, vitals stable  
- Ruff catheter to be removed, will complete voiding trial this AM, UOP 
adequate  
- Discontinue IVF  
- Encourage ambulation and use of incentive spirometer  
- Pain control: Motrin/Tylenol. Will hold Norco for now as patient is very 
sensitive to opioid painmedication.  
- Labs: CBC, BMP in the process currently, will review once they have resulted  
- DVT Proph: Lovenox 40mg to be given on POD#1 AM if Hgb >8  
- Abx: Ancef x24h  
- Diet: General diet  
- Continue post-op care, please page with any questions  
- Likely discharge home later today  
  
AM rounds. Patient doing well. Pain controlled. Mild dilerium recognized by 
patient. Encouraged only NSAIDS and Protein shakes for better nutrition. 
Encouraged prunes for bowels. Voiding trial this AM. Labs stable and UO 
adequate. Patient very appreciative and can tell difference with support. MDM/P
OC updated with team. Instructions given and understood. Video links given. DC 
later today. CHRISTOPHER Branch MD  
Ob/Gyn Resident   Pager: [phone]  
2021, 5:57 AM  
  
  
  
  
  
Electronically signed by Marquise Tirado DO at 2021 6:49 AM EST  
  
  
* Niurka Mcmullen RN - 2021 4:13 AM EST  
  
Formatting of this note might be different from the original.  
Nurse observed patient out of the bed trying to un tangle the cords. When asked 
what the pt was doing, the pt stated she was trying to get untangled. Nurse 
asked patient where she was pt stated her address at home. Pt was very 
uncooperative and didn't want to get back in the bed. Nurse had to keep r
eminding the pt that she was at the hospital and that she just had surgery. 
Vitals were taken, all WNL. There was some small amount of blood on bed pad. Pt 
was placed back into bed and fell back to sleep. Nurse contactd Dr. Gabi Pino
 to let her know pt status, she stated to continue to monitor pt and let her 
know of any changes. Will continue to monitor  
  
  
  
Electronically signed by Niurka Mcmullen RN at 2021 4:18 AM EST  
  
  
* Sarah Fritsch, RN - 2021 7:04 PM EST  
  
Formatting of this note might be different from the original.  
rn entered room at 1730 and pt was non responsive and dusky. RN attempted to 
wake pt with no success. Pulse was found, rapid response called. Pt placed on 
non rebreather, Narcan given, pt came too and was responsive, color returned. Pt
 vitals stable.  
Will continue to monitor  
  
  
  
Electronically signed by Sarah Fritsch, RN at 2021 7:08 PM EST  
  
  
* Marquise Tirado,  - 2021 6:02 PM EST  
  
Formatting of this note is different from the original.  
Gynecology Progress Note  
  
Date: 2021 Time: 6:02 PM  
  
Payton [place] 71 y.o. female, POD #0 s/p robotic sacrocolpopexy, anterior 
and posterior repairs, cystoscopy on 21on 21  
  
Patient seen and examined. She complained of nothing. Pain is controlled. 
Patient is tolerating oral intake. She is urinating via ruff. She reports 
minimal vaginal bleeding. She is not yet ambulating but plans to do so later 
today. She is not passing flatus. She denies Fever/Chills, Chest Pain, SOB, N/V,
 Calf Pain.  
  
The patient recalls from preoperative counseling and accepts that up to 26% of 
women may persist inthese symptoms or develop de elvie incontinence. If 
preoperative bladder testing was negative, therewas no indication for an 
incontinence procedure. The patient understands if these symptoms persist,
further treatment may be recommended.  
  
Vitals:  
Vitals:  
21 1605 21 1610 21 1615 21 1634  
BP: (!) 154/83 (!) 147/84 134/73 133/69  
Pulse: 67 68 63 66  
Resp: 17 17 11 16  
Temp: 97.7 F (36.5 C)  
TempSrc: Oral  
SpO2: 100% 100% 99% 98%  
Weight:  
Height:  
  
Intake/Output:  
Last Shift: I/O last 3 completed shifts:  
In: 1500 [I.V.:1500]  
Out: 100 [Blood:100]  
Current Shift: I/O this shift:  
In: 1040 [P.O.:240; I.V.:800]  
Out: 125 [Urine:125]  
500 mL out in last 1.5 hrs ~ 333 mL/hr  
  
Physical Exam:  
General: no apparent distress, alert and cooperative  
Neurologic: alert, oriented, normal speech, no focal findings or movement 
disorder noted  
Lungs: No increased work of breathing, good air exchange, clear to auscultation 
bilaterally, no crackles or wheezing  
Heart: normal S1 and S2, regular rate and rhythm and no murmur, rubs, or gallops  
Abdomen: soft, non-distended, appropriate tenderness, no CVA tenderness, 
hypoactive bowel sounds  
Incision: clean, dry, intact and dressed with skin adhesive  
Extremities: no calf tenderness, non edematous, SCDs  
Genitourinary: A direct genitourinary exam is unremarkable with normal 
abdominopelvic findings. External vulvovaginal exam reveals no bleeding, 
abnormal swelling or symmetry, infection, or discharge  
  
Lab:  
No results found for this or any previous visit (from the past 12 hour(s)).  
  
Assessment/Plan:  
Payton Bravo 71 y.o. female, POD #0 s/p robotic sacrocolpopexy, anterior 
and posterior repairs, cystoscopy on 21  
- Doing well, vitals stable  
- Continue ruff catheter will complete voiding trial in the AM, UOP adequate  
- Continue IVF @100ml/hr  
- Encourage ambulation and use of incentive spirometer  
- Pain control: Morphine PRN and transition to oral pain medications Motrin and 
Norco for breakthrough pain  
- Labs: CBC, BMP in the AM  
- DVT Proph: Lovenox 40mg on POD#1 AM if Hgb >8  
- Abx: S/p Gent/Clinda, Will continue clinda x24h per SCIP protocol  
- Diet: Advance as tolerated to general diet  
- Path: pending  
- Continue post-op care, please page with any questions  
  
PM rounds updated by phone. Patient sensitive to narcotics - will limit. Patient
 doing well now. MDM/POC updated. [facility]  
  
Will update Dr. Tirado.  
  
Gabi Branch MD  
Ob/Gyn Resident   Pager: [phone]  
2021, 6:02 PM  
  
  
  
  
Electronically signed by Marquise Tirado DO at 2021 6:46 AM EST  
  
  
* Gabi Branch MD - 2021 5:48 PM EST  
  
Formatting of this note is different from the original.  
Urogynecology Resident Interval Note  
  
Rapid response was called. Patient seen and evaluated at bedside with senior 
resident and Dr. Matson. Patient minimally responsive. Vitally stable. Lungs 
clear bilaterally. It was presumed that she received too much opioid pain 
medication. Narcan given at bedside. Patient regained consciousness. Alert and 
oriented x3. Vitals continued to be stable.  
  
Vitals:  
21 1605 21 1610 21 1615 21 1634  
BP: (!) 154/83 (!) 147/84 134/73 133/69  
Pulse: 67 68 63 66  
Resp: 17 17 11 16  
Temp: 97.7 F (36.5 C)  
TempSrc: Oral  
SpO2: 100% 100% 99% 98%  
Weight:  
Height:  
  
Physical exam  
General appearance minimally responsive, only to sternal rub. AAOx3 after narcan
 given  
HEENT: head atraumatic, normocephalic, pinpoint pupils bilaterally, moist mucous
 membranes, tracheamidline  
Neurologic: no focal findings or movement disorder noted  
Lungs: No increased work of breathing, good air exchange, clear to auscultation 
bilaterally, no crackles or wheezing  
Heart: regular rate and rhythm and no murmur  
Abdomen: soft, gravid, and non-tender, incisions clean, dry and intact with 
dermabond  
Extremities: no calf tenderness, non edematous  
  
Payton [place] 71 y.o. female, POD #0 s/p robotic sacrocolpopexy, anterior 
and posterior repairs, cystoscopy on 21on 21  
  
Opioid intoxication  
- Will use primarily Toradol/Tylenol for pain control with minimal opioid 
medication  
  
Dr. Tirado updated and in agreement with plan  
  
Gabi Branch MD  
OB/GYN Resident, PGY1  
[place], [place]  
2021, 5:48 PM  
  
  
  
  
  
Electronically signed by Gabi Branch MD at 2021 6:02 PM EST  
  
  
* Regina Perez RN - 2021 4:17 PM EST  
  
Formatting of this note might be different from the original.  
DAY OF SURGERY/PROCEDURE  
GUIDELINES  
  
As a patient at the [facility], you can expect quality 
medical and nursing care that is centered on your individual needs. It is our 
goal to make your surgical experience as comfortable and excellent as possible.  
________________________________________________________________________  
  
The following instructions are general guidelines, if any information on this 
sheet is different from what your doctor has instructed you to do, please follow
 your doctor's instructions.  
  
Please arrive on  1100  
  
Enter through entrance C. Check in at registration  
  
Upon arrival you will be taken to the pre-operative area to get ready for 
surgery, your family willstay in the waiting room and visit with you once you 
are ready for surgery. Due to special limitations please limit visitation to 1-2
 members of your family at a time. When it is time for surgery your family will 
return to the waiting room.  
  
You will be given a specific time when you will NOT be able to eat, drink, 
smoke, suck or chew ANYTHING (no water, gum, mints, cigarettes, cigars, pipes, 
snuff, chewing tobacco, etc.) or your surgerymay be canceled. This will occur 
during your PAT appointment or by phone 1-2 days before surgery  
  
Take a shower or bath on the morning of your surgery/procedure (Hibiclens if 
directed)  
  
Brush your teeth, but do not swallow any water  
  
IN CASE OF ILLNESS - If you have a cold or flu symptoms (high fever, runny nose,
 sore throat, cough, etc.) rash, nausea, vomiting, loose stools, and/or recent 
contact with someone who has a contagious disease (chick pox, measles, etc.) 
please call your doctor before coming to the surgery center  
  
If applicable bring your:  
Inhaler (s)  
Hearing aid(s)  
Eyeglasses and Case (If you wear contacts they have to be removed before 
surgery, bring case and solution)  
CPAP  
  
DO NOT take anticoagulants (blood thinners, aspirin or aspirin-containing 
products) as instructed by your physician.  
  
Wear loose, comfortable clothing that is easy to put on and take off. They will 
remain in post-op with the nurse.  
  
If you will be returning home the same day as your surgery, you will need to 
have a responsible adult (18 years of age or older) present to drive you home. 
You will need someone stay with you at homefor the first 24 hours following your
 surgery. This is due to the anesthesia and the medication given to you during 
surgery and recovery.  
  
  
  
  
  
Electronically signed by Regina Perez RN at 2021 4:18 PM EST  
  
  
documented in this [facility] Phone: [phone] note*   
  
                                                    Diagnosis  
   
                                                      
  
  
Post-op pain- Primary  
  
  
Other acute postoperative pain  
   
                                                      
  
  
S/P sacrocolpopexy  
  
documented in this encounter  
[employer] Phone: [phone] [facility]No [email or web address] 
[employer]  
Other Phone: [phone] note*   
  
                                                    Diagnosis  
   
                                                      
  
  
Polyp of colon  
  
  
Benign neoplasm of colon  
   
                                                      
  
  
Benign neoplasm of transverse colon  
   
                                                      
  
  
Unspecified asthma, uncomplicated  
   
                                                      
  
  
Dermatitis, unspecified  
   
                                                      
  
  
Personal history of other malignant neoplasm of skin  
   
                                                      
  
  
Personal history of nicotine dependence  
   
                                                      
  
  
Allergy status to penicillin  
   
                                                      
  
  
Benign neoplasm of colon, unspecified  
   
                                                      
  
  
Encounter for screening for malignant neoplasm of colon  
  
documented in this encounter  
[facility]  
Work Phone: [phone] note*   
  
                                                    Diagnosis  
   
                                                      
  
  
Encounter for screening for malignant neoplasm of colon- Primary  
   
                                                      
  
  
Polyp of colon  
  
  
Benign neoplasm of colon  
  
documented in this encounter  
[facility]  
Work Phone: [phone] note*   
  
                                                    Diagnosis  
   
                                                      
  
  
Encounter for screening for malignant neoplasm of colon- Primary  
   
                                                      
  
  
Polyp of colon  
  
  
Benign neoplasm of colon  
  
documented in this encounter  
[facility]  
Work Phone: [phone] note*   
  
                          Diagnosis    Onset Date   Resolution   Status  
   
                          Chronic pain                           acute  
   
                          Lumbosacral spondylosis                           acut  
e  
   
                          Sacroiliitis                           acute  
  
  
[facility]  
Work Phone: [phone] note*   
  
                                                    Diagnosis  
   
                                                      
  
  
Encounter for subsequent annual wellness visit (AWV) in Medicare patient- 
Primary  
   
                                                      
  
  
Encounter for screening mammogram for malignant neoplasm of breast  
   
                                                      
  
  
Raynaud's phenomenon without gangrene  
   
                                                      
  
  
Ulcerative colitis without complications, unspecified location (CMS/HCC)  
   
                                                      
  
  
Open wound of left lower leg, sequela- Primary  
  
documented in this encounter  
CELINES Rachael for visit Narrative* Auth/Cert  
  
                          Specialty    Diagnoses / Procedures Referred By Contac  
t Referred To Contact  
   
                                                              
  
  
Diagnoses  
  
  
Cystocele, unspecified  
  
  
Rectocele  
  
  
Vaginal prolapse  
  
  
CYSTOCELE  
  
  
RECTOCELE  
  
  
VAGINAL PROLAPSE  
  
  
  
Procedures  
  
  
LA LAPAROSCOPY, SURG,   
COLPOPEXY  
  
  
LA CYSTOURETHROSCOPY  
  
  
LA OFFICE/OUTPT   
VISIT,PROCEDURE ONLY  
  
  
SACRAL COLPOPEXY ROBOTIC   
WITH RESTORELLE MESH  
  
  
CYSTOSCOPY  
  
  
possible VAGINAL REPAIR   
ANTERIOR AND POSTERIOR                    
  
  
Marquise Tirado, DO  
  
  
[address]  
  
  
[address]  
  
  
[place] [address]  
  
  
Phone: [phone]  
  
  
Fax: [phone]                         
  
  
[facility]  
  
  
[address]  
  
  
[place] [address]  
  
  
Phone: [phone]  
  
  
  
                Referral ID Status  Reason  Start Date Expiration Date Visits Re  
quested Visits   
Authorized  
   
                [id number]                                 1       1  
  
  
  
[facility] Phone: [phone]  
  
Summary Purpose  
  
  
                                                      
  
  
  
Family History  
No Family History Records Found  
  
                          Relationship Condition    Age at Onset Recorded Date/T  
ge  
   
                          Not Specified Malignant neoplasm of pancreas Unknown    
      
   
                                       Malignant neoplasm of colon Unknown        
   
                          family member Malignant neoplasm of colon Unknown       
   
  
  
  
Advance Directives  
No Advanced Directives Records FoundLatest Code Status on File  
  
                          Code Status  Date Activated Date Inactivated Comments  
   
                          Full Code    2021 4:35 PM                
  
  
  
                                Advance Directive Response        Recorded Date/  
Time  
   
                                Advance Directives No              May 16th, 202  
3 10:29am  
  
  
  
Hospital Course  
  
  
                                                    Note  
   
                                                    [facility] SURGERY  
 Clinical Discharge Summary PERSON INFORMATION Name   
PAYTON BRAVO Age 69 Years  1950 Sex FEMALE Language English PCP 
Michael GARCIA Marital Status  Phone [phone] Med Service Ambulatory   
Surgery MRN [id number] Acct# Arrival 11/15/2019 09:50:58 Visit Reason SURGERY-LEFT
   
WRIST FRACTURE, CLOSED REDUCTION, POSSIBLE PERC PINNING, POSSIBLE ORIF Acuity 
   
24:50 Address: [address] [place] [id number] Comment: PROVIDER INFORMATION 
VITALS   
INFORMATION Vital Sign Triage Latest Temp Oral Temp Temporal Temp Intravascular 
Temp   
Axillary Temp Rectal 02 Sat 98 % 95 % Respiratory Rate Peripheral Pulse Rate 
Apical   
Heart Rate Blood Pressure / 85 mmHg / 75 mmHg Comment: MEDICAL INFORMATION 
Allergy   
Info: Latex Allergy; penicillin Prescriptions Given:   
chondroitin/glucosamine/methylsulfonylmethane (Glucosamine & Chondroitin with 
MSM) 1   
tab(s) Oral every day. Medication List: Medications to Continue That Have Not 
Changed   
Other Medications chondroiti (more content not included)...  
  
  
  
                                                    Note  
   
                                                    Patient: PAYTON BRAVO   
MRN: [id number] FIN: [id number] Age: 69 years Sex:   
FEMALE   
: 1950 Associated Diagnoses: None Author: Noman Cherry MD 
Postoperative   
Information Post Operative Note: Post Anesthesia Care Unit. Anesthetic utilized:
   
General. Health Status Allergies: Allergic Reactions (All) Severity Not 
Documented   
Latex Allergy- Rash. Penicillin- Hives. Physical Examination VS/Measurements 
Vital   
Signs (last 24 hrs)_____ Last Charted___________ Heart Rate Peripheral 71 bpm 
(NOV 15   
14:) Resp Rate H 60br/min (NOV 15 14:) SBP H 143mmHg (NOV 15 14:) DBP 85 
mmHg   
(NOV 15 14:) SpO2 99 % (NOV 15 14:) Weight 47.200 kg (NOV 15 10:) Pain   
assessment: Self-reports no pain. General: Alert and oriented, No acute 
distress.   
Respiratory: Respirations are non-labored. Cardiovascular: Normal rate, Regular   
rhythm. Review / Management Condition: Stable. Assessment Anesthetic outcome No   
anesthetic complications noted. Adequate pain relief. No Complaint of nausea and
   
vomiting. Plan Tr (more content not included)...  
  
  
  
Procedure Findings  
  
  
                                                    Note  
   
                                                    Patient: PAYTON BRAVO   
MRN: [id number] FIN: [id number] Age: 69 years Sex:   
FEMALE   
: 1950 Associated Diagnoses: None Author: Noman Cherry MD 
Postoperative   
Information Post Operative Note: Post Anesthesia Care Unit. Anesthetic utilized:
   
General. Health Status Allergies: Allergic Reactions (All) Severity Not 
Documented   
Latex Allergy- Rash. Penicillin- Hives. Physical Examination VS/Measurements 
Vital   
Signs (last 24 hrs)_____ Last Charted___________ Heart Rate Peripheral 71 bpm 
(NOV 15   
14:) Resp Rate H 60br/min (NOV 15 14:) SBP H 143mmHg (NOV 15 14:) DBP 85 
mmHg   
(NOV 15 14:) SpO2 99 % (NOV 15 14:) Weight 47.200 kg (NOV 15 10:) Pain   
assessment: Self-reports no pain. General: Alert and oriented, No acute 
distress.   
Respiratory: Respirations are non-labored. Cardiovascular: Normal rate, Regular   
rhythm. Review / Management Condition: Stable. Assessment Anesthetic outcome No   
anesthetic complications noted. Adequate pain relief. No Complaint of nausea and
   
vomiting. Plan Tr (more content not included)...  
  
  
  
Reason for Referral  
  
  
                          Specialty    Diagnoses / Procedures Referred By Contac  
t Referred To Contact  
   
                                        Gastroenterology      
  
  
Diagnoses  
  
  
Polyp of colon  
  
  
  
Procedures  
  
  
Colonoscopy Screening  
  
  
Colonoscopy Screening  
  
  
LA COLONOSCOPY FLX DX   
W/COLLJ SPEC WHEN PFRMD  
  
  
LA COLON CA SCRN NOT HI   
RSK IND  
  
  
LA COLORECTAL SCRN; HI   
RISK IND  
  
  
LA COLONOSCOPY W/BIOPSY   
SINGLE/MULTIPLE  
  
  
LA COLSC FLX W/RMVL OF   
TUMOR POLYP LESION SNARE   
TQ  
  
  
LA COLSC FLX W/REMOVAL   
LESION BY HOT BX FORCEPS                  
  
  
Ana Oreilly MD  
  
  
[address] [address]  
  
  
Phone: [phone]  
  
  
Fax: [phone]                         
  
  
  
  
  
                    Referral ID Status    Reason    Start Date Expiration Date V  
isits   
Requested                               Visits   
Authorized  
   
                                        [id number]              Pending   
Review                    2023    3/12/2024    1            1  
  
  
  
Chief Complaint and Reason for Visit  
  
  
                                        Chief Complaint     back hip pain  
m47.817  
   
                                        Reason for Visit    Chronic pain  
Lumbosacral spondylosis  
Sacroiliitis  
  
  
  
Additional Source Comments  
  
  
  
                                                    INFORMATION SOURCE (unrecogn  
ized section and content)  
   
                                          
  
  
  
                                        DATE CREATED        AUTHOR  
   
                                2019                      [facility]  
  
  
  
                                DATE CREATED    AUTHOR          AUTHOR'S ORGANIZ  
ATION  
   
                                2021                      [facility]  
  
  
  
                                DATE CREATED    AUTHOR          AUTHOR'S ORGANIZ  
ATION  
   
                                2023                      [facility]  
  
  
  
                                DATE CREATED    AUTHOR          AUTHOR'S ORGANIZ  
ATION  
   
                                2023                      [facility]  
  
  
  
                                DATE CREATED    AUTHOR          AUTHOR'S ORGANIZ  
ATION  
   
                                2023                      [facility]  
  
  
  
                                DATE CREATED    AUTHOR          AUTHOR'S ORGANIZ  
ATION  
   
                                2023                      [facility]  
  
  
  
                                DATE CREATED    AUTHOR          AUTHOR'S ORGANIZ  
ATION  
   
                                01/10/2024                      [facility]  
  
  
  
                                DATE CREATED    AUTHOR          AUTHOR'S ORGANIZ  
ATION  
   
                                2024                      [facility]  
[facility]  
  
  
  
                                DATE CREATED    AUTHOR          AUTHOR'S ORGANIZ  
ATION  
   
                                2024                      [facility]  
  
  
  
                                DATE CREATED    AUTHOR          AUTHOR'S ORGANIZ  
ATION  
   
                                2024                      [facility]  
[facility]  
  
  
  
  
  
                                                    Ordered Prescriptions (unrec  
ognized section and content)  
   
                                          
  
  
  
                    Prescription Sig       Dispensed Refills   Start Date End Da  
te  
   
                                                      
  
  
tamsulosin (FLOMAX) 0.4   
MG capsule                              Take 1 capsule by   
mouth daily for 7   
days Take only if   
discharged home   
without ruff and   
having difficulty   
with urination                            
  
  
7 capsule           0                   2021  
   
                                                      
  
  
senna-docusate (SENOKOT   
S) 8.6-50 MG per tablet                 Take 1 tablet by   
mouth daily                               
  
  
60 tablet           0                   2021            
   
                                                      
  
  
ciprofloxacin (CIPRO)   
250 MG tablet                           Take 1 tablet by   
mouth 2 times daily   
for 7 days Take for   
full 7 days if   
discharged home   
with ruff. Take   
for 5 days if   
discharged home   
without ruff                             
  
  
14 tablet           0                   2021  
   
                                                      
  
  
ondansetron (ZOFRAN ODT)   
4 MG disintegrating   
tablet                                  Take 1 tablet by   
mouth every 8 hours   
as needed for   
Nausea or Vomiting                        
  
  
15 tablet           0                   2021            
   
                                                      
  
  
ibuprofen (ADVIL;MOTRIN)   
600 MG tablet                           Take 1 tablet by   
mouth every 6 hours   
as needed for Pain                        
  
  
30 tablet           0                   2021            
   
                                                      
  
  
HYDROcodone-acetaminophe  
n (NORCO) 5-325 MG per   
tabletIndications:Post-o  
p pain                                  Take 1 tablet by   
mouth every 6 hours   
as needed for Pain   
for up to 3 days.   
Intended supply: 7   
days. Take lowest   
dose possible to   
manage pain                               
  
  
12 tablet           0                   2021  
   
                                                      
  
  
tamsulosin (FLOMAX) 0.4   
MG capsule                              Take 1 capsule by   
mouth daily for 7   
days Take only if   
discharged home   
without ruff and   
having difficulty   
with urination                            
  
  
7 capsule           0                   2021  
   
                                                      
  
  
simethicone (MYLICON) 80   
MG chewable tablet                      Take 1 tablet by   
mouth 4 times daily   
as needed for   
Flatulence                                
  
  
30 tablet           0                   2021  
   
                                                      
  
  
ondansetron (ZOFRAN ODT)   
4 MG disintegrating   
tablet                                  Take 1 tablet by   
mouth every 8 hours   
as needed for   
Nausea or Vomiting                        
  
  
15 tablet           0                   2021  
   
                                                      
  
  
senna-docusate (SENOKOT   
S) 8.6-50 MG per tablet                 Take 1 tablet by   
mouth daily                               
  
  
60 tablet           0                   2021  
   
                                                      
  
  
ibuprofen (ADVIL;MOTRIN)   
600 MG tablet                           Take 1 tablet by   
mouth every 6 hours   
as needed for Pain                        
  
  
30 tablet           1                   2021  
   
                                                      
  
  
HYDROcodone-acetaminophe  
n (NORCO) 5-325 MG per   
tabletIndications:Post-o  
p pain                                  Take 1 tablet by   
mouth every 6 hours   
as needed for Pain   
for up to 6 days.   
Intended supply: 7   
days. Take lowest   
dose possible to   
manage pain                               
  
  
24 tablet           0                   2021  
   
                                                      
  
  
ciprofloxacin (CIPRO)   
250 MG tablet                           Take 1 tablet by   
mouth 2 times daily   
for 7 days Take for   
full 7 days if   
discharged home   
with ruff. Take   
for 5 days if   
discharged home   
without ruff                             
  
  
14 tablet           0                   2021  
  
  
  
                                    Scheduled  
  
                                                    Active and Recently Administ  
ered Medications (unrecognized section and content)  
   
                                          
  
  
  
                          Medication Order 2021  
   
                                                      
  
  
aprepitant (EMEND) capsule   
40 mg (COMPLETED)  
40 mg, Oral, ONCE, On 21 at 1215, For 1   
dose, Pre-op (day of   
surgery)  
                                                    1152 (Given - Provider:   
Becca Garay RN)  
                                          
   
                                                      
  
  
ceFAZolin (ANCEF) 2000 mg   
in dextrose 5 % 50 mL IVPB   
(COMPLETED)  
2,000 mg, IntraVENous,   
ONCE, 1 dose, On 21 at 1130, Pre-op   
(day of surgery)  
                                                    1245 (Given - Provider:   
Mariama Ruano APRN - CRNA)  
                                          
   
                                                      
  
  
clindamycin (CLEOCIN) IVPB   
300 mg (COMPLETED)  
300 mg, IntraVENous, EVERY   
8 HOURS, 2 doses, First   
dose on 21 at   
2030, Last dose on 21 at 0430  
                                                    2058 (New Bag -   
Provider: Niurka Mcmullen RN)2148   
(Stopped - Provider:   
Niurka Mcmullen RN)  
                                        0442 (New Bag -   
Provider: Niurka Mcmullen RN)0547   
(Stopped - Provider:   
Niurka Mcmullen RN)  
  
   
                                                      
  
  
enoxaparin (LOVENOX)   
injection 40 mg  
40 mg, SubCUTAneous, DAILY,   
First dose on 21   
at 0900, Pharmacy to dose   
if renal insufficiency   
present. POD#1 if Hgb >8.0.   
Do not administer if Hgb <   
8.0, Post-op  
                                                            0900 (Not Given -   
Provider: Cee Arthur RN - Reason:   
Other)  
  
   
                                                      
  
  
famotidine (PEPCID)   
injection 20 mg (COMPLETED)  
20 mg, IntraVENous, ONCE,   
On 21 at 1215,   
For 1 dose, Administer over   
2 minutes., Pre-op (day of   
surgery)  
                                                    1152 (Given - Provider:   
Becca Garay RN)  
                                          
   
                                                      
  
  
fentaNYL (SUBLIMAZE)   
injection 100 mcg   
(COMPLETED)  
100 mcg, IntraVENous, ONCE,   
On 21 at 1215,   
For 1 dose, If oral and IV   
narcotics ordered, use oral   
first and only use IV if   
oral is ineffective or   
cannot take oral. Do Not   
give oral and IV within 1   
hour of each other unless   
specifically ordered.,   
Pre-op (day of surgery)  
                                                    1228 (Given - Provider:   
Becca Garay RN)  
                                          
   
                                                      
  
  
ketorolac (TORADOL)   
injection 15 mg  
15 mg, IntraVENous, EVERY 6   
HOURS, First dose (after   
last reorder) on 21 at 1730, For 24   
hours, Do not administer   
for more than 5 days.,   
Post-op  
                                                    1827 (Given - Provider:   
Avni Rodriguez RN)2328 (Not Given -   
Provider: Niurka Mcmullen RN - Reason:   
Patient/family refused)  
                                        0441 (Given - Provider:   
Niurka Mcmullen RN)1256   
(Given - Provider: Cee Arthur RN)  
  
   
                                                      
  
  
midazolam PF (VERSED)   
injection 2 mg (COMPLETED)  
2 mg, IntraVENous, ONCE, On   
21 at 1215, For 1   
dose, Pre-op (day of   
surgery)  
                                                    1230 (Given - Provider:   
Becca Garay RN)  
                                          
   
                                                      
  
  
naloxone (NARCAN) injection   
1 mg (COMPLETED)  
1 mg, IntraVENous, ONCE, On   
21 at 1745, For 1   
dose  
                                                    1739 (Given - Provider:   
Sarah Fritsch, RN)  
                                          
   
                                                      
  
  
phenazopyridine (PYRIDIUM)   
tablet 200 mg (COMPLETED)  
200 mg, Oral, ONCE, On 21 at 1130, For 1   
dose, Take with food. May   
cause discoloration of   
urine.  
                                                    1151 (Given - Provider:   
Becca Garay RN)  
                                          
   
                                                      
  
  
scopolamine   
(TRANSDERM-SCOP)   
transdermal patch 1 patch  
1 patch, TransDERmal,   
Administer over 72 Hours,   
ONCE, On 21 at   
1130, For 1 dose, 1.5 mg   
patch delivers 1 mg over 3   
days. Apply patch to   
hairless area behind the   
ear.  
                                                    1156 (Patch Applied -   
Provider: Becca Garay RN - Comment:   
behind rt ear)  
                                          
   
                                                      
  
  
sennosides-docusate sodium   
(SENOKOT-S) 8.6-50 MG   
tablet 1 tablet  
1 tablet, Oral, NIGHTLY,   
First dose on 21   
at 2100, Post-op  
                                                     (Given - Provider:   
Niurka Mcmullen RN)  
                                         (Due)  
  
   
                                                      
  
  
sodium chloride flush 0.9 %   
injection 5-40 mL  
5-40 mL, IntraVENous, EVERY   
12 HOURS SCHEDULED (2 times   
per day), First dose on 21 at 2100, For Line   
Patency: Peripheral IV = 5   
mL; Midline or Central Line   
= 10 mL/lumen. If following   
IV push medication,   
administer flush at same   
rate as the IV push. Flush   
volume is determined by   
type of infusion therapy   
being given. For   
non-viscous solutions use:   
Peripheral IV = 5 mL   
Midline or Central Line =   
10 mL/lumen For viscous   
solutions (i.e. blood   
components, parenteral   
nutrition, contrast media,   
or after obtaining blood   
sample) use: Peripheral IV   
= 10 mL Midline or Central   
Line = 20 mL/lumen, Post-op  
                                                     (Given - Provider:   
Niurka Mcmullen RN)  
                                        0813 (Not Given -   
Provider: Cee Arthur RN - Reason: IV   
Fluid Infusing)   
(Due)  
  
  
                                   Continuous  
  
                          Medication Order 2021  
   
                                                      
  
  
0.9 % sodium chloride infusion  
IntraVENous, at 100 mL/hr,   
CONTINUOUS, Starting on 21 at 1700, Post-op  
                                                    1740 (New Bag - Provider: Sa rah Fritsch, RN)  
                                          
   
                                                      
  
  
lactated ringers infusion   
(CANCELED)  
IntraVENous, at 100 mL/hr,   
CONTINUOUS, Starting on 21 at 1130, Pre-op (day of   
surgery)  
                                                    1204 (New Bag - Provider:   
Becca Garay RN)1245   
(NoRateChange - Provider:   
JORDANA Underwood CRNA)1327 (Paused - Provider:   
Mariama S Bindu, APRN -   
CRNA - Comment: Switch to   
gravity)1328 (New Bag -   
Provider: JORDANA Underwood CRNA)1433 (Anesthesia   
Volume Adjustment - Provider:   
JORDANA Underwood CRNA)  
                                          
  
                                       PRN  
  
                          Medication Order 2021  
   
                                                      
  
  
0.9 % sodium chloride   
infusion  
25 mL, IntraVENous, at 100   
mL/hr, PRN, If patient   
receiving piggyback   
infusions without ordered   
maintenance IV fluids or   
with frequent/long duration   
piggyback infusions,   
Starting on 21 at   
1635, Administer at the same   
rate as the piggyback being   
infused., Post-op  
                                                              
   
                                                      
  
  
acetaminophen (TYLENOL)   
tablet 1,000 mg  
1,000 mg, Oral, EVERY 6   
HOURS PRN, Pain Mild (1-3),   
Pain Moderate (4-6), Fever,   
Starting on 21 at   
0606, Maximum dose of   
acetaminophen is 4000 mg   
from all sources in 24   
hours.  
                                                              
   
                                                      
  
  
benzocaine-menthol (CEPACOL   
SORE THROAT) lozenge 1   
lozenge  
1 lozenge, Oral, EVERY 2   
HOURS PRN, Sore Throat,   
Starting on 21 at   
1747  
                                                              
   
                                                      
  
  
HYDROcodone-acetaminophen   
(NORCO) 5-325 MG per tablet   
1 tablet  
1 tablet, Oral, EVERY 4   
HOURS PRN, Pain Moderate   
(4-6), Pain Severe (7-10),   
Starting on 21 at   
1735, Maximum dose of   
acetaminophen is 4000 mg   
from all sources in 24   
hours., Post-op  
                                                    210 (Given - Provider:   
Niurka Mcmullen RN)  
                                        0506 (Held by provider   
- Provider: Gabi Branch MD - Reason:   
Other)  
  
   
                                                      
  
  
HYDROmorphone (DILAUDID)   
injection 0.5 mg (CANCELED)  
0.5 mg, IntraVENous, EVERY 5   
MIN PRN, Pain Severe (7-10),   
Starting on 21 at   
1136, For 4 doses, Phase I -   
Initial therapy for severe   
pain., PACU only  
                                                    1530 (Given - Provider:   
Hailey Vogt RN)1600 (Given -   
Provider: Hailey Vogt RN)  
                                          
   
                                                      
  
  
HYDROmorphone (DILAUDID)   
injection 0.5 mg (CANCELED)  
0.5 mg, IntraVENous, EVERY 5   
MIN PRN, Pain Severe (7-10),   
Starting on 21 at   
1136, For 4 doses, Phase I -   
Secondary therapy to be used   
after all initial severe   
pain medication doses have   
been administered., PACU   
only  
                                                    1543 (Given - Provider:   
Hailey Vogt, TEE)  
                                          
   
                                                      
  
  
magnesium hydroxide (MILK OF   
MAGNESIA) 400 MG/5ML   
suspension 30 mL  
30 mL, Oral, DAILY PRN,   
Constipation, Starting on   
21 at 1635, First   
line therapy for   
constipation., Post-op  
                                                              
   
                                                      
  
  
morphine (PF) injection 2 mg  
2 mg, IntraVENous, EVERY 4   
HOURS PRN, Pain Severe   
(7-10), Starting on 21 at 1635, If oral   
and IV narcotics ordered,   
use oral first and only use   
IV if oral is ineffective or   
cannot take oral. Do Not   
give oral and IV within 1   
hour of each other unless   
specifically ordered.,   
Post-op  
                                                              
   
                                                      
  
  
ondansetron (ZOFRAN)   
injection 4 mg (COMPLETED)  
4 mg, IntraVENous, ONCE PRN,   
Nausea, Starting on 21 at 1136, For 1   
dose, PACU only  
                                                    1530 (Given - Provider:   
Hailey Vogt RN)  
                                          
   
                                                      
  
  
ondansetron (ZOFRAN)   
injection 4 mg(Linked Group   
1)  
4 mg, IntraVENous, EVERY 6   
HOURS PRN, Nausea, Vomiting,   
Starting on 21 at   
1635, Administer if oral   
route cannot be used.,   
Post-op  
                                                              
   
                                                      
  
  
ondansetron (ZOFRAN-ODT)   
disintegrating tablet 4   
mg(Linked Group 1)  
4 mg, Oral, EVERY 8 HOURS   
PRN, Nausea, Vomiting,   
Starting on 21 at   
1635, Post-op  
                                                              
   
                                                      
  
  
oxyCODONE-acetaminophen   
(PERCOCET) 5-325 MG per   
tablet 1 tablet (COMPLETED)  
1 tablet, Oral, PRN, Pain   
Moderate (4-6), Starting on   
21 at 1136, For 1   
dose, PHASE II, PACU only  
                                                    1606 (Given - Provider:   
Hailey Vogt RN)  
                                          
   
                                                      
  
  
sodium chloride flush 0.9 %   
injection 5-40 mL  
5-40 mL, IntraVENous, PRN,   
Line Care, Starting on 21 at 1635, After   
every IV line use, Post-op  
                                                              
  
                                  No Frequency  
  
                          Medication Order 2021  
   
                                                      
  
  
scopolamine (TRANSDERM-SCOP) 1 MG/3DAYS transdermal   
patch  
Starting on 21 at 1154, For 1 dose, Becca Garay: ck override  
                                                              
  
                                  Linked Groups  
  
                                                    Order  
   
                                                      
  
  
Group 1:  
  
  
ondansetron (ZOFRAN-ODT) disintegrating tablet 4 mgJump to med  
4 mg, Oral, EVERY 8 HOURS PRN, Nausea, Vomiting, Starting on 21 at 
1635,   
Post-op  
  
   
                                                      
  
  
Or  
  
  
ondansetron (ZOFRAN) injection 4 mgJump to med  
4 mg, IntraVENous, EVERY 6 HOURS PRN, Nausea, Vomiting, Starting on 21
 at   
1635<br>Administer if oral route cannot be used.<br>Post-op  
  
  
  
  
  
  
                                                    Care Teams (unrecognized sec  
tion and content)  
   
                                          
  
  
  
                      Team Member Relationship Specialty  Start Date End Date  
   
                                                      
  
  
Cee Hand.  
  
  
[address] [place] [address]  
  
  
[phone] (Work) PCP - General   Nurse Practitioner 21           
  
  
  
                      Team Member Relationship Specialty  Start Date End Date  
   
                                                      
  
  
Cee Hand, JORADNA-CNP  
  
  
NPI: [id number]  
  
  
[address] [place] [address] [phone] (Work)  
  
  
[phone] (Fax) PCP - General                   23           
  
  
  
                      Team Member Relationship Specialty  Start Date End Date  
   
                                                      
  
  
Cee Hand APRN-CNP  
  
  
NPI: [id number]  
  
  
[address] [place] [address] [phone] (Work)  
  
  
[phone] (Fax) PCP - General                   23           
  
  
  
                      Team Member Relationship Specialty  Start Date End Date  
   
                                                      
  
  
Cee Hand APRN-CNP  
  
  
NPI: [id number]  
  
  
[address] [facility] [place] [address] [phone] (Work)  
  
  
[phone] (Fax) PCP - General                   23           
  
  
  
                      Team Member Relationship Specialty  Start Date End Date  
   
                                                      
  
  
Cee Hand APREVERETT-CNP  
  
  
NPI: [id number]  
  
  
[address] [place] [address] [phone] (Work)  
  
  
[phone] (Fax) PCP - General                   23           
  
  
  
                                                    Team Status: Active   
   
                          Member       Role         Status       Dates  
   
                          Cee Hand Primary Care Provider Active         
  
  
  
                                                    Team Status: Inactive   
   
                          Member       Role         Status       Dates  
   
                          Cee Hand Primary Care Provider Active       Sta  
rt: 2024  
End: 2024  
   
                          Joss Juares MD Attending Provider Active       Sta  
rt: 2024  
End: 2024  
  
  
  
                      Team Member Relationship Specialty  Start Date End Date  
   
                                                      
  
  
Godwin Mandel MD  
  
  
NPI: [id number]  
  
  
[address] [place] [id number] [phone] (Work)  
  
  
[phone] (Fax) PCP - General   Family Medicine 24           
   
                                                      
  
  
Cee Hand NP  
  
  
NPI: [id number]  
  
  
[address] [place] [address] [phone] (Work)  
  
  
[phone] (Fax) Nurse Practitioner Family Medicine 23            
  
  
  
  
  
                                                    REASON FOR VISIT (unrecogniz  
ed section and content)  
   
                                          
  
  
  
                                        Reason              Comments  
   
                                        Other               Colonoscopy K63.5 [id number]  
378  
  
  
  
                                        Reason              Comments  
   
                                        Error (VOID this visit)   
  
  
  
                          Specialty    Diagnoses / Procedures Referred By Contac  
t Referred To Contact  
   
                                        Gastroenterology      
  
  
Diagnoses  
  
  
Polyp of colon  
  
  
  
Procedures  
  
  
Colonoscopy Screening  
  
  
Colonoscopy Screening  
  
  
LA COLONOSCOPY FLX DX   
W/COLLJ SPEC WHEN PFRMD  
  
  
LA COLON CA SCRN NOT HI   
RSK IND  
  
  
LA COLORECTAL SCRN; HI   
RISK IND  
  
  
LA COLONOSCOPY W/BIOPSY   
SINGLE/MULTIPLE  
  
  
LA COLSC FLX W/RMVL OF   
TUMOR POLYP LESION SNARE   
TQ  
  
  
LA COLSC FLX W/REMOVAL   
LESION BY HOT BX FORCEPS                  
  
  
Ana Oreilly MD  
  
  
[address] [address]  
  
  
Phone: [phone]  
  
  
Fax: [phone]                         
  
  
  
  
  
                    Referral ID Status    Reason    Start Date Expiration Date V  
isits   
Requested                               Visits   
Authorized  
   
                                        [id number]              Pending   
Review                    2023    3/12/2024    1            1  
  
  
  
  
  
                                                    Goals (unrecognized section   
and content)  
   
                                                    Goals may be documented in a  
n alternate section  
  
FOR RECORDS PERTAINING TO PATIENTS WHO ARE OR HAVE BEEN ENROLLED IN A CHEMICAL 
DEPENDENCY/SUBSTANCEABUSE PROGRAM, SOME INFORMATION MAY BE OMITTED. This 
clinical summary was aggregated from multiple sources. Caution should be 
exercised in using it in the provision of clinical care. This summary normalizes
 information from multiple sources, and as a consequence, information in this 
document may materially change the coding, format and clinical context of 
patient data. In addition, data may be omitted in some cases. CLINICAL DECISIONS
 SHOULD BE BASED ON THE PRIMARY CLINICAL RECORDS. [email or web address] provides
 no warranty or guarantee of the accuracy or completeness of information in this
 document.

## 2025-01-15 ENCOUNTER — HOSPITAL ENCOUNTER
Dept: HOSPITAL 101 - WC | Age: 75
Discharge: HOME | End: 2025-01-15
Payer: MEDICARE

## 2025-01-15 DIAGNOSIS — S81.802A: Primary | ICD-10-CM

## 2025-01-15 PROCEDURE — G0463 HOSPITAL OUTPT CLINIC VISIT: HCPCS

## 2025-02-05 ENCOUNTER — HOSPITAL ENCOUNTER
Dept: HOSPITAL 101 - WC | Age: 75
Discharge: HOME | End: 2025-02-05
Payer: MEDICARE

## 2025-02-05 DIAGNOSIS — S81.802A: Primary | ICD-10-CM

## 2025-02-05 PROCEDURE — G0463 HOSPITAL OUTPT CLINIC VISIT: HCPCS

## 2025-02-05 NOTE — XMS_ITS
Comprehensive CCD (C-CDA v2.1)  
  
                          Created on: 2025  
  
  
Payton Bravo  
External Reference #: CDR,PersonID:[id number]  
: 1950  
Sex: Female  
  
Demographics  
  
  
                                        Address             [address] [place]  [address]  
   
                                        Home Phone          [phone]  
   
                                        Mobile Phone        [phone]  
   
                                        Preferred Language  en  
   
                                        Marital Status        
   
                                        Alevism Affiliation Unknown  
   
                                        Race                White  
   
                                        Ethnic Group        Not  or Lati  
no  
  
  
Author  
  
  
                                        Organization        [facility]  
  
  
Care Team Providers  
  
  
                                Care Team Member Name Role            Phone  
   
                                Cee Hand Primary Care Provider [phone]  
   
                                MARQUISE TIRADO Admitting       Unavailable  
   
                                MARQUISE TIRADO Attending       Unavailable  
   
                                CEE HAND Primary Care    Unavailable  
   
                                Dinesh SONI Attending       Unavailable  
   
                                CEE HAND Referring       Unavailable  
   
                                JAKOB HAND Primary Care    Unavailable  
   
                                JAKOB HAND Admitting       Unavailable  
   
                                JAKOB HAND Attending       Unavailable  
   
                                PEACE, JAKOB HOGAN Consulting      Unavailable  
   
                                REKHA MILLER    Consulting      Unavailable  
   
                                AICHHOLOSMAR, JAKOB CEE Primary Care    Unavailable  
   
                                SHELLI, DR BRODERCIK GARCIA Consulting      Unavailable  
   
                                PAIGE WALSH Attending       Unavailable  
   
                                PAIGE WALSH Admitting       Unavailable  
   
                                PAIGE WALSH Consulting      Unavailable  
   
                                AICHHOLOSMAR, JAKOB CEE Primary Care    Unavailable  
   
                                SHELLI, DR BRODERICK GARCIA Consulting      Unavailable  
   
                                PAIGE WALSH Attending       Unavailable  
   
                                PAIGE WALSH Admitting       Unavailable  
   
                                PAIGE WALSH Consulting      Unavailable  
   
                                REKHA LYNNE Admitting       Unavailable  
   
                                PEACE, JAKOB CEE Primary Care    Unavailable  
   
                                SHELLI, DR BRODERICK GARCIA Consulting      Unavailable  
   
                                REKHA LYNNE Attending       Unavailable  
   
                                REKHA LYNNE Consulting      Unavailable  
   
                                Cee Hand Unavailable     [phone]  
   
                                Unavailable     Unavailable     [phone]  
   
                                ANA OREILLY   Attending       Unavailable  
   
                                ANA OREILLY   Referring       Unavailable  
   
                                Mrs. Cee Hand Primary Care    Unavailab  
le  
   
                                CARLIN FASID   Admitting       Unavailable  
   
                                Joss Juares    Unavailable     [phone]  
   
                                Steffanyhjuana APREVERETT-Cee ROBERT Primary Care Provider  
 [phone]  
   
                                ALEKS BRAR Attending       Unavailable  
   
                                CEE HAND Primary Care    Unavailable  
   
                                ANA OREILLY   Attending       Unavailable  
   
                                ANA OREILLY   Referring       Unavailable  
   
                                AICDARVIN DENNISA JUSTYNA Primary Care    Unavailable  
   
                                Aictrevor Cee ADRIEL Primary Care Provider [phone]  
   
                                MD Joss Juares Attending Provider [phone]  
   
                                Joss Juares  Attending       Unavailable  
   
                                Joss Juares  Admitting       Unavailable  
   
                                Cee Hand Primary Care    Unavailable  
   
                                DARVIN HANDA J Referring       Unavailable  
   
                                NADGODWIN CASTAÑEDA   Primary Care    Unavailable  
   
                                AICHHOLOSMAR, CEE J Referring       Unavailable  
   
                                NADERER, GODWIN   Primary Care    Unavailable  
   
                                Aichholz NP, Cee Unavailable     [phone]  
   
                                Godwin Wallace MD Primary Care Provider [phone]  
   
                                CEE HAND  Attending       Unavailable  
   
                                STEFFANYHHOLOSMAR, CEE  Attending       Unavailable  
   
                                PEACE CEE  Attending       Unavailable  
  
  
  
Allergies  
  
  
                                                    Allergy   
Classification                          Reported   
Allergen(s)               Allergy Type              Date of   
Onset                     Reaction(s)               Facility  
   
                                                      
(1 source)                Adhesive Tape             Propensity to   
adverse   
reactions to   
drug                                      
1                         [facility]  
   
                                                      
(6 sources)         Nitrofurantoin      Drug Allergy          
1                                                   [facility]  
   
                                                      
(14 sources)                            Penicillins;   
Translations:   
[Penicillins]                           Propensity to   
adverse   
reactions to   
drug                                      
9                         [facility]  
Work Phone:   
[phone]  
   
                                                      
(1 source)                Seasonal allergy          Propensity to   
adverse   
reactions to   
substance                                 
1                                                   [facility]  
Work Phone:   
[phone]  
1  
   
                                                      
(1 source)                bee venom                 Drug allergy   
(disorder)                                
9                                                   [facility]   
Repository  
   
                                                      
(4 sources)                             Adhesive Bandages;   
Translations:   
[Adhesive   
Bandages]                               Allergy to drug   
(finding)                                                   [facility]   
[facility]  
[facility]  
Work Phone:   
[phone]  
6  
   
                                                      
(1 source)                              ALLERGIES NOT ON   
FILE;   
Translations:   
[ALLERGIES NOT ON   
FILE]                                   Propensity to   
adverse   
reactions   
(disorder)                                                  [facility]   
3 Repository  
   
                                                      
(4 sources)                             Adhesive agent;   
Translations:   
[ADHESIVE]                              Drug   
Intolerance                               
1                         [facility]  
   
                                                      
(3 sources)                             Latex;   
Translations:   
[LATEX]                                 Allergy to   
substance                                 
3                         [facility]  
Work Phone:   
[phone]  
   
                                                      
(3 sources)                             Penicillin;   
Translations:   
[PENICILLIN]              Drug Allergy                
3                         [facility]  
Work Phone:   
[phone]  
   
                                                      
(1 source)                Adhesive agent            Drug allergy   
(disorder)                                
3                                                   [facility]   
Repository  
   
                                                      
(5 sources)               Latex                     Allergy to   
substance                                 
3                         Unknown                   [facility]   
Healthcare  
   
                                                      
(5 sources)               Other                     Propensity to   
adverse   
reactions                                 
1                                                   [facility]   
Healthcare  
   
                                                      
(5 sources)                             Penicillin G   
Benzathine & Proc         Drug Allergy                
3                         Hives                     [facility]   
Healthcare  
   
                                                      
(5 sources)                             Wound Dressing   
Adhesive                  Drug Allergy                
3                         Unknown                   [facility]   
Healthcare  
  
  
  
Medications  
Current Medications  
  
  
  
                      Medication Drug Class(es) Dates      Sig (Normalized) Sig   
(Original)  
   
                                                    acetaminophen 500 mg   
oral tablet  
(1 source)                                          Start:   
2021                                          acetaminophen   
(TYLENOL) tablet   
1,000 mg  
   
                                                    acetaminophen 325 mg   
/ HYDROcodone   
bitartrate 5 mg oral   
tablet  
(5 sources)               Opioid Agonist            Start:   
2024  
End:   
2024                              take 1 tablet by   
mouth every six   
hours as needed for   
pain                                    HYDROcodone-acetami  
nophen (Norco)   
5-325 MG tablet   
TAKE 1 TABLET BY   
MOUTH EVERY 6 HOURS   
AS NEEDED FOR PAIN   
FOR 3 DAYS   
2024   
Discontinued  
  
  
  
                                Start: 2021                 HYDROcodone-ac  
etaminophen (NORCO) 5-325 MG per tablet 1   
tablet  
   
                                                    Start: 2021  
End: 2021                                     HYDROcodone-acetaminophen (N  
ORCO) 5-325 MG per tablet   
Indications: Post-op pain Take 1 tablet by mouth every 6 hours as   
needed for Pain for up to 3 days. Intended supply: 7 days. Take   
lowest dose possible to manage pain 12 tablet 0 2021 Discontinued (Stop Taking at Discharge)  
  
  
  
                                                    benzocaine 15 mg /   
menthol 3.6 mg oral   
lozenge  
(1 source)                              Standardized   
Chemical Allergen                       Start:   
2021                                          benzocaine-menthol   
(CEPACOL SORE THROAT)   
lozenge 1 lozenge  
   
                                                    12 hr cetirizine   
hydrochloride 5 mg /   
pseudoephedrine   
hydrochloride 120 mg   
extended release   
oral tablet  
(5 sources)                             alpha-Adrenergic   
Agonist,   
Histamine-1   
Receptor   
Antagonist                              Start:   
2024                              take 1   
tablet by   
mouth once   
in the   
morning,   
then take 1   
tablet by   
mouth every   
twelve   
hours at   
bedtime                                 cetirizine-pseudoephed  
rine (ZyrTEC-D) 5-120   
MG 12 hr tablet   
Indications:   
Environmental and   
seasonal allergies   
Take 1 tablet by mouth   
in the morning and 1   
tablet before bedtime.   
60 tablet 1 2024   
Active  
   
                                                    ciprofloxacin 250 mg   
oral tablet  
(2 sources)                             Quinolone   
Antimicrobial                           Start:   
2021  
End:   
2021                                          ciprofloxacin (CIPRO)   
250 MG tablet Take 1   
tablet by mouth 2   
times daily for 7 days   
Take for full 7 days   
if discharged home   
with ruff. Take for 5   
days if discharged   
home without ruff 14   
tablet 0 2021 Active  
   
                                                    docusate sodium 50   
mg / sennosides, usp   
8.6 mg oral tablet  
(3 sources)                                         Start:   
2021                              take 1   
tablet by   
mouth once   
daily                                   1 tablet, Oral,   
NIGHTLY, First dose on   
21 at 2100,   
Post-op  
  
  
  
                                                    Start: 2021  
End: 2021           take 8.6-50 mg by mouth once senna-docusate (SENOKOT S  
) 8.6-50   
MG   
per tablet Take 1 tablet by mouth   
daily 60 tablet 0 2021 Active  
  
  
  
                                                    doxycycline   
hyclate 100 mg   
oral tablet  
(2 sources)                             Tetracycline-class   
Drug                                    Start:   
2025  
End:   
2025                                          doxycycline   
(Vibra-Tabs) 100   
MG tablet   
Indications:   
Acute   
non-recurrent   
maxillary   
sinusitis Take 1   
tablet (100 mg)   
by mouth in the   
morning and 1   
tablet (100 mg)   
before bedtime.   
Do all this for 7   
days. Take with a   
full glass of   
water and do not   
lie down for at   
least 30 minutes   
after.. 14 tablet   
2025 Active  
   
                                                    0.4 ml   
enoxaparin   
sodium 100 mg/ml   
prefilled   
syringe  
(1 source)                              Low Molecular Weight   
Heparin                                 Start:   
2021                              inject 40 mg by   
subcutaneous   
injection once   
daily                                   40 mg,   
SubCUTAneous,   
DAILY, First dose   
on 21   
at 0900 Pharmacy   
to dose if renal   
insufficiency   
present.&nbsp;POD  
#1 if Hgb >8.0.   
Do not administer   
if Hgb < 8.0   
Post-op  
   
                                                    hyoscyamine   
sulfate 0.125 mg   
oral tablet  
(2 sources)                                         Start:   
2023                              take 1 tablet by   
mouth twice   
daily                                   hyoscyamine   
(Anaspaz, Levsin)   
0.125 mg tablet   
Indications:   
Encounter for   
screening for   
malignant   
neoplasm of colon   
, Polyp of colon   
Take 1 tablet   
(0.125 mg) by   
mouth 2 times a   
day. 60 tablet 1   
2023 Active  
   
                                                    ibuprofen 600 mg   
oral tablet  
(2 sources)                             Nonsteroidal   
Anti-inflammatory Drug                  Start:   
2021  
End:   
2021                              take 1 tablet by   
mouth every six   
hours as needed   
for pain                                ibuprofen   
(ADVIL;MOTRIN)   
600 MG tablet   
Take 1 tablet by   
mouth every 6   
hours as needed   
for Pain 30   
tablet 0   
2021 Active  
   
                                                    1 ml ketorolac   
tromethamine 30   
mg/ml cartridge  
(1 source)                              Nonsteroidal   
Anti-inflammatory   
Drug, Cyclooxygenase   
Inhibitor                               Start:   
2021  
End:   
2021                                          ketorolac   
(TORADOL)   
injection 15 mg  
   
                                                    loratadine 10 mg   
oral capsule  
(3 sources)                                                     loratadine 10 mg  
   
capsule Take by   
mouth. 0 Active  
  
  
  
                                                                Loratadine (CLAR  
ITIN PO) Take by mouth daily 0 Suspended  
  
  
  
                                                    magnesium hydroxide   
80 mg/ml oral   
suspension  
(1 source)                                          Start:   
2021                              take 30 mL by   
mouth once daily   
as needed for   
constipation                            30 mL, Oral, DAILY   
PRN, Constipation,   
Starting on 21 at 1635   
First line therapy   
for constipation.   
Post-op  
   
                                                    1 ml morphine   
sulfate 2 mg/ml   
cartridge  
(1 source)                Opioid Agonist            Start:   
2021                              take 2 mg by   
mouth every four   
hours as needed   
for pain                                2 mg, IntraVENous,   
EVERY 4 HOURS PRN,   
Pain Severe (7-10),   
Starting on 21 at 1635 If   
oral and IV   
narcotics ordered,   
use oral first and   
only use IV if oral   
is ineffective or   
cannot take   
oral.&amp;nbsp;&nbs  
p;Do Not give oral   
and IV within 1   
hour of each other   
unless specifically   
ordered. Post-op  
   
                                                    ondansetron 4 mg   
disintegrating oral   
tablet  
(6 sources)                             Serotonin-3   
Receptor   
Antagonist                              Start:   
2024  
End: 2024                         take 1 tablet by   
mouth every eight   
hours as needed   
for nausea and   
vomiting                                ondansetron ODT   
(Zofran-ODT) 4 MG   
disintegrating   
tablet Take 4 mg by   
mouth every 8   
(eight) hours if   
needed for nausea   
or vomiting   
2024   
Discontinued   
(Therapy completed)  
  
  
  
                                                    Start: 2021  
End: 2021                                     ondansetron (ZOFRAN) 4 MG/2M  
L   
injection  
   
                                                    Start: 2021  
End: 2021                         take 1 tablet by mouth every   
eight hours as needed for   
nausea                                  ondansetron (ZOFRAN ODT) 4 MG   
disintegrating tablet Take 1 tablet   
by mouth every 8 hours as needed for   
Nausea or Vomiting 15 tablet 0   
2021 Active  
   
                                                    Start: 2021  
End: 2021                                     ondansetron (ZOFRAN) injecti  
on 4 mg  
  
  
  
                                                    ondansetron (ZOFRAN-ODT)   
disintegrating tablet 4   
mg  
(1 source)                      Start: 2021                 ondansetron (Z  
OFRAN-ODT)   
disintegrating tablet 4   
mg  
   
                                                    polyethylene glycol 3350   
200913 mg / potassium   
chloride 2970 mg /   
sodium bicarbonate 6740   
mg / sodium chloride   
5860 mg / sodium sulfate   
29863 mg powder for oral   
solution  
(2 sources)     Osmotic Laxative Start: 2023                 PEG-3350/Elec  
trolytes   
236 GM as directed   
Orally once daily for 1   
days  Active  
   
                                                    72 hr scopolamine 0.0139   
mg/hr transdermal system  
(2 sources)               Anticholinergic           Start: 2021  
End: 2021                                     scopolamine   
(TRANSDERM-SCOP) 1   
MG/3DAYS transdermal   
patch  
  
  
  
                                Start: 2021                 scopolamine (T  
RANSDERM-SCOP) transdermal patch 1 patch  
  
  
  
                                                    5 ml sodium   
chloride 9 mg/ml   
injection  
(4 sources)                             Start: 2021   take 1 dose   
intravenously twice   
daily                                   5-40 mL, IntraVENous, EVERY 12   
HOURS SCHEDULED (2 times per   
day), First dose on 21 at 2100 For Line   
Patency: Peripheral IV = 5 mL;   
Midline or Central Line = 10   
mL/lumen.&nbsp;&nbsp;If   
following IV push medication,   
administer flush at same rate   
as the IV push. Flush volume   
is determined by type of   
infusion therapy being   
given.&nbsp;&nbsp;For   
non-viscous solutions   
use:&nbsp;Peripheral IV = 5   
mL&nbsp;Midline or Central   
Line = 10   
mL/lumen&nbsp;&nbsp;For   
viscous solutions (i.e. blood   
components, parenteral   
nutrition, contrast media, or   
after obtaining blood sample)   
use:&nbsp;Peripheral IV = 10   
mL&nbsp;Midline or Central   
Line = 20 mL/lumen Post-op  
  
  
  
                                Start: 2021                 IntraVENous, a  
t 100 mL/hr,   
CONTINUOUS, Starting on 21 at 1700, Post-op  
   
                                        Start: 2021   take 25 mL intraveno  
usly every   
hour as needed                          25 mL, IntraVENous, at 100 mL/hr,   
PRN, If patient receiving   
piggyback infusions without   
ordered maintenance IV fluids or   
with frequent/long duration   
piggyback infusions, Starting on   
21 at 1635 Administer   
at the same rate as the piggyback   
being infused. Post-op  
   
                                Start: 2021 take 5-40 mL intravenously onc  
e 5-40 mL, IntraVENous, PRN,   
Line   
Care, Starting on 21 at   
1635 After every IV line use   
Post-op  
  
  
  
                                                    tamsulosin   
hydrochloride 0.4   
mg oral capsule  
(2 sources)                             alpha-Adrenergic   
Blocker                                 Start:   
2021  
End: 2021                         take 1   
capsule by   
mouth once   
daily                                   tamsulosin   
(FLOMAX) 0.4 MG   
capsule Take 1   
capsule by mouth   
daily for 7 days   
Take only if   
discharged home   
without ruff   
and having   
difficulty with   
urination 7   
capsule 0   
2021   
Active  
   
                                                    therapeutic   
multivitamin-minera  
ls (Theragran-M)   
tablet  
(5 sources)                                                 take 1   
tablet by   
mouth once   
daily                                   therapeutic   
multivitamin-min  
erals   
(Theragran-M)   
tablet Take 1   
tablet by mouth   
Daily Active  
  
  
  
Completed/Discontinued Medications  
  
  
  
                      Medication Drug Class(es) Dates      Sig (Normalized) Sig   
(Original)  
   
                                                    acetaminophen 325 mg   
/ oxyCODONE   
hydrochloride 5 mg   
oral tablet  
(1 source)                Opioid Agonist            Start:   
2021  
End:   
2021                                          oxyCODONE-acetami  
nophen (PERCOCET)   
5-325 MG per   
tablet  
   
                                                    aprepitant 40 mg oral   
capsule  
(2 sources)                             Substance   
P/Neurokinin-1   
Receptor   
Antagonist                              Start:   
2021  
End:   
2021                                          aprepitant   
(EMEND) capsule   
40 mg  
  
  
  
                                                    Start: 2021  
End: 2021                                     aprepitant (EMEND) 40 MG cap  
armando  
  
  
  
                                                    ascorbic acid 100 mg   
oral tablet  
(1 source)          Vitamin C                               take 1 tablet   
by mouth once   
daily                                   Ascorbic Acid   
(VITAMIN C) 100 MG   
tablet Take 100 mg   
by mouth daily 0   
Suspended  
   
                                                    Bioflavonoid Products   
(BIOFLEX PO)  
(1 source)                                                      Bioflavonoid   
Products (BIOFLEX   
PO) Take by mouth   
daily With vitamin D   
0 Suspended  
   
                                                    calcium chloride   
0.0014 meq/ml /   
potassium chloride   
0.004 meq/ml / sodium   
chloride 0.103 meq/ml   
/ sodium lactate   
0.028 meq/ml   
injectable solution  
(1 source)                                          Start:   
20  
End:   
20                                                  lactated ringers   
infusion  
   
                                                    cephalexin 500 mg   
oral capsule  
(4 sources)                             Cephalosporin   
Antibacterial                           Start:   
20  
End:   
20                                      take 1 capsule   
by mouth in the   
morning, then   
take 1 capsule   
by mouth in the   
evening, then   
take 1 capsule   
by mouth at   
bedtime                                 cephalexin (Keflex)   
500 MG capsule Take   
500 mg by mouth in   
the morning and 500   
mg in the evening   
and 500 mg before   
bedtime. 2024   
Discontinued   
(Therapy completed)  
   
                                                    50 ml clindamycin 6   
mg/ml injection  
(1 source)                              Lincosamide   
Antibacterial                           Start:   
20  
End:   
20                                                  300 mg, IntraVENous,   
EVERY 8 HOURS, 2   
doses, First dose on   
21 at   
2030, Last dose on   
21 at 0430  
   
                                                    2 ml famotidine 10   
mg/ml injection  
(2 sources)                             Histamine-2 Receptor   
Antagonist                              Start:   
20  
End:   
20                                                  famotidine (PEPCID)   
injection 20 mg  
  
  
  
                                                    Start: 2021  
End: 2021                                     famotidine (PEPCID) 20 MG/2M  
L injection  
  
  
  
                                                    2 ml fentaNYL 0.05 mg/ml   
injection  
(2 sources)               Opioid Agonist            Start: 2021  
End: 2021                                     fentaNYL (SUBLIMAZE)   
injection 100 mcg  
  
  
  
                                                    Start: 2021  
End: 2021                                     fentaNYL (SUBLIMAZE) 100 MCG  
/2ML injection  
  
  
  
                                                    1 ml HYDROmorphone   
hydrochloride 1 mg/ml   
cartridge  
(3 sources)               Opioid Agonist            Start: 2021  
End: 2021                                     HYDROmorphone (DILAUDID)   
1 MG/ML injection  
  
  
  
                                                    Start: 2021  
End: 2021                                     HYDROmorphone (DILAUDID) inj  
ection 0.5 mg  
  
  
  
                                                    2 ml midazolam 1 mg/ml   
injection  
(2 sources)               Benzodiazepine            Start: 2021  
End: 2021                                     midazolam PF (VERSED)   
injection 2 mg  
  
  
  
                                                    Start: 2021  
End: 2021                                     midazolam (VERSED) 2 MG/2ML   
injection  
  
  
  
                                                    2 ml naloxone   
hydrochloride 1 mg/ml   
prefilled syringe  
(1 source)                Opioid Antagonist         Start: 2021  
End: 2021                                     naloxone (NARCAN)   
injection 1 mg  
  
  
  
                                                    Start: 2021  
End: 2021                                     naloxone (NARCAN) injection   
1 mg  
  
  
  
                                                    phenazopyridine hydrochlorid  
e   
100 mg oral tablet  
(2 sources)                                         Start: 2021  
End: 2021                                     phenazopyridine (PYRIDIUM) 1  
00   
MG tablet  
  
  
  
                                                    Start: 2021  
End: 2021                                     phenazopyridine (PYRIDIUM) t  
ablet 200 mg  
  
  
  
                                                    polyethylene glycol   
3350 091839 mg /   
potassium chloride   
1480 mg / sodium   
bicarbonate 5720 mg   
/ sodium chloride   
25529 mg powder for   
oral solution  
(4 sources)                             Osmotic   
Laxative            Start: 2023                       PEG 3350-KCl-Na   
Bicarb-NaCl 420 GM   
Oral Solution   
Reconstituted TAKE   
AS DIRECTED.   
Quantity: 1 Refills:   
0 Ordered:   
19-Sep-2023 Ana Oreilly MD Start :   
2023 Active  
   
                                                    simethicone 80 mg   
chewable tablet  
(1 source)                                          Start: 2021  
End: 2021                         take 1 tablet   
by mouth four   
times daily   
as needed                               simethicone   
(MYLICON) 80 MG   
chewable tablet Take   
1 tablet by mouth 4   
times daily as   
needed for   
Flatulence 30 tablet   
0 2021   
Discontinued (Stop   
Taking at Discharge)  
  
  
  
Problems  
Active Problems  
  
  
                      Problem Classification Problem    Date       Documented Da  
te Episodic/Chronic  
   
                                                    Allergic reactions  
(4 sources)                             Dermatitis,   
unspecified;   
Translations:   
[Allergy status to   
penicillin]                             Onset:   
2023                Episodic  
   
                                                    Asthma  
(2 sources)                             Unspecified asthma,   
uncomplicated;   
Translations:   
[Uncomplicated   
asthma]                                 Onset:   
2023                Chronic  
   
                                                    Fracture of lower limb  
(5 sources)                             Nondisplaced   
fracture of fifth   
metatarsal bone,   
left foot, initial   
encounter for closed   
fracture;   
Translations:   
[Displaced fracture   
of fifth metatarsal   
bone, left foot,   
initial encounter   
for closed fracture]                    Onset:   
2023                                          Episodic  
   
                                                    Open wounds of   
extremities  
(9 sources)                             Open wound of left   
lower leg;   
Translations:   
[Unspecified open   
wound, left lower   
leg, sequela]                           Onset:   
2024                Episodic  
   
                                                    Osteoarthritis  
(9 sources)                             Localized, primary   
osteoarthritis of   
the pelvic region   
and thigh;   
Translations:   
[Unilateral primary   
osteoarthritis,   
right hip]                              Onset:   
2024                                          Chronic  
   
                                                    Other and unspecified   
benign neoplasm  
(8 sources)                             Polyp of colon;   
Translations:   
[Benign neoplasm of   
colon]                                  2023          Episodic  
   
                                                    Other and unspecified   
benign neoplasm  
(4 sources)                             Polyp of colon;   
Translations: [Polyp   
of colon]                               Onset:   
2023                                          Episodic  
   
                                                    Other and unspecified   
benign neoplasm  
(1 source)                              Benign neoplasm of   
transverse colon;   
Translations:   
[Benign neoplasm of   
transverse colon]                       Onset:   
2023                                          Episodic  
   
                                                    Other and unspecified   
benign neoplasm  
(1 source)                              Benign neoplasm of   
colon, unspecified;   
Translations:   
[Benign neoplasm of   
colon, unspecified]                     Onset:   
2023                                          Episodic  
   
                                                    Other and unspecified   
benign neoplasm  
(1 source)                              Benign neoplasm of   
transverse colon;   
Translations:   
[Benign neoplasm of   
transverse colon]                       2023          Episodic  
   
                                                    Other and unspecified   
benign neoplasm  
(1 source)                              Benign neoplasm of   
colon; Translations:   
[Benign neoplasm of   
colon, unspecified]                     2023          Episodic  
   
                                                    Other circulatory   
disease  
(1 source)                              Raynaud's syndrome   
without gangrene;   
Translations:   
[Raynaud's syndrome   
without gangrene]                       Onset:   
07-                                          Chronic  
   
                                                    Other circulatory   
disease  
(5 sources)                             Raynaud's   
phenomenon;   
Translations:   
[Raynaud's syndrome   
without gangrene]                       Onset:   
2024                Chronic  
   
                                                    Other gastrointestinal   
disorders  
(5 sources)                             Irritable bowel   
syndrome with   
diarrhea;   
Translations:   
[Irritable bowel   
syndrome with   
diarrhea]                               Onset:   
2024                Chronic  
   
                                                    Other nervous system   
disorders  
(8 sources)                             Chronic pain;   
Translations: [Other   
chronic pain]                           Onset:   
2024                Chronic  
   
                                                    Other nervous system   
disorders  
(2 sources)                             Other chronic pain;   
Translations: [Other   
chronic pain]                                               Chronic  
   
                                                    Other nervous system   
disorders  
(1 source)                              Postoperative pain ;   
Translations: [Other   
acute postprocedural   
pain]                                                       Episodic  
   
                                                    Other non-epithelial   
cancer of skin  
(2 sources)                             Personal history of   
other malignant   
neoplasm of skin;   
Translations:   
[History of   
malignant neoplasm   
of skin]                                Onset:   
2023                Episodic  
   
                                                    Other upper   
respiratory disease  
(5 sources)                             Allergic   
disposition;   
Translations: [Other   
allergic rhinitis]                      Onset:   
2024                Chronic  
   
                                                    Other upper   
respiratory infections  
(4 sources)                             Acute maxillary   
sinusitis;   
Translations: [Acute   
maxillary sinusitis,   
unspecified]                            Onset:   
2025                Episodic  
   
                                                    Regional enteritis and   
ulcerative colitis  
(8 sources)                             Ulcerative colitis,   
unspecified, without   
complications;   
Translations:   
[Ulcerative colitis]                    Onset:   
2024                Chronic  
   
                                                    Screening and history   
of mental health and   
substance abuse codes  
(2 sources)                             Personal history of   
nicotine dependence;   
Translations:   
[Personal history of   
tobacco use]                            Onset:   
2023                Episodic  
   
                                                    Spondylosis;   
intervertebral disc   
disorders; other back   
problems  
(20 sources)                            Solitary   
sacroiliitis;   
Translations:   
[Sacroiliitis, not   
elsewhere   
classified]                             Onset:   
2024                                          Chronic  
  
  
Past or Other Problems  
  
  
                      Problem Classification Problem    Date       Documented Da  
te Episodic/Chronic  
   
                                                    Mood disorders  
(5 sources)               Mood disorders            Onset:   
2024                  
   
                                                    Other non-traumatic   
joint disorders  
(5 sources)                             Hip pain;   
Translations:   
[Pain in right   
hip]                                    Onset:   
10-                10-                Episodic  
   
                                                    Other screening for   
suspected conditions   
(not mental disorders   
or infectious disease)  
(10 sources)                            Patient encounter   
status;   
Translations:   
[Encounter for   
screening for   
malignant   
neoplasm of   
colon]                                  Onset:   
2023                Episodic  
   
                                                    Spondylosis;   
intervertebral disc   
disorders; other back   
problems  
(5 sources)                             Low back pain;   
Translations:   
[Low back pain]                         Onset:   
10-                10-                Episodic  
   
                                                    Superficial injury;   
contusion  
(4 sources)                             Contusion of left   
foot, initial   
encounter;   
Translations:   
[CONTUSION LEFT   
FOOT INITIAL ENC]                       Onset:   
01-                                          Episodic  
   
                                                    Unclassified  
(2 sources)                             Open wound of   
left lower leg                          2024            
  
  
  
Results  
  
  
                          Test Name    Value        Interpretation Reference   
[facility]                                   Facility  
   
                                                    MAMM SCREENING BILATERAL W C  
ADon 2024   
   
                                                    MAMM SCREENING   
BILATERAL W CAD                         MAMM SCREENING   
BILATERAL W CAD  
EXAM: MAMM SCREENING   
BILATERAL W CAD,   
7/10/2024 3:01 PM  
CLINICAL INDICATIONS:   
Screening, Encounter   
for screening mammogram   
for malignant neoplasm   
of breast  
COMPARISON: Baseline  
TECHNIQUE:  
Bilateral digital   
tomosynthesis MLO and   
CC views of the breasts   
were obtained, with   
creation of synthetic   
2D views. Computer   
aided detection was   
utilized.  
FINDINGS:  
There are scattered   
areas of fibroglandular   
density.  
There are no suspicious   
masses, calcifications,   
or areas of   
architectural   
distortion.  
IMPRESSION:  
No mammographic   
evidence of malignancy.  
BI-RADS: BI-RADS 1 -   
Negative  
Recommendation: Routine   
screening mammogram in   
1 year.  
Workstation:[id number]  
Finalized by Isaruo Delgaod MD on   
2024 9:08 AM  
1  
b  
MAMM 1 YR           Normal                                  [facility]  
   
                                                    CBC AND AUTO DIFFon 07-10-20  
24   
   
                      ABSOLUTE BASOPHIL 0.1 X10E9/L Normal     0.0-0.2    [facility]  
   
                                        Comment on above:   Performed By: #### C  
GILBERT, 56519-7, CMP, 86042-5 ####  
[facility] ([id number])  
[address], [address]  
[place] [address]   
   
                      ABSOLUTE NEUTROPHIL 5.3 X10E9/L Normal     1.5-6.6    [facility]  
   
                                        Comment on above:   Performed By: #### C  
BCA, 68931-0, CMP, 24307-1 ####  
[facility] ([id number])  
[address], [address]  
[place] [address]   
   
                                                    Basophils/100 WBC   
(Bld)           1.0 %           Normal                          [facility]  
   
                                        Comment on above:   Performed By: #### C  
BCA, 19013-7, CMP, 89902-9 ####  
[facility] ([id number])  
[address], [address]  
[place] [address]   
   
                                                    Eosinophils (Bld)   
[#/Vol]         0.1 10*3/uL     Normal          0.0-0.4         [facility]  
   
                                        Comment on above:   Performed By: #### C  
BCA, 99402-4, CMP, 33695-0 ####  
[facility] ([id number])  
[address], [address]  
[place] [address]   
   
                                                    Eosinophils/100 WBC   
(Bld)           0.7 %           Normal                          [facility]  
   
                                        Comment on above:   Performed By: #### CB HUNT, 11157-7, CMP, 99515-0 ####  
[facility] ([id number])  
[address], [address]  
[place] [address]   
   
                                                    Erythrocyte   
distribution width   
(RBC) [Ratio]   14.9 %          Normal          11.5-15.0       [facility]  
   
                                        Comment on above:   Performed By: #### CB HUNT, 83220-8, CMP, 95034-2 ####  
[facility] ([id number])  
[address], [facility] [address]  
[place] [address]   
   
                                                    Hematocrit (Bld)   
[Volume fraction] 42.3 %          Normal          35-47           [facility]  
   
                                        Comment on above:   Performed By: #### CB HUNT, 71044-7, CMP, 07707-7 ####  
[facility] ([id number])  
[address], [facility] [address]  
[place] [address]   
   
                                                    Hemoglobin (Bld)   
[Mass/Vol]      14.2 g/dL       Normal          11.7-15.5       [facility]  
   
                                        Comment on above:   Performed By: #### CB  
BCA, 70921-5, CMP, 57283-5 ####  
[facility] ([id number])  
[address] [address]  
[place] [address]   
   
                                                    Lymphocytes (Bld)   
[#/Vol]         1.6 10*3/uL     Normal          1.0-3.5         [facility]  
   
                                        Comment on above:   Performed By: #### CB  
BCA, 06428-8, CMP, 81502-3 ####  
[facility] ([id number])  
[address], [facility] [address]  
[place] [address]   
   
                                                    Lymphocytes/100 WBC   
(Bld)           20.9 %          Normal                          [facility]  
   
                                        Comment on above:   Performed By: #### C  
BCA, 89895-7, CMP, 62329-8 ####  
[facility] ([id number])  
[address], [address]  
[place] [address]   
   
                                                    MCH (RBC) [Entitic   
mass]           30.2 pg         Normal          27-34           [facility]  
   
                                        Comment on above:   Performed By: #### C  
BCA, 18113-2, CMP, 64768-0 ####  
[facility] ([id number])  
[address], [facility] [address]  
[place] [address]   
   
                      MCHC (RBC) [Mass/Vol] 33.7 g/dL  Normal     32-36      [facility]  
   
                                        Comment on above:   Performed By: #### CB  
BCA, 02748-1, CMP, 95232-5 ####  
[facility] ([id number])  
[address], [facility] [address]  
[place] [address]   
   
                                                    MCV (RBC) [Entitic   
vol]            90 fL           Normal                    [facility]  
   
                                        Comment on above:   Performed By: #### CB  
BCA, 29319-8, CMP, 17183-6 ####  
[facility] ([id number])  
[address], [facility] [address]  
[place] [address]   
   
                                                    Monocytes (Bld)   
[#/Vol]         0.4 10*3/uL     Normal          0-0.9           [facility]  
   
                                        Comment on above:   Performed By: #### C  
BCA, 72956-4, CMP, 99431-8 ####  
[facility] ([id number])  
[address], [facility] [address]  
[place] [address]   
   
                                                    Monocytes/100 WBC   
(Bld)           5.8 %           Normal                          [facility]  
   
                                        Comment on above:   Performed By: #### C  
BCA, 46515-4, CMP, 50420-0 ####  
[facility] ([id number])  
[address], [address]  
[place] [address]   
   
                                                    Neutrophils/100 WBC   
(Bld)           71.6 %          Normal                          [facility]  
   
                                        Comment on above:   Performed By: #### CB  
BCA, 97133-1, CMP, 62086-9 ####  
[facility] ([id number])  
[address] [address]  
[place] [address]   
   
                                                    Platelet mean volume   
(Bld) [Entitic vol] 10.2 fL         Normal          7-12            [facility]  
   
                                        Comment on above:   Performed By: #### C  
BCA, 69191-8, CMP, 72191-8 ####  
[facility] ([id number])  
[address] [address]   
   
                                                    Platelets (Bld)   
[#/Vol]         201 10*3/uL     Normal          150-450         [facility]  
   
                                        Comment on above:   Performed By: #### CB  
BCA, 89830-4, CMP, 51560-5 ####  
[facility] ([id number])  
[address] [address]   
   
                      RBC COUNT  4.70 X10E12/L Normal     3.80-5.20  [facility]  
   
                                        Comment on above:   Performed By: #### CB  
BCA, 33547-4, CMP, 16569-9 ####  
[facility] ([id number])  
[address] [address]   
   
                      WBC (Bld) [#/Vol] 7.4 10*3/uL Normal     4.0-11.0   [facility]  
   
                                        Comment on above:   Performed By: #### CB  
BCA, 87851-1, CMP, 61408-8 ####  
[facility] ([id number])  
[address] [address]  
[place] [address]   
   
                                                    COMPREHENSIVE METABOLIC PANE  
Parth 07-   
   
                      Albumin [Mass/Vol] 4.4 g/dL   Normal     3.2-5.3    [facility]  
   
                                        Comment on above:   Performed By: #### CB  
BCA, 15029-9, CMP, 88103-9 ####  
[facility] ([id number])  
[address], [address]  
[place] [address]   
   
                                                    ALP [Catalytic   
activity/Vol]   70 U/L          Normal                    [facility]  
   
                                        Comment on above:   Performed By: #### C  
BCA, 34381-5, CMP, 97634-9 ####  
[facility] ([id number])  
[address], [address]  
[place] [address]   
   
                                                    ALT [Catalytic   
activity/Vol]   12 U/L          Normal          0-31            [facility]  
   
                                        Comment on above:   Performed By: #### C  
BCA, 49482-8, CMP, 99385-6 ####  
[facility] ([id number])  
[address], [address]  
[place] [address]   
   
                      Anion gap [Moles/Vol] 11 mmol/L  Normal     5-15       [facility]  
   
                                        Comment on above:   Performed By: #### C  
BCA, 76022-7, CMP, 64960-3 ####  
[facility] ([id number])  
[address], [address]  
[place] [address]   
   
                                                    AST [Catalytic   
activity/Vol]   18 U/L          Normal          0-41            [facility]  
   
                                        Comment on above:   Performed By: #### C  
BCA, 05806-9, CMP, 63155-2 ####  
[facility] ([id number])  
[address], [address]  
[place] [address]   
   
                      Bilirubin [Mass/Vol] 0.7 mg/dL  Normal     0.3-1.2    [facility]  
   
                                        Comment on above:   Performed By: #### C  
BCA, 63320-8, CMP, 88601-9 ####  
[facility] ([id number])  
[address], [address]  
[place] [address]   
   
                      Calcium [Mass/Vol] 9.4 mg/dL  Normal     8.5-10.5   [facility]  
   
                                        Comment on above:   Performed By: #### C  
BCA, 56371-9, CMP, 26120-6 ####  
[facility] ([id number])  
[address], [address]  
[place] [address]   
   
                      Chloride [Moles/Vol] 101 mmol/L Normal          [facility]  
   
                                        Comment on above:   Performed By: #### C  
BCA, 18723-0, CMP, 85667-2 ####  
[facility] ([id number])  
[address], [address]  
[place] [address]   
   
                      CO2 [Moles/Vol] 26 mmol/L  Normal     22-32      [facility]  
   
                                        Comment on above:   Performed By: #### C  
BCA, 17015-3, CMP, 64003-3 ####  
[facility] ([id number])  
[address], [address]  
[place] [address]   
   
                      Creatinine [Mass/Vol] 0.75 mg/dL Normal     0.40-1.00  [facility]  
   
                                        Comment on above:   Result Comment: METH  
OD TRACEABLE TO IDMS STANDARD   
   
                                                            Performed By: #### C  
BCA, 91910-1, CMP, 90727-9 ####  
[facility] ([id number])  
[address], [address]  
[place] [address]   
   
                                                    GFR/1.73 sq   
M.predicted among   
non-blacks MDRD   
(S/P/Bld) [Vol   
rate/Area]      84 mL/min/{1.73_m2} Normal          >59             [facility]  
   
                                        Comment on above:   Result Comment:  
Reported eGFR is based on the  
CKD-EPI  equation that does  
not use a race coefficient.   
   
                                                            Performed By: #### C  
BCA, 20511-7, CMP, 03343-0 ####  
[facility] ([id number])  
[address], [address]  
[place] [address]   
   
                      Glucose [Mass/Vol] 88 mg/dL   Normal     65-99      [facility]  
   
                                        Comment on above:   Performed By: #### C  
BCA, 07579-1, CMP, 82318-8 ####  
[facility] ([id number])  
[address], [address]  
[place] [address]   
   
                      Potassium [Moles/Vol] 3.7 mmol/L Normal     3.5-5.0    [facility]  
   
                                        Comment on above:   Performed By: #### C  
BCA, 15065-8, CMP, 01831-6 ####  
[facility] ([id number])  
[address], [address]  
[place] [address]   
   
                      Protein [Mass/Vol] 7.1 g/dL   Normal     6.0-8.0    [facility]  
   
                                        Comment on above:   Performed By: #### C  
BCA, 00645-8, CMP, 08676-7 ####  
[facility] ([id number])  
[address], [address]  
[place] [address]   
   
                      Sodium [Moles/Vol] 138 mmol/L Normal     134-146    [facility]  
   
                                        Comment on above:   Performed By: #### CB  
BCA, 21466-4, CMP, 10037-4 ####  
[facility] ([id number])  
[address]  
[place] [address]   
   
                                                    Urea nitrogen   
[Mass/Vol]      15 mg/dL        Normal          5-27            [facility]  
   
                                        Comment on above:   Performed By: #### C  
BCA, 60845-0, CMP, 33026-8 ####  
[facility] ([id number])  
[address]  
[place] [address]   
   
                                                    ESR Photometric method (Bld)  
 [Velocity]on 07-   
   
                                                    ESR, ERYTHROCYTE   
SEDIMENTATION RATE 13 mm/h         Normal          0-30            [facility]  
   
                                        Comment on above:   Performed By: #### C  
BCA, 74255-7, CMP, 23921-4 ####  
[facility] ([id number])  
[address], [address]  
[place] [address]   
   
                                                    Nuclear Ab IA Ql (S)on 07-10  
-2024   
   
                      HIREN Screen w/reflex Negative   Normal     NEG        [facility]  
   
                                        Comment on above:   Result Comment:  
Testing performed using multiplex flow  
immunoassay. Eleven different antigens  
associated with systemic autoimmune  
diseases (dsDNA,Sm,Sm/RNP,RNP,Chromatin,  
SSA,SSB,Justyna-1,Scl70,Ribo P,Centromere B)  
are included in this screening test.   
   
                                                            Performed By: #### C  
BCA, 05427-3, CMP, 40119-3 ####  
[facility] ([id number])  
[address], [address]  
[place] [address]   
   
                                                    XR lumbar spine AP/LAT/FLX/E  
XTon 2024   
   
                                                    XR lumbar spine   
AP/LAT/FLX/EXT                          [facility] [place]  
[address] [place] [address]  
  
XRay Report  
Signed  
  
Patient:   
Payton Bravo MR#:   
  
[id number]  
: 1950   
Acct:[id number]  
  
Age/Sex: 73 / F ADM   
Date: 24  
  
Loc: XD Room: Type: [facility]  
Attending Dr: Joss Juares MD  
Copies to: Joss Juares MD  
  
  
  
Ordering Provider:   
Joss Juares MD  
Date of Service:   
24  
Accession #:   
([id number]) XR/XR   
lumbar spine   
AP/LAT/FLX/EXT: M47.817   
- Spondylosis without  
myelopathy or   
radiculopathy...  
  
  
  
  
LUMBAR SPINE WITH   
FLEXION AND EXTENSION   
VIEWS - 4 views  
  
COMPARISON: MRI   
2023  
  
CLINICAL DATA: Back and   
SI joint pain for the   
past year.  
  
Standing AP as well as   
lateral views in   
neutral, flexion and   
extension were   
obtained. There is  
osteopenia.   
Thoracolumbar   
dextroscoliotic   
curvature is again   
seen. There is   
continued concavity at  
the superior endplates   
of L1 and L3. No new   
compression fractures   
are noted. There is   
still  
approximately 5 mm of   
anterolisthesis of L4   
and L5 and 6-7 mm of   
anterolisthesis of L5   
on S1.  
Alignment does not   
change significantly   
with flexion or   
extension. There is   
disc space narrowing  
from L3-4 down. There   
is mild endplate   
spurring. There is mid   
and lower lumbar facet   
hypertrophy.  
The SI joints are   
intact. There is   
atherosclerotic plaque   
at the aorta and its   
branches.  
  
  
ORDER #: [id number]   
XR/XR lumbar spine   
AP/LAT/FLX/EXT  
IMPRESSION:  
  
OSTEOPENIA, SCOLIOSIS   
AND DEGENERATIVE   
CHANGE.  
  
OLD L1 AND L3   
COMPRESSION   
DEFORMITIES.  
  
Impression dictated by:   
Susana Wray M.D.2024 4:20 PM  
  
  
Dictation Location:   
[place] [address]  
  
  
  
Transcribed By: [facility]   
24 1620  
Dictated By: Susana Wray MD 24   
1617  
  
Signed By:  
24 1620       Normal                                  [facility]   
Physician   
Group  
   
                                                    COLONOSCOPYon 2023   
   
                                        Colonoscopy         Table formatting fro  
m   
the original result was   
not included.       Normal                                  [facility]  
   
                                                    No Panel Informationon    
   
                                                            Table formatting fro  
m   
the original result was   
not included.  
Impression  
3 subcentimeter polyps   
in the transverse colon   
were removed with cold  
forceps biopsy; placed   
1 clip successfully  
Diverticulosis in the   
sigmoid colon  
The cecum, ascending   
colon, hepatic flexure   
and descending colon   
appeared  
normal.  
  
  
Findings  
Three sessile polyps   
measuring smaller than   
5 mm in the transverse   
colon;  
performed cold forceps   
biopsy with complete en   
bloc removal; placed 1   
clip  
successfully (clip is   
MRI conditional)  
Multiple diverticula in   
the sigmoid colon  
The cecum, ascending   
colon, hepatic flexure   
and descending colon   
appeared  
normal.;  
  
  
Recommendation  
Repeat colonoscopy in 1   
year  
Personal history of   
colon polyps  
  
Sub optimal colon prep.  
  
  
  
  
Indication  
Polyp of colon  
  
Staff  
Staff Role  
No Staff Documented  
  
  
Medications  
See Anesthesia Record.  
  
Preprocedure  
A history and physical   
has been performed, and   
patient medication  
allergies have been   
reviewed. The patient's   
tolerance of previous  
anesthesia has been   
reviewed. The risks and   
benefits of the   
procedure and  
the sedation options   
and risks were   
discussed with the   
patient. All  
questions were answered   
and informed consent   
obtained.  
  
Details of the   
Procedure  
The patient underwent   
monitored anesthesia   
care, which was   
administered by  
an anesthesia   
professional. The   
patient's blood   
pressure, ECG, ETCO2,  
heart rate, level of   
consciousness, oxygen   
and respirations were   
monitored  
throughout the   
procedure. A digital   
rectal exam was   
performed. The scope  
was introduced through   
the anus and advanced   
to the cecum. The   
quality of  
bowel preparation was   
evaluated using the   
Olin Bowel   
Preparation Scale  
with scores of: right   
colon = 2, transverse   
colon = 3, left colon =   
2. The  
total BBPS score was 7.   
Bowel prep was   
adequate. The patient's   
estimated  
blood loss was minimal   
(<5 mL). The procedure   
was moderately   
difficult due  
to angulation,   
fixation, loops in the   
digestive tract and   
tortuous colon.  
In response to   
procedure difficulty,   
counter pressure was   
applied. The  
patient tolerated the   
procedure well. There   
were no apparent   
adverse  
events.  
  
Events  
Procedure Events  
Event Event Time  
ENDO SCOPE IN TIME   
2023  7:33 AM  
ENDO CECUM REACHED   
2023 7:56 AM  
ENDO SCOPE OUT TIME   
2023 8:09 AM  
  
  
Specimens  
ID Type Source Tests   
Collected by Time  
1 : Tissue COLON -   
TRANSVERSE POLYP   
SURGICAL PATHOLOGY EXAM   
Julius Goldstein MA 2023 0755  
  
  
Procedure Location  
[facility]  
[address] [address]  
  
Referring Provider  
Ana Oreilly Md  
[address] [address]  
  
Procedure Provider  
Ana Oreilly MD  
                                                            [facility]  
Work Phone:   
[phone]  
   
                                                                  [facility]  
Work Phone:   
[phone]  
   
                                                    Radiology Study   
observation   
(narrative)                                                     [facility]  
Work Phone:   
[phone]  
   
                                                    Surgical pathology studyon 1  
2023   
   
                                                    Surgical pathology   
study                                   Pathology report.total  
SEE COMMENT  
Surgical Pathology   
Case: [id number]  
Authorizing Provider:   
Ana Oreilly MD   
Collected: 2023   
075  
Ordering Location: [facility]   
Received: 2023   
0911  
Pathologist: Shawnee Acosta MD PhD  
Specimen: COLON -   
TRANSVERSE POLYP  
Path report.final   
diagnosis  
SEE COMMENT  
A. Colon, transverse,   
polyp, polypectomy:  
-Colonic mucosa with   
lymphoid aggregate(s).  
Electronically signed   
by Shawnee Acosta MD PhD on   
12/15/2023 at 4:40 PM  
Laboratory comment  
By the signature on   
this report, the   
individual or group   
listed as making the   
Final   
Interpretation/Diagnosi  
s certifies that they   
have reviewed this   
case.  
Path report.relevant Hx  
SEE COMMENT  
Encounter for screening   
for malignant neoplasm   
of colon [Z12.11]  
Polyp of colon [K63.5]  
Path report.gross   
observation  
SEE COMMENT  
A: Received in   
formalin, labeled with   
the patient's name and   
hospital number and   
 transverse polyp , are   
multiple fragments of   
tan, soft tissue   
aggregating to 0.9 x   
0.2 x 0.2 cm. The   
specimen is submitted   
in toto in one   
cassette.  
[facility]  
   
                                                    SURGICAL PATHOLOGY RESULTSon  
 2023   
   
                                        Pathology Report    Name ELIO BRAVO  
[facility]  
  
Accession #: [id number]  
Pathologist: CRUZ NAVA M.D.  
Date of Procedure:   
2023  
Date Received:   
2023  
Date Reported 2023  
Submitting Physician:   
ANA OREILLY MD  
Location: [place] Other   
External #  
  
  
  
  
  
FINAL DIAGNOSIS  
A. TRANSVERSE COLON,   
POLYP, BIOPSY:  
-- FRAGMENTS OF SESSILE   
SERRATED POLYP WITHOUT   
DYSPLASIA.  
  
  
  
  
  
  
  
  
  
  
Electronically Signed   
Out By CRUZ NAVA M.D./ROCIOP  
By the signature on   
this report, the   
individual or group   
listed as making the  
Final   
Interpretation/Diagnosi  
s certifies that they   
have reviewed this   
case.  
Diagnostic   
interpretation   
performed at [facility] [address] [place] [address]  
  
  
Clinical History:  
Physician Contact   
Number: [phone]  
Ischemic Time (A):   
11:40  
Fixative (A): Formalin  
Fixative Time (A):   
11:40  
Clinical Diagnosis   
History EMR-POLYP  
  
Specimens Submitted As:  
A: TRANSVERSE COLON   
POLYP  
  
Gross Description:  
Received in formalin,   
labeled with the   
patient's name and   
hospital number and  
 A, transverse colon   
polyp , are multiple   
fragments of tan, soft   
tissue  
aggregating to 1.0 x   
1.0 x 0.2 cm. The   
specimen is submitted   
in toto in two  
cassettes.  
AE  
  
  
aey/2023  
  
  
  
  
[facility]  
Department of Pathology  
[address] [place] [address]  
   
                                                    Colonoscopyon 2023   
   
                                                            Ana Oreilly MD -   
2023 Formatting   
of this note might be   
different from the   
original.  
Patient Name: Payton Bravo  
Procedure Date:   
2023 10:33 AM  
MRN: [id number]  
Account Number:   
[id number]  
YOB: 1950  
Admit Type: Outpatient  
Site: [facility]   
Room 1  
Ethnicity: Not    
or   
Race: White  
Attending MD: Ana Oreilly MD, [id number]  
Procedure: Colonoscopy  
Indications:   
Therapeutic procedure,   
This patient was   
referred for a  
therapeutic procedure,   
Therapeutic procedure   
for colon  
polyps, Therapeutic   
procedure for known   
colon adenoma,  
Therapeutic procedure   
for known colon polyp  
Providers: Ana Oreilly MD (Doctor),   
Papo Centeno RN  
(Nurse), Chandan Contreras,   
Technician  
Referring:  
Medicines: See the   
Anesthesia note for   
documentation of the  
administered   
medications  
Complications: No   
immediate   
complications.   
Estimated blood loss:   
None.  
Procedure:   
Pre-Anesthesia   
Assessment:  
- Prior to the   
procedure, a History   
and Physical was  
performed, and patient   
medications and   
allergies were  
reviewed. The patient's   
tolerance of previous  
anesthesia was also   
reviewed. The risks and   
benefits  
of the procedure and   
the sedation options   
and risks  
were discussed with the   
patient. All questions   
were  
answered, and informed   
consent was obtained.   
Prior  
Anticoagulants: The   
patient has taken no   
anticoagulant  
or antiplatelet agents.   
ASA Grade Assessment:   
III - A  
patient with severe   
systemic disease. After   
reviewing  
the risks and benefits,   
the patient was deemed   
in  
satisfactory condition   
to undergo the   
procedure.  
After I obtained   
informed consent, the   
scope was  
passed under direct   
vision. Throughout the   
procedure,  
the patient's blood   
pressure, pulse, and   
oxygen  
saturations were   
monitored continuously.   
The  
Colonoscope was   
introduced through the   
anus and  
advanced to the cecum,   
identified by   
appendiceal  
orifice and ileocecal   
valve. The quality of   
the bowel  
preparation was fair.  
Findings:  
A 25 mm polyp was found   
in the transverse   
colon. The polyp was   
flat.  
Preparations were made   
for mucosal resection.   
Chromoscopy with   
methylene  
blue was done to kaveh   
the borders of the   
lesion. Demarcation of   
the  
lesion was performed to   
clearly identify   
boundaries of the   
lesion. A 0.1  
mg/mL solution of   
epinephrine with   
methylene blue was   
injected to raise  
the lesion. Piecemeal   
mucosal resection using   
a snare was performed.  
Resection and retrieval   
were complete. Resected   
tissue margins were  
examined and clear of   
polyp tissue. For   
hemostasis, four   
hemostatic  
clips were successfully   
placed. There was no   
bleeding at the end of   
the  
procedure.  
The exam was otherwise   
without abnormality on   
direct and retroflexion  
views.  
Moderate Sedation:  
MAC  
Estimated Blood Loss:  
Estimated blood loss:   
none.  
Impression: -   
Preparation of the   
colon was fair.  
- One 25 mm polyp in   
the transverse colon,   
removed  
with mucosal resection.   
Resected and retrieved.   
Clips  
were placed.  
- The examination was   
otherwise normal on   
direct and  
retroflexion views.  
- Mucosal resection was   
performed. Resection   
and  
retrieval were   
complete.  
Recommendation: -   
Repeat colonoscopy in 6   
months for surveillance  
after piecemeal   
polypectomy.  
- NPO for 6 hours, then   
advacne to full liquid   
diet 2  
days as per instruction   
given to the patient  
- Await pathology   
results.  
Procedure Code(s): ---   
Professional ---  
14731, Colonoscopy,   
flexible; with   
endoscopic mucosal  
resection  
Diagnosis Code(s): ---   
Professional ---  
D12.6, Benign neoplasm   
of colon, unspecified  
K63.5, Polyp of colon  
D12.3, Benign neoplasm   
of transverse colon   
(hepatic  
flexure or splenic   
flexure)  
CPT copyright    
American Medical   
Association. All rights   
reserved.  
The codes documented in   
this report are   
preliminary and upon   
 review may  
be revised to meet   
current compliance   
requirements.  
Attending   
Participation:  
I personally performed   
the entire procedure.  
MD Ana Barrett MD  
2023 12:15:42 PM  
This report has been   
signed electronically.  
Number of Addenda: 0  
Note Initiated On:   
2023 10:33 AM  
Scope Withdrawal Time 0   
hours 45 minutes 6   
seconds  
Total Procedure   
Duration Time 1 hour 0   
minutes 27 seconds  
                                                            [facility]  
Work Phone:   
[phone]  
   
                                                    Radiology Study   
observation   
(narrative)                                                     [facility]  
Work Phone:   
[phone]  
   
                                                    ColonoscopyOrdered By: Ana Oreilly on 2023   
   
                                                                  [facility]  
Work Phone:   
[phone]  
0  
   
                                                    No Panel Informationon    
   
                                                                  [facility]  
Work Phone:   
[phone]  
6  
   
                                                            [email or web address]  
[email or web address]  
[email or web address]  
[email or web address]}                                            [facility]  
Work Phone:   
[phone]  
6  
   
                                                    Order Reconciliationon    
   
                                        Order Reconciliation Page 1  
  
Discharge   
Reconciliation Document  
  
  
  
Reconciliation Type:   
Discharge requested on   
behalf of Ana Oreilly   
(Physician)  
done by Ana Oreilly)  
  
Discharge -   
Reconciliation:   
2023 10:59 by:   
Ana Oreilly)  Normal                                  [facility]  
   
                                                    Physician Referralon   
023   
   
                                        Physician Referral  [phone]  
304  
[id number]#1.00C  
D:127               Normal                                  [facility]  
   
                                                    Physician Referralon   
023   
   
                                        Physician Referral  [phone]  
302  
[id number]#1.00C  
D:127               Normal                                  [facility]  
   
                                                    XR FOOT LT MIN 3 VIEWSon 01-  
   
   
                                        XR FOOT LT MIN 3 VIEWS EXAM: XR FOOT LT   
MIN 3   
VIEWS  
HISTORY: Contusion of   
left foot with pain  
COMPARISON: None.  
TECHNIQUE: 3 views of   
the left foot were   
obtained.  
FINDINGS: There is an   
oblique fracture   
through the distal   
shaft of the fifth  
metatarsal bone. There   
is 1 mm of overriding   
of the fracture   
fragments and very  
slight medial and   
dorsal angulation of   
the distal fracture   
fragment. The  
fracture lines do not   
extend to the joint   
space.  
There is no other   
evidence of a fracture   
or dislocation. The   
joint space  
otherwise intact.  
IMPRESSION:  
There is an oblique   
fracture involving the   
distal shaft of the   
fifth metatarsal  
bone, with minimal   
overriding and   
angulation of the   
distal fracture   
fragment.  
Electronically   
authenticated by: REKHA MILLER Date: 2023-01-10   
18:49               Normal                                  [facility]  
   
                                                    Basic Metabolic Panelon    
   
                          Anion gap [Moles/Vol] 7 mmol/L     Low          9 - 17  
   
mmol/L                                  [facility]  
   
                          Calcium [Mass/Vol] 8.6 mg/dL                 8.6 - 10.  
4   
mg/dL                                   [facility]  
   
                          Chloride [Moles/Vol] 104 mmol/L                98 - 10  
7   
mmol/L                                  [facility]  
   
                          CO2 [Moles/Vol] 26 mmol/L                 20 - 31   
mmol/L                                  [employer] [employer]  
   
                          Creatinine [Mass/Vol] 0.69 mg/dL                0.50 -  
 0.90   
mg/dL                                   [facility]  
   
                      GFR  >60                   >60 mL/min [facility]  
   
                                                    GFR Non-   
American        >60                             >60 mL/min      [facility]  
   
                                                    GFR/1.73 sq   
M.predicted MDRD   
(S/P/Bld) [Vol   
rate/Area]                                                      [employer]  
   
                                        Comment on above:   Average GFR for 70 o  
r more years old:  
75 mL/min/1.73sq m  
Chronic Kidney Disease:  
<60 mL/min/1.73sq m  
Kidney failure:  
<15 mL/min/1.73sq m  
  
  
eGFR calculated using average adult body mass. Additional eGFR   
calculator available at:  
  
http://www.[facility].[employer]/multiple_crcl_2012.htm  
  
  
   
   
                                                    GFR/1.73 sq   
M.predicted MDRD   
(S/P/Bld) [Vol   
rate/Area]      NOT REPORTED                                    [facility]  
   
                          Glucose [Mass/Vol] 177 mg/dL    High         70 - 99   
mg/dL                                   [facility]  
   
                                                    Interpretation and   
review of laboratory   
results         Abnormal                                        [employer] [employer]  
   
                          Potassium [Moles/Vol] 4.2 mmol/L                3.7 -   
5.3   
mmol/L                                  [facility]  
   
                          Sodium [Moles/Vol] 137 mmol/L                135 - 144  
   
mmol/L                                  [facility]  
   
                                                    Urea nitrogen (BldV)   
[Mass/Vol]      11 mg/dL                        8 - 23 mg/dL    [facility]  
   
                                                    Urea   
nitrogen/Creatinine   
(Bld) [Mass ratio] NOT REPORTED                                    [facility]  
   
                                                    Basic Metabolic Profon    
   
                      (cont.)               Normal                [facility]  
   
                                        Comment on above:   Result Comment: Aver  
age GFR for 70 or more years old:  
75 mL/min/1.73sq m  
Chronic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 Left side (used to   
identify  
procedures performed on   
the  
left side of the body)  
NOTE: The code number   
assigned matches the   
documented diagnosis   
and / or  
procedure in the   
patient's chart.   
However, the narrative   
phrase printed from  
the coding software may   
appear abbreviated, or   
result in slightly   
different  
terminology.  
Coded By: Katie Dickson  
Date Saved: 2019   
09:15 am            [facility]  
   
                                                    Consent Formson 2019   
   
                                        Consent Forms       [phone]  
102  
[id number]#[id number]  
[facility]  
   
                                                    History and Physicalon    
   
                                        History and Physical [phone]  
1102  
[id number]#1.00O  
[facility]  
   
                                                    Telemetry Stripson   
9   
   
                                        Telemetry Strips    [phone]  
102  
[id number]#1.00O  
[facility]  
   
                                                    .Auto Diff 1on 11-   
   
                      Auto Mono % 7 %        Normal     -12       [facility]  
   
                                        Comment on above:   Performed By: #### 7  
[id number], [id number] ####  
[facility] (DEFAULT)  
[address] [place] [address]   
   
                      Baso Abs#  0.1 x10    Normal     0.0-0.2    [facility]  
   
                                        Comment on above:   Performed By: #### 7  
[id number], [id number] ####  
[facility] (DEFAULT)  
[address] [place] [address]   
   
                                                    Basophils/100 WBC   
(Bld)           0.9 %           Normal          0.2-2.0         [facility]  
   
                                        Comment on above:   Performed By: #### 7  
[id number], [id number] ####  
[facility] (DEFAULT)  
[address] [place] [address]   
   
                      Eos Abs#   0.0 x10    Normal     0.0-0.4    [facility]  
   
                                        Comment on above:   Performed By: #### 7  
[id number], [id number] ####  
[facility] (DEFAULT)  
[address] [place] [address]   
   
                                                    Eosinophils/100 WBC   
(Bld)           0.6 %           Low             0.9-4.0         [facility]  
   
                                        Comment on above:   Performed By: #### 7  
[id number], [id number] ####  
[facility] (DEFAULT)  
[address] [place] [address]   
   
                                                    Lymphocytes (Bld)   
[#/Vol]         1.5 x10         Normal          1.3-2.9         [facility]  
   
                                        Comment on above:   Performed By: #### 7  
[id number], [id number] ####  
[facility] (DEFAULT)  
[address] [place] [address]   
   
                                                    Lymphocytes/100 WBC   
(Bld)           19 %            Normal          14-48           [facility]  
   
                                        Comment on above:   Performed By: #### 7  
[id number], [id number] ####  
[facility] (DEFAULT)  
[address] [place] [address]   
   
                      Mono Abs#  0.6 x10    Normal     0.0-0.8    [facility]  
   
                                        Comment on above:   Performed By: #### 7  
[id number], [id number] ####  
[facility] (DEFAULT)  
[address] [place] [address]   
   
                      Neut Abs#  5.9 x10    Normal     1.5-9.2    [facility]  
   
                                        Comment on above:   Performed By: #### 7  
[id number], [id number] ####  
[facility] (DEFAULT)  
[address] [place] [address]   
   
                                                    Neutrophils/100 WBC   
(Bld)           73 %            Normal          44-88           [facility]  
   
                                        Comment on above:   Performed By: #### 7  
[id number], [id number] ####  
[facility] (DEFAULT)  
[address] [place] [address]   
   
                                                    Anesthesia Noteon 11-  
   
   
                                        Anesthesia Note     Patient: PAYTON BRAVO MRN: [id number]   
FIN: [id number]  
Age: 69 years Sex:   
FEMALE : 1950  
Associated Diagnoses:   
None  
Author: Noman Cherry MD  
Preoperative   
Information  
Anesthesia history:   
Patient history: Nausea   
and vomiting with   
anesthesia.  
Review of Systems  
Constitutional:   
Negative.  
Respiratory: No   
shortness of breath.  
Cardiovascular: No   
chest pain.  
Health Status  
Allergies:  
Allergic Reactions   
(All)  
Severity Not Documented  
Latex Allergy- Rash.  
Penicillin- Hives.  
Current medications:  
Home Medications (1)   
Active  
Glucosamine &   
Chondroitin with MSM 1   
tab(s), PO, Daily  
Problem list (past   
medical history):  
All Problems  
Colitis / SNOMED CT   
[id number] / Confirmed  
Histories  
Family History:  
No family history items   
have been selected or   
recorded.  
Procedure history:  
Open reduction of   
fracture ([id number]).  
Comments:  
11/15/2019 10:37 Jodee Domingo RN  
Right leg-  
Abdominal hysterectomy   
([id number]).  
Basal cell carcinoma   
([id number]).  
Comments:  
11/15/2019 10:38 Jodee Domingo RN  
R/O on nose  
Colonoscopy   
([id number]).  
Social History  
Electronic   
Cigarette/Vaping   
Assessment  
Electronic Cigarette   
Use: Never.  
Alcohol Assessment  
Use: Current. Beer, 1-2   
times per year  
Tobacco Assessment  
Former smoker, quit   
more than 30 days ago   
Tobacco Use:. 30   
year(s).  
Comment: Quit smoking   
in - was 1 pk/day   
smoker  
.  
Social & Psychosocial   
Habits  
Alcohol  
11/15/2019 Alcohol Use:   
Current  
Type: Beer  
Frequency: 1-2 times   
per year  
Tobacco  
11/15/2019 Smoking   
tobacco use: Former   
smoker, quit more  
Number of years: 30  
Comment: Quit smoking   
in 2018- was 1 pk/day   
smoker - 11/15/2019   
10:46 - Jodee Daniels RN  
Electronic   
Cigarette/Vaping  
11/15/2019 Electronic   
Cigarette Use: Never  
.  
Physical Examination  
VS/Measurements  
Vital Signs (last 24   
hrs)_____ Last   
Charted___________  
Heart Rate Peripheral   
98 bpm (NOV 15 10:)  
Resp Rate 18 br/min   
(NOV 15 10:)  
SBP H 171mmHg (NOV 15   
10:)  
DBP 85 mmHg (NOV 15   
10:)  
SpO2 98 % (NOV 15   
10:)  
Weight 47.200 kg (NOV   
15 10:19)  
General: Alert and   
oriented, No acute   
distress.  
Airway:  
Mallampati   
classification: I (soft   
palate, fauces, uvula,   
pillars visible).  
Mouth: Within normal   
limits.  
Respiratory:   
Respirations are   
non-labored.  
Cardiovascular: Normal   
rate, Regular rhythm.  
Review / Management  
Laboratory Results  
Plan  
American Society of   
Anesthesiologists#(ASA)   
physical status   
classification: Class   
II.  
Anesthetic Preoperative   
Plan  
Anesthesia: General.  
, LMA. Anesthetic plan,   
risks, benefits, and   
alternatives discussed   
with the patient and/or   
family. Patient   
verbalized   
understanding. Informed   
consent was given.   
Consent was signed by   
the patient. Best   
friend present..   
Communication: face to   
face with patient 15   
minutes.  
[Electronically Signed   
on: 11/15/2019 12:30   
EST]  
_______________________  
_________________  
Noman Cherry MD   
[Verified on:   
11/15/2019 12:30 EST]  
_______________________  
_________________  
Noman Cherry MD Normal                                  [facility]  
   
                                                    CBC w/ Auto Diffon 11-  
9   
   
                                                    Erythrocyte   
distribution width   
(RBC) [Ratio]   14.6 %          Normal          11.5-15.0       [facility]  
   
                                        Comment on above:   Performed By: #### 7  
[id number], [id number] ####  
[facility] (DEFAULT)  
[address] [place] [address]   
   
                                                    Hematocrit (Bld)   
[Volume fraction] 42.5 %          High            33.7-40.4       [facility]  
   
                                        Comment on above:   Performed By: #### 7  
[id number], [id number] ####  
[facility] (DEFAULT)  
[address] [place] [address]   
   
                                                    Hemoglobin (Bld)   
[Mass/Vol]      13.6 g/dL       Normal          11.3-15.9       [facility]  
   
                                        Comment on above:   Performed By: #### 7  
[id number], [id number] ####  
[facility] (DEFAULT)  
[address] [place] [address]   
   
                      Man Diff?  Auto       Normal                [facility]  
   
                                        Comment on above:   Performed By: #### 7  
[id number], [id number] ####  
[facility] (DEFAULT)  
[address] [place] [address]   
   
                                                    MCH (RBC) [Entitic   
mass]           30 pg           Normal          24-34           [facility]  
   
                                        Comment on above:   Performed By: #### 7  
789730, [id number] ####  
[facility] (DEFAULT)  
[address] [place] [address]   
   
                      MCHC (RBC) [Mass/Vol] 32 g/dL    Normal     26-37      [facility]  
   
                                        Comment on above:   Performed By: #### 7  
061224, [id number] ####  
[facility] (DEFAULT)  
[address] [place] [address]   
   
                                                    MCV (RBC) [Entitic   
vol]            94 fL           Normal                    [facility]  
   
                                        Comment on above:   Performed By: #### 7  
475601, [id number] ####  
[facility] (DEFAULT)  
[address] [place] [address]   
   
                                                    Platelet mean volume   
(Bld) [Entitic vol] 10.8 fL         High            6.3-10.2        [facility]  
   
                                        Comment on above:   Performed By: #### 7  
[id number], [id number] ####  
[facility] (DEFAULT)  
[address] [place] [address]   
   
                                                    Platelets (Bld)   
[#/Vol]         252 x10         Normal          138-427         [facility]  
   
                                        Comment on above:   Performed By: #### 7  
837304, [id number] ####  
[facility] (DEFAULT)  
[address] [place] [address]   
   
                      RBC (Bld) [#/Vol] 4.52 x10   Normal     3.70-5.30  [facility]  
   
                                        Comment on above:   Performed By: #### 7  
422099, [id number] ####  
[facility] (DEFAULT)  
[address] [place] [address]   
   
                      WBC (Bld) [#/Vol] 8.1 x10    Normal     3.5-10.5   [facility]  
   
                                        Comment on above:   Performed By: #### 7  
864467, [id number] ####  
[facility] (DEFAULT)  
[address] [place] [address]   
   
                                                    Inpatient Patient Summaryon   
11-   
   
                                                    Inpatient Patient   
Summary                                 [facility]  
[address] [place], [place] [address] [phone]  
Patient Discharge   
Instructions  
Name: PAYTON BRAVO  
: 1950 MRN:   
[id number] FIN: [id number]  
Patient Address: [address] [place] [address]   
[address]  
Primary Care Provider:  
Name: Michael GARCIA  
Phone: [phone]  
After you are   
discharged if you find   
you have any questions,   
please, call   
[phone] ext [phone]   
to speak to a nurse.  
Discharge Diagnosis:   
Colles' fracture of   
left radius  
Prescription   
Information: If you   
have been given a   
prescription for   
narcotics, seek   
immediate medical   
attention if you have   
any difficulty   
breathing or any sudden   
status changes such as   
confusion and   
sleepiness.  
If you or anyone you   
know is experiencing   
suicidal thoughts,   
mental health, alcohol   
and/or drug addiction   
problems; contact the   
[facility]   
[facility]    
Crisis Hotline   
1-869.344.2823 -text   
4HZQI to 996614.  
If you received any   
narcotics, sedation, or   
any other medication   
that causes drowsiness   
for the next 24 hours,   
unless otherwise   
directed:  
? Do not drive a car.  
? Do not operate   
machinery such as power   
tools, lawn mowers,   
drills, sewing   
machines, or stoves  
? Avoid alcoholic   
beverages and drugs for   
allergies, nerves, or   
sleep  
? Do not make important   
personal or business   
decisions or sign any   
legal documents  
[facility] would   
like to thank you for   
allowing us to assist   
you with your   
healthcare needs. The   
following includes   
patient education   
materials and   
information regarding   
your injury/illness.  
PAYTON BRAVO has   
been given the   
following list of   
follow-up instructions,   
prescriptions, and   
patient education   
materials:  
Follow-up Instructions  
With: Address: When:  
SHANA JEAN BAPTISTE [address] [place] [address], [address]   
[address] [place], [place] [address] [phone] Business   
(1) 2019 9:30 AM  
With: Address: When:  
[address]   
[place] [address] [phone] Business   
(1)  
Medications  
During the course of   
your visit, your   
medication list was   
updated with the most   
current information.   
The details of those   
changes are reflected   
below:  
Medications to Continue   
That Have Not Changed  
Other Medications  
chondroitin/glucosamine  
/methylsulfonylmethane   
(Glucosamine &   
Chondroitin with MSM) 1   
tab(s) Oral every day.  
It is important to   
always keep an active   
list of medications   
available so that you   
can share with other   
providers and manage   
your medications   
appropriately. As an   
additional courtesy, we   
are also providing you   
with your final active   
medications list that   
you can keep with you.  
chondroitin/glucosamine  
/methylsulfonylmethane   
(Glucosamine &   
Chondroitin with MSM) 1   
tab(s) Oral every day.  
Take only the   
medications listed   
above. Contact your   
doctor prior to taking   
any medications not on   
this list.  
Diet & Activity  
Patient Activity Level:  
Patient Diet: Regular  
Patient Activity   
Restrictions:  
Comment:  
Patient education   
materials, if any, will   
display below  
Wrist Fracture Treated   
With ORIF  
A wrist fracture is a   
break or crack in one   
of the bones of your   
wrist. Your wrist is   
made up of eight small   
bones at the palm of   
your hand (carpal   
bones) and two long   
bones that make up your   
forearm (radius and   
ulna).  
(Inserted Image. Unable   
to display)  
If your fracture is   
displaced, that means   
that one or more parts   
of a bone have been   
moved out of normal   
position and the normal   
alignment between bones   
is destroyed. This type   
of fracture is treated   
with a surgical   
procedure that is   
called open reduction   
with internal fixation   
(ORIF). In this   
procedure, the bone   
pieces are put back   
together, and they are   
held in place by   
plates, screws, or   
other types of   
hardware. The procedure   
helps the bones to heal   
properly.  
Tell a health care   
provider about:  
? Any allergies you   
have.  
? All medicines you are   
taking, including   
vitamins, herbs, eye   
drops, creams, and   
over-the-counter   
medicines.  
? Any problems you or   
family members have had   
with anesthetic   
medicines.  
? Any blood disorders   
you have.  
? Any surgeries you   
have had.  
? Any medical   
conditions you have.  
? Whether you are   
pregnant or may be   
pregnant.  
What are the risks?  
Generally, this is a   
safe procedure.   
However, problems may   
occur, including:  
? Infection.  
? Bleeding.  
? Allergic reactions to   
medicines.  
? Damage to other   
structures or organs.  
What happens before the   
procedure?  
? Follow instructions   
from your health care   
provider about eating   
or drinking   
restrictions.  
? Ask your health care   
provider about:  
? Changing or stopping   
your regular medicines.   
This is especially   
important if you are   
taking diabetes   
medicines or blood   
thinners.  
? Taking medicines such   
as aspirin and   
ibuprofen. These   
medicines can thin your   
blood. Do not take   
these medicines before   
your procedure if your   
health care provider   
instructs you not to.  
? Plan to have someone   
take you home after the   
procedure.  
? If you will be going   
home right after the   
procedure, plan to have   
someone with you for 24   
hours.  
? Ask your health care   
provider how your   
surgical site will be   
marked or identified.  
? You may be given   
antibiotic medicine to   
help prevent infection.  
What happens during the   
procedure?  
? To reduce your risk   
of infection:  
? Your health care team   
will wash or sanitize   
their hands.  
? Your skin will be   
washed with soap.  
? An IV tube will be   
inserted into one of   
your veins.  
? You will be given one   
or more of the   
following:  
? A medicine to help   
you relax (sedative).  
? A medicine to numb   
the area (local   
anesthetic).  
? A medicine to make   
you fall asleep   
(general anesthetic).  
? A medicine that is   
injected into an area   
of your body to numb   
everything below the   
injection site   
(regional anesthetic).  
? The surgeon will make   
an incision through   
your skin to expose the   
areas of the fracture.  
? The broken bones will   
be returned to their   
normal positions. To   
hold the bones in   
place, the surgeon will   
use screws and a metal   
plate or different   
types of wiring.  
? The surgeon will   
close the incision with   
stitches (sutures) or   
staples.  
? A bandage (dressing)   
will be placed over   
your incision.  
The procedure may vary   
among health care   
providers and   
hospitals.  
What happens after the   
procedure?  
? Your blood pressure,   
heart rate, breathing   
rate, and blood oxygen   
level will be monitored   
often until the   
medicines you were   
given have worn off.  
? You will be given   
medicine as needed for   
pain.  
? Do not drive for 24   
hours if you received a   
sedative.  
? You may have physical   
therapy while you are   
in the hospital.  
This information is not   
intended to replace   
advice given to you by   
your health care   
provider. Make sure you   
discuss any questions   
you have with your   
health care provider.  
Document Released:   
2016 Document   
Revised: 2017   
Document Reviewed:   
2016  
[employer]   
Patient Education ?   
2019 [employer]  
Viruses or Bacteria  
What?s got you sick?  
Antibiotics only treat   
bacterial infections.   
Viral illnesses cannot   
be treated with   
antibiotics. When an   
antibiotic is not   
prescribed, ask your   
healthcare professional   
for tips on how to   
relieve symptoms and   
feel better.  
Usual Cause  
Illness Viruses   
Bacteria Antibiotic   
Needed  
Cold/Runny Nose NO  
Bronchitis/Chest Cold   
(in otherwise healthy   
children and adults) NO  
Whooping Cough Yes  
Flu NO  
Strep Throat Yes  
Sore Throat (except   
strep) NO  
Fluid in the middle ear   
(otitis media with   
effusion) NO  
Urinary Tract Infection   
Yes  
Antibiotics Aren?t   
Always the Answer  
www.cdc.gov/getsmart   
GET  
SMART  
Know When Antibiotics   
Work  
[place] Department of   
Health and Human   
Services  
Centers for Disease   
Control and Prevention   
2014      [facility]  
   
                                                    MAGR Intraoperative Recordon  
 11-   
   
                                                    MAGR Intraoperative   
Record                                  MAGR Intra-Op Record   
Summary  
Primary Physician:   
Cullen Mcmanus DO  
Case Number:   
[id number]  
Finalized Date/Time:   
11/15/19 14:48:08  
Pt. Name: PAYTON BRAVO  
/Sex: 1950   
FEMALE  
Med Rec #: [id number]  
Physician: Cullen Mcmanus DO  
Financial #: [id number]  
Pt. Type: D  
Room/Bed: /  
Admit/Disch: 11/15/19   
09:50:58 -  
Institution:  
Case Times MAGR  
Entry 1  
Patient  
In Room Time 11/15/19   
13:13:00 Out Room Time   
11/15/19 13:58:00  
Anesthesia  
Start Time 11/15/19   
13:13:00 Stop Time   
11/15/19 14:03:00  
Surgery  
Start Time 11/15/19   
13:35:00 Stop Time   
11/15/19 13:54:00  
Last Modified By: Nicole Puckett RN  
11/15/19 14:07:29  
Case Attendance MAGR  
Entry 1 Entry 2 Entry 3  
Case Attendee   
Cullen Mcmanus Bradley MD Long, Barbara RN Andrew DO  
Role Performed Surgeon   
- Primary   
Anesthesiologist of   
[place]  
Record  
Time In 11/15/19   
13:13:00 11/15/19   
13:13:00 11/15/19   
13:13:00  
Time Out 11/15/19   
13:58:00 11/15/19   
13:58:00 11/15/19   
13:58:00  
Procedure Percutaneous   
Pinning Percutaneous   
Pinning Percutaneous   
Pinning  
and Closed and Closed   
and Closed  
Reductio(Left, Wrist),   
Open Reduction Internal   
Fixation Wrist(Left,   
Wrist) Wrist) Wrist)  
Last Modified By: Nicoel Puckett RN, Barbara RN Long, Barbara RN  
11/15/19 14:01:06   
Entry 4 Entry 5 Entry 6  
Case Attendee Milly Yanez Leigh-Ann CST Thacker, Crystal M  
Role Performed Scrub   
Personnel First   
Assistant Radiology   
Technician  
Time In 11/15/19   
13:13:00 11/15/19   
13:13:00 11/15/19   
13:13:00  
Time Out 11/15/19   
13:58:00 11/15/19   
13:58:00 11/15/19   
13:58:00  
Procedure Percutaneous   
Pinning Percutaneous   
Pinning Percutaneous   
Pinning  
and Closed and Closed   
and Closed  
Reductio(Left, Wrist),   
Open Reduction Internal   
Fixation Wrist(Left,   
Wrist) Wrist) Wrist)  
Last Modified By: Nicole Puckett RN, Barbara RN Long, Barbara RN  
11/15/19 14:01:06   
Surgical Procedures   
MAGR  
Pre-Care Text:  
A.20 Verifies operative   
procedure, surgical   
site, and laterality   
Im.150 Develops   
individualized plan of   
care  
Entry 1 Entry 2  
Procedure Percutaneous   
Pinning Open Reduction   
Internal  
and Closed Reduction   
Fixation Wrist  
Primary Procedure Yes   
No  
Primary Surgeon   
Cullen Mcmanus James Andrew DO Marquise DO  
Modifiers Left, Wrist   
Left, Wrist  
Surgeon Comment LEFT   
WRIST FRACTURE, LEFT   
WRIST FRACTURE,  
CLOSED REDUCTION,   
CLOSED REDUCTION,  
POSSIBLE PERC PINNING,   
POSSIBLE PERC PINNING,  
POSSIBLE ORIF POSSIBLE   
ORIF  
Start 11/15/19 13:35:00   
11/15/19 13:35:00  
Stop 11/15/19 13:54:00   
11/15/19 13:54:00  
Anesthesia Type General   
General  
Surgical Service   
Orthopedics Orthopedics  
Wound Class Clean Clean  
Technique Details  
Closure Technique N/A   
N/A  
Entire procedure No No  
was performed via  
laparoscope or  
robotic assistance  
Last Modified By: Nicole Puckett RN, Barbara RN  
11/15/19 13:58:19   
11/15/19 13:58:19  
Post-Care Text:  
O.730 The patient's   
care is consistent with   
the individualized   
perioperative plan of   
care  
General Case Data MAGR  
Pre-Care Text:  
A.350.1 Classifies   
surgical wound  
Entry 1  
Case Information  
OR MAGR OR 05 Case   
Level Level 4  
Wound Class Clean   
Specialty Orthopedics  
ASA Class 2  
Diagnosis  
Preop Diagnosis LEFT   
WRIST FRACTURE Postop   
Same As Preop Yes  
Postop Diagnosis LEFT   
WRIST FRACTURE  
Blunt or No Is the   
procedure Yes  
penetrating injury   
considered  
occured prior to   
Emergent/Urgent?  
the start of the  
procedure:  
Last Modified By: Nicole Puckett RN  
11/15/19 13:48:29  
Post-Care Text:  
O.760 Patient receives   
consistent and   
comparable care   
regardless of the   
setting  
Time Out MAGR  
Entry 1  
Time out date/time   
11/15/19 13:34:00 All   
team members Yes  
have introduced  
themselves by name  
and role  
Surgeon, Yes Surgeon   
reviews Yes  
anesthesia, nurse   
critical or  
confirm patient,   
unexpected steps,  
site, procedure   
operative duration,  
anticipated blood  
loss  
Anesthesia team Yes   
Nursing team Yes  
reviews any reviews   
sterility  
patient-specific   
(including  
concerns indicator   
results)  
and equipment  
issues/concerns  
Antibiotic  
Antibiotic Yes   
Administration Time   
13:15  
prophylaxis given  
within the last 60  
minutes  
Is essential N/A  
imaging displayed?  
Last Modified By: Nicole Puckett RN  
11/15/19 13:40:01  
Patient Positioning   
MAGR  
Pre-Care Text:  
A.280 Identifies   
baseline   
musculoskeletal status   
Im.40 Positions the   
patient Im.80 Applies   
safety devices  
Entry 1  
Procedure Percutaneous   
Pinning Body Position   
Supine  
and Closed  
Reductio(Left, Wrist),  
Open Reduction Internal  
Fixation Wrist(Left,  
Wrist)  
Left Arm Position   
Extended on padded arm   
Right Arm Position   
Extended on padded arm  
board board  
Left Leg Position   
Extended Right Leg   
Position Extended  
Feet Uncrossed? Yes   
Press Points Checked   
Yes  
Positioning Device Arm   
Boards, Arm Strap,   
Outcome Met (O.80) Yes  
Pillow, Safety Strap  
Last Modified By: Nicole Puckett RN  
11/15/19 13:40:12  
Post-Care Text:  
E.290 Evaluates   
musculoskeletal status   
O.80 Patient is free   
from signs and symptoms   
of injury related to  
positioning  
Skin Prep MAGR  
Pre-Care Text:  
A.30 Verifies allergies   
Im.270 Performs skin   
preparation Im.270.1   
Implements protective   
measures to prevent  
skin and tissue injury   
due to chemical sources  
Entry 1  
Skin Prep Syntegrity  
Prep Agents (Im.270)   
Povidone-Iodine Prep By   
Nicole Puckett RN  
Prep Area (Im.270)   
Elbow and forearm, Hand   
Skin Prep Agent Dry Yes  
Without Pooling  
Hair Removal  
Syntegrity  
Hair Removal Methods No   
hair removal  
performed  
Outcome Met (O.100) Yes  
Last Modified By: Nicole Puckett RN  
11/15/19 13:48:17  
Post-Care Text:  
E.10 Evaluates for   
signs and symptoms of   
physical injury to skin   
and tissue O.100   
Patient is free from   
signs  
and symptoms of   
chemical injury  
Medication   
Administration MAGR  
Pre-Care Text:  
A.210 Identifies   
physiological status   
Im.220 Administers   
prescribed medications  
Entry 1  
Time Administered   
11/15/19 13:45:00   
Medication BACITRACIN   
OINT  
Route of Admin TOP By   
Cullen Mcmanus DO  
Outcome Met (O.130) Yes  
Last Modified By: Nicole Puckett RN  
11/15/19 13:45:57  
Post-Care Text:  
E.20 Evaluates response   
to medications O.130   
Patient receives   
appropriately   
administered   
medication(s)  
X-Rays and Images MAGR  
Pre-Care Text:  
A.240 Assesses baseline   
skin condition A.240.1   
Assesses history of   
previous radiation   
exposure Im.110  
Implements protective   
measures to prevent   
injury due to radiation   
sources  
Entry 1  
Site Wrist Site Details   
Left  
X-Ray Type C-Arm   
Outcome Met (O.110) Yes  
Last Modified By: Nicole Puckett RN  
11/15/19 13:46:18  
Post-Care Text:  
E.10 Evaluates for   
signs and symptoms of   
physical injury to skin   
and tissue O.110   
Patient is free from   
signs  
and symptoms of   
radiation injury  
Implant Log MAGR  
Pre-Care Text:  
A.20 Verifies operative   
procedure, surgical   
site, and laterality   
Im.350 Records implants   
inserted during the  
operative or invasive   
procedure  
Entry 1  
Procedure Percutaneous   
Pinning Implant Action   
Implant  
and Closed  
Reductio(Left, Wrist),  
Open Reduction Internal  
Fixation Wrist(Left,  
Wrist)  
Description MARCELINO K   
WIRE  
Implant Information  
Implant/Explant   
11/15/19 13:44:00   
Implanted/Explanted   
Cullen Mcmanus  
Date/Time By: Marquise LARKIN  
Size .-045    
MARCELINO  
Implant Usage Data  
Site Wrist L Quantity 2  
Medical Device  
Sterility  
Outcome Met (O.30) Yes  
Last Modified By: Nicole Puckett RN  
11/15/19 13:50:40  
Post-Care Text:  
E.30 Evaluates   
verification process   
for correct patient,   
site, side and level   
surgery O.30 Patient's   
procedure  
is performed on the   
correct site, side, and   
level  
Dressing/Packing MAGR  
Pre-Care Text:  
A.350 Assesses   
susceptibility for   
infection Im.290   
Administer care to   
wound sites  
Entry 1  
Skin Prep Agent Yes   
Site Wrist  
Removed Prior to  
Dressing?  
Site Details Left  
Dressing Item  
Details  
Dressing Item 4x4's,   
ABD, Roller Gauze   
Splint (Im.290)   
Fiberglass  
(Im.290)  
Outcome Met Yes  
Last Modified By: Nicole Puckett RN  
11/15/19 13:47:37  
Post-Care Text:  
E.200 Evaluates   
progress of wound   
healing O.200 Patient's   
wound perfusion is   
consistent with or   
improved from  
baseline levels  
Departure from OR MAGR  
Entry 1  
Present on Depart   
Oxygen Via Stretcher  
Post-op Destination   
PACU  
Skin DFO  
Condition Dry  
Description  
Condition Intact  
Description  
Report Given To Nicole Puckett RN  
Airway Maintenance  
Patient Status Stable   
Oxygen in Use? Yes  
Airway Device Simple   
mask Flow Rate 15 L  
Last Modified By: Nicole Puckett RN  
11/15/19 13:47:54  
Case Comments  
  
Finalized By: Nicole Puckett RN  
Document Signatures  
Signed By:  
Nicole Puckett RN   
11/15/19 14:07  
Nicole Puckett RN   
11/15/19 14:48  
Unfinalized History  
Date/Time Username   
Reason for Unfinalizing   
Freetext Reason for   
Unfinalizing  
11/15/19 14:47 MHANNA   
Modify Pick List    Normal                                  [facility] PACU Recordon 11-  
9   
   
                                        [facility] PACU Record    [facility] PACU Record   
Summary  
Primary Physician:   
Cullen Mcmanus DO  
Case Number:   
[id number]  
Finalized Date/Time:   
11/15/19 14:25:30  
Pt. Name: PAYTON BRAVO  
/Sex: 1950   
FEMALE  
Med Rec #: [id number]  
Physician: Cullen Mcmanus DO  
Financial #: [id number]  
Pt. Type: D  
Room/Bed: /  
Admit/Disch: 11/15/19   
09:50:58 -  
Institution:  
PACU Case Times MAGR  
Entry 1  
In PACU I 11/15/19   
13:59:00 Discharge from   
PACU 11/15/19 14:25:00  
I  
Last Modified By:   
Jodee Daniels RN  
11/15/19 14:25:27  
General Comments:  
Patient discharged form   
PACU using discharge   
criteria per Dr. Cherry- pt transferred   
to Phase II to [facility].  
Finalized By: Jodee Daniels RN  
Document Signatures  
Signed By:  
Jodee Daniels RN   
11/15/19 14:25      [facility]  
   
                                                    MAGR Postoperative Recordon   
11-   
   
                                                    MAGR Postoperative   
Record                                  MAGR Phase II Record   
Summary  
Primary Physician:   
Cullen Mcmanus DO  
Case Number:   
[id number]  
Finalized Date/Time:   
11/15/19 15:43:40  
Pt. Name: MAC   
PAYTON LUGO./Sex: 1950   
FEMALE  
Med Rec #: [id number]  
Physician: Cullen Mcmanus DO  
Financial #: [id number]  
Pt. Type: D  
Room/Bed: /  
Admit/Disch: 11/15/19   
09:50:58 -  
Institution:  
Phase II Case Times   
MAGR  
Pre-Care Text:  
Patient is free from   
s/s of injury. Patient   
remains free from   
compromised physical   
state related to   
surgery or  
anesthesia. Patient   
comfort maintained.   
Patient/family   
verbalize understanding   
of discharge   
instructions.  
Entry 1  
In PACU II 11/15/19   
14:29:00 Discharge from   
PACU 11/15/19 15:40:00  
II  
Last Modified By:   
Jodee Daniels RN  
11/15/19 15:43:35  
Post-Care Text:  
The patient remains   
free from s/s of   
injury. Patient's vital   
signs stable,   
circulation maintained,   
return to  
preop mental and   
physical status,   
opsite/dressing intact,   
minimal or absent   
nausea and vomiting,   
tolerates po  
intake. Patient   
verbalizes adequate   
pain control.   
Patient/family express   
understanding of   
discharge  
instructions.  
Finalized By: Jodee Daniels RN  
Document Signatures  
Signed By:  
Jodee Daniels RN   
11/15/19 15:43      [facility]  
   
                                                    MAGR Preoperative Recordon 1  
1-   
   
                                                    MAGR Preoperative   
Record                                  MAGR Pre-Op Record   
Summary  
Primary Physician:   
Cullen Mcmanus DO  
Case Number:   
[id number]  
Finalized Date/Time:   
11/15/19 13:37:06  
Pt. Name: PAYTON BRAVO/Sex: 1950   
FEMALE  
Med Rec #: [id number]  
Physician: Cullen Mcmanus DO  
Financial #: [id number]  
Pt. Type: D  
Room/Bed: /  
Admit/Disch: 11/15/19   
09:50:58 -  
Institution:  
Pre-Op Case Times MAGR  
Pre-Care Text:  
Patient will be   
optimally prepared for   
surgery. Patient is   
free from s/s of   
injury. Provide   
information to  
patient/family related   
to plan of care. Verify   
patient allergies.   
Confirm identity and   
verify consent before  
the operative or   
invasive procedure.  
Entry 1  
Patient Arrival Time   
11/15/19 10:15:00 Preop   
Departure 11/15/19   
13:10:00  
Last Modified By: Nicole Puckett RN  
11/15/19 13:37:05  
Post-Care Text:  
Patient is prepared   
mentally and physically   
and is ready for   
surgery. The patient   
remains free from s/s   
of  
injury. Patient/family   
express understanding   
of plan of care and   
participate in   
decisions affecting his   
or her  
perioperrative plan of   
care. Allergies   
documented   
appropriately. Patient   
identifiers and consent   
correct.  
General Comments:  
Patient arrives   
ambuilatory to PSW.   
Patient denies any   
recent cold/flu   
symptoms, SOB, sleep   
apnea, diabetes,  
CP, or pacemaker.  
Finalized By: Nicole Puckett RN  
Document Signatures  
Signed By:  
Nicole Puckett RN   
11/15/19 13:37      Normal                                  [facility]  
   
                                                    Operative Report - Surgeon/P  
iban 11-   
   
                                                    Operative Report -   
Surgeon/Physician                       Preoperative diagnosis:   
Colles' fracture left   
wrist  
Postoperative   
diagnosis: same  
Procedure: closed   
reduction of left   
Colles' fracture with   
percutaneous pinning  
Surgeon: JOSE Mcmanus D.O.  
Anesthesia: Gen.  
Indications for   
surgery: the patient   
had a displaced   
fracture of the distal   
radius  
Estimated blood loss:   
scant  
Complications: there   
were no comp case and  
Findings: displaced   
fracture distal radius  
Procedure summary: the   
patient was given   
general anesthesia in   
the timeout was taken.   
Provisional closed   
reduction was performed   
under fluoroscopic   
guidance. Normal   
sterilely prepped and   
draped in usual fashion   
and the reduction was   
fine tuned. Reviewed   
wrist reduced anatomic.   
I inserted 2045 K wires   
in the radial styloid   
distal to proximal   
transfixing the   
fracture. I verified   
good placement with   
fluoroscopic images and   
I took the wrist range   
of motion. The pins   
were bent 90? and   
covered with Mert's   
balls  
Bacitracin sterile   
dressings were applied.   
A very well padded   
fiberglass cast was   
loosely applied.  
[Electronically Signed   
on: 11/15/2019 15:48   
EST]  
_______________________  
_________________  
Marietta Cullen Vo DO [Verified on:   
11/15/2019 15:48 EST]  
_______________________  
_________________  
Marietta Cullen   
Marquise DO           Normal                                  [facility]  
   
                                                    Patient Handouton 11-  
   
   
                                        Patient Handout     Orthopedics  
Wrist Fracture Treated   
With ORIF  
A wrist fracture is a   
break or crack in one   
of the bones of your   
wrist. Your wrist is   
made up of eight small   
bones at the palm of   
your hand (carpal   
bones) and two long   
bones that make up your   
forearm (radius and   
ulna).  
(Inserted Image. Unable   
to display)  
If your fracture is   
displaced, that means   
that one or more parts   
of a bone have been   
moved out of normal   
position and the normal   
alignment between bones   
is destroyed. This type   
of fracture is treated   
with a surgical   
procedure that is   
called open reduction   
with internal fixation   
(ORIF). In this   
procedure, the bone   
pieces are put back   
together, and they are   
held in place by   
plates, screws, or   
other types of   
hardware. The procedure   
helps the bones to heal   
properly.  
Tell a health care   
provider about:  
? Any allergies you   
have.  
? All medicines you are   
taking, including   
vitamins, herbs, eye   
drops, creams, and   
over-the-counter   
medicines.  
? Any problems you or   
family members have had   
with anesthetic   
medicines.  
? Any blood disorders   
you have.  
? Any surgeries you   
have had.  
? Any medical   
conditions you have.  
? Whether you are   
pregnant or may be   
pregnant.  
What are the risks?  
Generally, this is a   
safe procedure.   
However, problems may   
occur, including:  
? Infection.  
? Bleeding.  
? Allergic reactions to   
medicines.  
? Damage to other   
structures or organs.  
What happens before the   
procedure?  
? Follow instructions   
from your health care   
provider about eating   
or drinking   
restrictions.  
? Ask your health care   
provider about:  
? Changing or stopping   
your regular medicines.   
This is especially   
important if you are   
taking diabetes   
medicines or blood   
thinners.  
? Taking medicines such   
as aspirin and   
ibuprofen. These   
medicines can thin your   
blood. Do not take   
these medicines before   
your procedure if your   
health care provider   
instructs you not to.  
? Plan to have someone   
take you home after the   
procedure.  
? If you will be going   
home right after the   
procedure, plan to have   
someone with you for 24   
hours.  
? Ask your health care   
provider how your   
surgical site will be   
marked or identified.  
? You may be given   
antibiotic medicine to   
help prevent infection.  
What happens during the   
procedure?  
? To reduce your risk   
of infection:  
? Your health care team   
will wash or sanitize   
their hands.  
? Your skin will be   
washed with soap.  
? An IV tube will be   
inserted into one of   
your veins.  
? You will be given one   
or more of the   
following:  
? A medicine to help   
you relax (sedative).  
? A medicine to numb   
the area (local   
anesthetic).  
? A medicine to make   
you fall asleep   
(general anesthetic).  
? A medicine that is   
injected into an area   
of your body to numb   
everything below the   
injection site   
(regional anesthetic).  
? The surgeon will make   
an incision through   
your skin to expose the   
areas of the fracture.  
? The broken bones will   
be returned to their   
normal positions. To   
hold the bones in   
place, the surgeon will   
use screws and a metal   
plate or different   
types of wiring.  
? The surgeon will   
close the incision with   
stitches (sutures) or   
staples.  
? A bandage (dressing)   
will be placed over   
your incision.  
The procedure may vary   
among health care   
providers and   
hospitals.  
What happens after the   
procedure?  
? Your blood pressure,   
heart rate, breathing   
rate, and blood oxygen   
level will be monitored   
often until the   
medicines you were   
given have worn off.  
? You will be given   
medicine as needed for   
pain.  
? Do not drive for 24   
hours if you received a   
sedative.  
? You may have physical   
therapy while you are   
in the hospital.  
This information is not   
intended to replace   
advice given to you by   
your health care   
provider. Make sure you   
discuss any questions   
you have with your   
health care provider.  
Document Released:   
2016 Document   
Revised: 2017   
Document Reviewed:   
2016  
[employer]   
Patient Education ?   
2019 [employer]  [facility]  
   
                                                    XR Fluoroscopy Up to 1 [place]  
n 11-   
   
                                                    XR Fluoroscopy Up to 1   
Hour                                    EXAMINATION: LEFT   
WRIST:  
HISTORY: ORIF LEFT   
WRIST  
COMPARISON: None.  
FLUOROSCOPY TIME:   
Fluoro time measures   
37.8 seconds and 4   
C-arm images were  
obtained and presented   
for review.  
TECHNIQUE: AP and   
lateral C-arm images of   
the left wrist.  
FINDINGS: 2 obliquely   
oriented percutaneous   
pins/wires are seen on   
image 4  
transversing a distal   
radius relatively   
nondisplaced fracture.  
IMPRESSION:  
Percutaneous wire/pin   
fixation of the distal   
radius.  
***** Final *****  
Dictated by: Nam Amato  
Dictated DT/TM:   
11/15/19 3:56  
Signed (Electronic   
Signature): Nam Amato 11/15/19 4:20   
pm  
Technologist: [facility]  
   
                                                    XR Wrist 2 Views Lefton    
   
                                        XR Wrist 2 Views Left EXAMINATION: LEFT   
WRIST:  
HISTORY: ORIF LEFT   
WRIST  
COMPARISON: None.  
FLUOROSCOPY TIME:   
Fluoro time measures   
37.8 seconds and 4   
C-arm images were  
obtained and presented   
for review.  
TECHNIQUE: AP and   
lateral C-arm images of   
the left wrist.  
FINDINGS: 2 obliquely   
oriented percutaneous   
pins/wires are seen on   
image 4  
transversing a distal   
radius relatively   
nondisplaced fracture.  
IMPRESSION:  
Percutaneous wire/pin   
fixation of the distal   
radius.  
***** Final *****  
Dictated by: Nam Amato  
Dictated DT/TM:   
11/15/19 3:56  
Signed (Electronic   
Signature): Nam Amato 11/15/19 4:20   
pm  
Technologist: [facility]  
  
  
  
Vital Signs  
  
  
                          Date Time    Vital Sign   Value        Performing   
Clinician                               Facility  
   
                                                    2025   
13:          Body height         170.2 cm            Cee Peace NP  
Work Phone:   
[phone]                          [facility]  
   
                                                    2025   
13:                              Body mass index   
(BMI) [Ratio]             18.64 kg/m2               Cee Peace NP  
Work Phone:   
[phone]                          [facility]  
   
                                                    2025   
13:          Body temperature    98.8 [degF]         Cee Peace NP  
Work Phone:   
[phone]                          [facility]  
   
                                                    2025   
13:          Body weight         53.98 kg            Cee Peace NP  
Work Phone:   
[phone]                          [facility]  
   
                                                    2025   
13:                              Diastolic blood   
pressure                  72 mm[Hg]                 Cee Hand NP  
Work Phone:   
[phone]                          [facility]  
   
                                                    2025   
13:          Heart rate          94 /min             Cee Hand NP  
Work Phone:   
[phone]                          [facility]  
   
                                                    2025   
13:          Respiratory rate    19 /min             Cee Nealholz NP  
Work Phone:   
[phone]                          [facility]  
   
                                                    2025   
13:                              SaO2% (BldA) [Mass   
fraction]                 98 %                      Cee Nealholz NP  
Work Phone:   
[phone]                          [facility]  
   
                                                    2025   
13:                              Systolic blood   
pressure                  136 mm[Hg]                Cee Aichholz NP  
Work Phone:   
[phone]                          [facility]  
   
                                                    2024   
11:          Body height         160 cm              Cee Steffanyhholz NP  
Work Phone:   
[phone]                          [facility]  
   
                                                    2024   
11:                              Body mass index   
(BMI) [Ratio]             20.87 kg/m2               Cee Nealholz NP  
Work Phone:   
[phone]                          [facility]  
   
                                                    2024   
11:          Body temperature    98.49 [degF]        Cee Steffanyhholz NP  
Work Phone:   
[phone]                          [facility]  
   
                                                    2024   
11:          Body weight         53.43 kg            Cee Steffanyhholz NP  
Work Phone:   
[phone]                          [facility]  
   
                                                    2024   
11:                              Diastolic blood   
pressure                  68 mm[Hg]                 Cee Steffanyhholz NP  
Work Phone:   
[phone]                          [facility]  
   
                                                    2024   
11:          Heart rate          84 /min             Cee Steffanyhholz NP  
Work Phone:   
[phone]                          [facility]  
   
                                                    2024   
11:          Respiratory rate    19 /min             Cee Aichholz NP  
Work Phone:   
[phone]                          [facility]  
   
                                                    2024   
11:                              SaO2% (BldA) [Mass   
fraction]                 98 %                      Cee Steffanyhholz NP  
Work Phone:   
[phone]                          [facility]  
   
                                                    2024   
11:                              Systolic blood   
pressure                  124 mm[Hg]                Cee Aichholz NP  
Work Phone:   
[phone]                          [facility]  
   
                                                    2024   
11:          Body weight         53.52 kg            Cee Hand  
Work Phone:   
[phone]                          [facility]  
   
                                                    2024   
11:                              Diastolic blood   
pressure                  80 mm[Hg]                 Cee De Jesusholosmar  
Work Phone:   
[phone]                          [facility]  
   
                                                    2024   
11:          Heart rate          77 /min             Cee De Jesusholosmar  
Work Phone:   
[phone]                          [facility]  
   
                                                    2024   
11:                              SaO2% (BldA) [Mass   
fraction]                 98 %                      Cee Hand  
Work Phone:   
[phone]                          [facility]  
   
                                                    2024   
11:                              Systolic blood   
pressure                  140 mm[Hg]                Cee De Jesusholosmar  
Work Phone:   
[phone]                          [facility]  
   
                                                    2023   
08:          Body temperature    97.2 [degF]         Ana Oreilly MD  
Work Phone:   
[phone]                          [facility]  
   
                                                    2023   
08:                              Diastolic blood   
pressure                  71 mm[Hg]                 Ana Oreilly MD  
Work Phone:   
[phone]                          [facility]  
   
                                                    2023   
08:          Heart rate          62 /min             Ana Oreilly MD  
Work Phone:   
[phone]                          [facility]  
   
                                                    2023   
08:          Respiratory rate    18 /min             Ana Oreilly MD  
Work Phone:   
[phone]                          [facility]  
   
                                                    2023   
08:                              SaO2% (BldA) [Mass   
fraction]                 98 %                      Ana Oreilly MD  
Work Phone:   
[phone]                          [facility]  
   
                                                    2023   
08:                              Systolic blood   
pressure                  130 mm[Hg]                Ana Oreilly MD  
Work Phone:   
[phone]                          [facility]  
   
                                                    2023   
06:          Body height         162.6 cm            Ana Oreilly MD  
Work Phone:   
[phone]                          [facility]  
   
                                                    2023   
06:                              Body mass index   
(BMI) [Ratio]             18.88 kg/m2               Ana Oreilly MD  
Work Phone:   
[phone]                          [facility]  
   
                                                    2023   
06:          Body weight         49.9 kg             Ana Oreilly MD  
Work Phone:   
[phone]                          [facility]  
   
                                                    2023   
14:          Body weight         52.98 kg            Joss Mor  
Other Phone:   
[phone]                           [facility]   
Professional   
Corporation  
Other Phone:   
[phone]  
   
                                                    2023   
14:                              SaO2% (BldA) [Mass   
fraction]                 98 %                      Joss Mor  
Other Phone:   
[phone]                           [facility]   
Professional   
Corporation  
Other Phone:   
[phone]  
   
                                                    2023   
10:          Body height         157.4 cm            Ana Oreilly MD  
Work Phone:   
[phone]                          [facility]  
   
                                                    2023   
10:                              Body mass index   
(BMI) [Ratio]             21.27 kg/m2               Ana Oreilly MD  
Work Phone:   
[phone]                          [facility]  
   
                                                    2023   
10:          Body weight         52.7 kg             Ana Oreilly MD  
Work Phone:   
[phone]                          [facility]  
   
                                                    2023   
08:          Body height         132.08 cm           Ana Oreilly MD  
Work Phone:   
[phone]                          [facility]  
[facility]  
Work Phone:   
[phone]  
   
                                                    2023   
08:                              Body mass index   
(BMI) [Ratio]             31.2 kg/m2                Ana Oreilly MD  
Work Phone:   
[phone]                          [facility]  
[place] [facility]  
Work Phone:   
[phone]  
   
                                                    2023   
08:                              Body surface area   
Derived from formula      1.35 m2                   Ana Oreilly MD  
Work Phone:   
[phone]                          [facility]  
[place] [facility]  
Work Phone:   
[phone]  
   
                                                    2023   
08:          Body weight         54.43 kg            Ana Oreilly MD  
Work Phone:   
[phone]                          [facility]   
[facility]  
karina CHURCHW  
Work Phone:   
[phone]  
   
                                                    2021   
11:          Body temperature    97.3 [degF]         Marquise Tirado DO  
Work Phone:   
[phone]                          [facility]  
   
                                                    2021   
11:                              Diastolic blood   
pressure                  56 mm[Hg]                 Marquise Curryak DO  
Work Phone:   
[phone]                          [facility]  
   
                                                    2021   
11:          Heart rate          61 /min             Marquise Curryak DO  
Work Phone:   
[phone]                          [facility]  
   
                                                    2021   
11:          Respiratory rate    16 /min             Marquise Tirado DO  
Work Phone:   
[phone]                          [facility]  
   
                                                    2021   
11:                              SaO2% (BldA) [Mass   
fraction]                 95 %                      Marquise Tirado DO  
Work Phone:   
[phone]                          [facility]  
   
                                                    2021   
11:                              Systolic blood   
pressure                  98 mm[Hg]                 Marquise Curryak DO  
Work Phone:   
[phone]                          [facility]  
   
                                                    2021   
23:                              Body mass index   
(BMI) [Ratio]             19.72 kg/m2               Marquise Tirado DO  
Work Phone:   
[phone]                          [facility]  
   
                                                    2021   
23:          Body weight         52.1 kg             Marquise Tirado DO  
Work Phone:   
[phone]                          [facility]  
   
                                                    2021   
11:          Body height         162.6 cm            Marquise Tirado DO  
Work Phone:   
[phone]                          [facility]  
  
  
  
Encounters  
  
  
                          Encounter Date Encounter Type Care Provider Facility  
   
                                                    Start: 2025  
End: 2025           Bamboo flowsheet          Cee Hand NP  
Work Phone:   
[phone]                          [facility]   
   
                                                    Start: 2025  
End: 2025           Bamboo flowsheet          Cee Aichholz NP  
Work Phone:   
[phone]                          [facility]  
   
                                                    Start: 2025  
End: 2025     ambulatory          CEE STEFFANYYEMIJUANA       Not Available  
   
                                                    Start: 2025  
End: 2025                         Office outpatient visit   
15 minutes                              Cee Steffanyyemijuana NP  
Work Phone:   
[phone]                          [facility]  
   
                                        Comment on above:   Open wound of left l  
ower leg, sequela (Primary Dx);  
Ulcerative colitis, unspecified, without complications (CMS/HCC);  
Acute non-recurrent maxillary sinusitis   
   
                                                    Start: 2024  
End: 2024     ambulatory          CEE NEALHOLZ       Not Available  
   
                                                    Start: 2024  
End: 2024                         Office outpatient visit   
25 minutes                              Cee Peace NP  
Work Phone:   
[phone]                          [facility]  
   
                                        Comment on above:   Open wound of left l  
ower leg, sequela (Primary Dx)   
   
                                                    Start: 07-  
End: 07-     ambulatory          CEE ADRIEL HAND     [facility]  
   
                                        Start: 2024   Patient encounter   
procedure                               Cee Peace NP  
Work Phone:   
[phone]                          [facility]  
   
                                                    Start: 2024  
End: 2024     ambulatory          CEE PEACE       Not Available  
   
                                                    Start: 2024  
End: 2024                         Patient encounter   
procedure                               Cee Peace  
Work Phone:   
[phone]                          [facility]  
Work Phone:   
[phone]  
   
                                                    Start: 2024  
End: 2024           ambulatory                Cee MOREL Steffanyyemijuana  
Work Phone:   
[phone]                          [facility]  
Work Phone:   
[phone]  
   
                                                    Start: 2024  
End: 2024                         Patient encounter   
procedure                               Cee Peace  
Work Phone:   
[phone]                          [facility]   
[facility]  
Work Phone:   
[phone]  
   
                                                    Start: 2023  
End: 2023     ambulatory          [facility]  
   
                                                    Start: 2023  
End: 2023                         Subsequent hospital   
visit by physician                      Ana Oreilly MD  
Work Phone:   
[phone]                          [facility]  
   
                                        Comment on above:   Encounter for screen  
ing for malignant neoplasm of colon   
(Primary   
Dx);  
Polyp of colon   
   
                                                    Start: 2023  
End: 2023     ambulatory          [facility]  
   
Ambulatory  
   
                                                    Start: 2023  
End: 2023           Erroneous Encounter       Aleks NIXON [facility]   
APRN-CNP  
Work Phone:   
[phone]                          [facility]  
   
                          Start: 2023 (PROC) PROCEDURE Joss Juares  [facility]  
   
                                                    Start: 2023  
End: 2023           ambulatory                Joss Juares  
Other Phone:   
[phone]                           [facility]   
Professional   
Corporation  
Other Phone:   
[phone]  
   
                                                    Start: 2023  
End: 2023           ambulatory                Joss Juares  
Other Phone:   
[phone]                           [facility]   
Professional   
Corporation  
Other Phone:   
[phone]  
   
                                        Start: 2023   Office outpatient ne  
w 45   
minutes                   Joss Juares               [facility]  
   
                                Start: 2023 AUDIT           Cee medina  
Work Phone:   
[phone]                          [facility]   
[facility]  
a [address] DO  
Work Phone:   
[phone]  
   
                                Start: 2023 Message         Cee medina  
Work Phone:   
[phone]                          [facility]   
[facility]  
[facility]  
Work Phone:   
[phone]  
   
                                Start: 2023 AUDIT           Cee Romero  
lz  
Work Phone:   
[phone]                          [facility]   
[facility]  
[facility]  
Work Phone:   
[phone]  
   
                                                    Start: 2023  
End: 2023     ambulatory          ANA OREILLY        Facility:[id number]  
   
                                                    Start: 2023  
End: 2023                         Subsequent hospital   
visit by physician                      Ana Oreilly MD  
Work Phone:   
[phone]                          [facility]  
   
                                        Comment on above:   Polyp of colon;  
Benign neoplasm of transverse colon;  
Unspecified asthma, uncomplicated;  
Dermatitis, unspecified;  
Personal history of other malignant neoplasm of skin;  
Personal history of nicotine dependence;  
Allergy status to penicillin;  
Benign neoplasm of colon, unspecified   
   
                                Start: 2023 AUDIT           Cee Justyna Jeteryemimarlo medina  
Work Phone:   
[phone]                          [facility]   
[facility]  
[facility]  
Work Phone:   
[phone]  
   
                                Start: 2023 AUDIT           Ana DOWNEY  
Work Phone:   
[phone]                          [facility]  
Work Phone:   
[phone]  
   
                                                    Start: 2023  
End: 2023     ambulatory          CNP CEE HAND   Facility:[facility]1  
   
                                                    Start: 2023  
End: 2023     ambulatory          JAKOB CEE HAND   Facility:[facility]  
   
                          Start: 2023 ambulatory   Dinesh SONI Facility  
: [facility]  
   
                                                    Start: 2023  
End: 2023     ambulatory          REKHA LYNNE  Facility:[facility]  
   
                                                    Start: 01-  
End: 2023     ambulatory          JAKOB CEE PEACE   Facility:[facility]  
   
                                                    Start: 2021  
End: 2021     ambulatory          MARQUISE TIRADO      [facility]  
   
                                                    Start: 2021  
End: 2021                         Subsequent hospital   
visit by physician                      Marquise Tirado DO  
Work Phone:   
[phone]                          [facility]   
Surg ICU  
   
                                        Comment on above:   Post-op pain (Primar  
y Dx)   
  
  
  
Procedures  
  
  
                          Date         Procedure    Procedure Detail Performing   
Clinician  
   
                                                    Start:   
2024          Mammography                             Cee Hand NP  
Work Phone:   
[phone]  
   
                                                    Start:   
2024                              X-ray of lumbar spine, four   
views                                               Cee Hand  
Work Phone:   
[phone]  
   
                                                    Start:   
2023          DISCHARGE PATIENT                       ANA CARLIN  
   
                                                    Start:   
2023          SURGICAL PATHOLOGY EXAM                     ANA OREILLY  
   
                                                    Start:   
2023                              PLACE IN OUTPATIENT/HOSPITAL   
AMBULATORY SURGERY                                  ANA MORRISARY  
   
                                                    Start:   
2023                              Colonoscopy w/biopsy   
single/multiple                                     Historical Provider   
MD  
Work Phone:   
[phone]  
   
                                                    Start:   
2023          Colonoscopy                             Ana Oreilly MD  
Work Phone:   
[phone]  
   
                                                    Start:   
2023          SURGICAL PATHOLOGY RESULTS                     Ana Oreilly MD  
Work Phone:   
[phone]  
   
                                                    Start:   
2023                              Colonoscopy stoma dx   
including collj spec spx                            [facility]  
   
                                                    Start:   
2023  
End: 2023     Colonoscopy                             Cee Hand  
Work Phone:   
[phone]  
   
                                                    Start:   
2021                              Basic metabolic panel calcium   
total                                               Melissa Shelton DO  
Work Phone:   
[phone]  
   
                                                    Start:   
2021          Glucose blood reagent strip                     Marquise badillo DO  
Work Phone:   
[phone]  
   
                                                    Start:   
2021          H/O: surgery        S/P sacrocolpopexy  Marquise Tirado DO  
Work Phone:   
[phone]  
   
                                                    Start:   
2021  
End: 2021     Cystourethroscopy                       Marquise Tirado DO  
Work Phone:   
[phone]  
   
                                                    Start:   
2021  
End: 2021                         Laparoscopy colpopexy   
suspension vaginal apex                             Marquise Tirado DO  
Work Phone:   
[phone]  
   
                                                    Start:   
2021  
End: 2021                         VAGINAL REPAIR ANTERIOR AND   
POSTERIOR                                           Marquise Tirado DO  
Work Phone:   
[phone]  
  
  
  
Plan of Treatment  
  
  
                          Date         Care Activity Detail       Author  
   
                                        Start: 2033   Screening for malign  
ant   
neoplasm of colon                                   [facility]  
   
                                        Start: 2033   Screening for malign  
ant   
neoplasm of colon                                   [facility]  
   
                                        Start: 2025   Screening for malign  
ant   
neoplasm of breast        Mammogram                 [facility]  
   
                                                    Start: 2025  
End: 2025                         Patient encounter   
procedure                               2025 10:00 AM EDT   
Office Visit [facility]   
 [address] [address]   
[phone] Cee Hand  [address]   
[place]   
[address] [phone]   
(Work) [phone]   
(Fax)                                   [facility]  
   
                                        Start: 2025   Medicare Annual   
Wellness (AWV)                          Medicare Annual   
Wellness (AWV)                          [facility]  
   
                                                    Start: 2025  
End: 2025                         Patient encounter   
procedure                               2025 2:20 PM EST   
Office Visit [facility]   
 [place] [address] [id number] [phone] Cee Hand, [address]   
[place]   
[address] [phone]   
(Work) [phone]   
(Fax)                                   [facility]  
   
                                        Start: 2024   Screening for malign  
ant   
neoplasm of [facility]  
   
                                        Start: 2023   COLONCECY, Provider:   
Ana Oreilly, Status:   
Pen, Time: 7:30 AM                      COLONCECY, Provider:   
Ana Oreilly, Status:   
Pen, Time: 7:30 AM                      [facility]  
Work Phone:   
[phone]  
   
                                                    Start: 2023  
End: 2023                         Patient encounter   
procedure                               2023 7:30 AM EST   
Appointment [facility] [id number]   
[place] [address]   
[phone] x1 Ana Oreilly  [address]   
[address] [phone]   
(Work) [phone]   
(Fax)                                   [facility]  
   
                                        Start: 2023   VIJAY, Provider  
:   
Aleks Brar,   
Status: Pen, Time:   
11:30 AM                                VIJAY, Provider:   
Aleks Brar,   
Status: Pen, Time:   
11:30 AM                                [facility]  
Work Phone:   
[phone]  
   
                                                    Start: 2023  
End: 2023                         Telemedicine   
consultation with   
patient                                 2023 11:30 AM EST   
Telemedicine [facility] [address]   
[address] [address] [phone] Aleks Brar, APRN-   
[address] [address]   
[phone] (Work)   
[phone] (Fax)                      [facility]  
   
                          Start: 2023 Influenza vaccination Influenza Vacc  
ine (#1) [facility]  
   
                                        Start: 2023   COLONANS, Provider:   
Ana Oreilly, Status:   
Pen, Time: 12:10 PM                     COLONANS, Provider:   
Ana Oreilly, Status:   
Pen, Time: 12:10 PM                     [facility]   
[facility]  
Work Phone:   
[phone]  
   
                          Start: 2021 Influenza vaccination Flu vaccine (#  
1) [facility]  
   
                                        Start: 2015   Pneumococcal Vaccine  
:   
65+ Years (1 - PCV)                     Pneumococcal Vaccine:   
65+ Years (1 - PCV)                     [facility]  
   
                                        Start: 2000   Zoster Vaccines (1 o  
f   
2)                                      Zoster Vaccines (1 of   
2)                                      [facility]  
   
                                        Start: 1990   Screening for malign  
ant   
neoplasm of breast        Mammogram                 [facility]  
   
                                        Start: 1972   DTaP/Tdap/Td Vaccine  
s   
(1 - Tdap)                              DTaP/Tdap/Td Vaccines   
(1 - Tdap)                              [facility]  
   
                          Start: 1968 Hepatitis C screening Hepatitis C Sc  
[facility]  
   
                          Start: 1962 COVID-19 Vaccine (1) COVID-19 Vaccin  
e (1) [facility]  
   
                          Start: 1951 COVID-19 Vaccine (#1) COVID-19 Vacci  
ne (#1) [facility]  
   
                          Start: 1950 Lipid panel  Lipid Panel  [facility]  
   
                                        Start: 1950   Medicare Annual   
Wellness Visit                          Medicare Annual   
Wellness Visit (AWV)                    [facility]  
   
                                        Start: 1950   Screening for malign  
ant   
neoplasm of colon                                   [facility]  
   
                                        Start: 1950   Screening for   
osteoporosis              Bone Density Scan         [facility]  
   
                          Start: 1950 Yearly Adult Physical Yearly Adult P  
hysical [facility]  
   
                                                      
End: 2023           Moderate Sedation         Moderate Sedation   
Procedures Routine Once   
for 1 Occurrences   
starting 2023   
until 2023                        [facility]  
Work Phone:   
[phone]  
   
                                        Comment on above:   Once for 1 Occurrenc  
es starting 2023 until 2023   
   
                                                Nasal Cannula Oxygen Nasal Cannu  
la Oxygen   
Respiratory Care   
Routine Daily until   
discontinued starting   
2021                              [facility] Phone:   
[phone]  
   
                                        Comment on above:   Daily until disconti  
nued starting 2021   
   
                                                            Oxygen therapy [Mini  
mum   
Data Set]                               Initiate Oxygen Therapy   
Protocol Respiratory   
Care Routine Daily   
until discontinued   
starting 2021                     [facility] Phone:   
[phone]  
   
                                        Comment on above:   Daily until disconti  
nued starting 2021   
   
                                                      
End: 2023                         Pulse oximetry,   
continuous                              Pulse oximetry,   
continuous Respiratory   
Care Routine Continuous   
until discontinued   
starting 2023                     [facility]  
Work Phone:   
[phone]  
   
                                        Comment on above:   Continuous until dis  
continued starting 2023   
   
                                                      
End: 2023           Pulse oximetry, spot      Pulse oximetry, spot   
Respiratory Care   
Routine Once for 1   
Occurrences starting   
2023 until   
2023                              [facility]  
Work Phone:   
[phone]  
   
                                        Comment on above:   Once for 1 Occurrenc  
es starting 2023 until 2023   
   
                                                Spirometry panel Incentive jb  
metry   
Respiratory Care   
Routine Every 1hr while   
awake until   
discontinued starting   
2021                              [facility] Phone:   
[phone]  
   
                                        Comment on above:   Every 1hr while awak  
e until discontinued starting 2021   
   
                                                Surgical Pathology Surgical Path  
ology Lab   
Routine Release Upon   
Ordering for 1   
Occurrences starting   
2021                              [facility] Phone:   
[phone]  
   
                                        Comment on above:   Release Upon Orderin  
g for 1 Occurrences starting 2021   
   
                                                      
End: 2021                         SURGICAL PATHOLOGY   
REPORT                                  SURGICAL PATHOLOGY   
REPORT Lab Routine Once   
for 1 Occurrences   
starting 2021   
until 2021                        [facility] Phone:   
[phone]  
   
                                        Comment on above:   Once for 1 Occurrenc  
es starting 2021 until 2021   
   
                                                            Surgical pathology   
study                                   Surgical Pathology Exam   
Pathology and Cytology   
Timed Encounter for   
screening for malignant   
neoplasm of colon Polyp   
of colon Release Upon   
Ordering for 1   
Occurrences starting   
2023                              [facility]  
Work Phone:   
[phone]  
   
                                        Comment on above:   Release Upon Orderin  
g for 1 Occurrences starting 2023   
  
  
  
Immunizations  
  
  
                      Immunization Date Immunization Notes      Care Provider Anitra lake  
   
                                        2024          tetanus toxoid, redu  
lopez   
diphtheria toxoid, and   
acellular pertussis   
vaccine, adsorbed                                   Cee Hand NP  
Work Phone:   
[phone]                          [facility]  
  
  
  
Payers  
  
  
                          Date         Payer Category Payer        Policy ID  
   
                          2025   Unknown                   [id number]  
   
                          2024   Self-pay                    
   
                          2023   Private Health Insurance              [id number]  
[id number]  
[id number]  
   
                          05-   Unknown                     
   
                          2015   Medicare                  [id number]  
[id number]  
[id number]  
   
                          1960   Medicare                  [id number]   
[id number]  
[id number]  
   
                          1960   Unknown                   [id number]   
[id number]  
[id number]  
   
                          1950   Unknown                   [id number]   
[id number]  
[id number]  
   
                          1950   Unknown                   [id number]   
[id number]  
[id number]  
   
                          1950   Unknown                   [id number]   
[id number]  
[id number]  
   
                          1950   Unknown                   [id number]   
[id number]  
[id number]  
   
                          1950   Unknown                   [id number]   
[id number]  
[id number]  
   
                          1950   Unknown                   [id number]   
[id number]  
[id number]  
   
                          1950   Unknown                   [id number]   
[id number]  
[id number]  
   
                          1950   Unknown                   [id number]   
[id number]  
[id number]  
   
                          1950   Unknown                   [id number]   
[id number]  
[id number]  
   
                          1950   Unknown                   [id number]   
[id number]  
[id number]  
   
                          1950   Unknown                   [id number]   
[id number]  
[id number]  
   
                          1950   Unknown                   [id number]   
[id number]  
[id number]  
   
                          1950   Unknown                   [id number]   
[id number]  
[id number]  
   
                          1950   Unknown                   [id number]   
[id number]  
[id number]  
   
                                       Private Health Insurance [facility] [id number]   
[id number]  
[id number]  
   
                                       Unknown                   [id number]   
[id number]  
[id number]  
  
  
  
Social History  
  
  
                          Date         Type         Detail       Facility  
   
                                                    Start:   
2021  
End: 2023                         Tobacco smoking status   
[facility]                      Ex-smoker                 [facility] Phone:   
[phone]  
   
                                                      
End: 2015     History of tobacco use Current smoker      [facility] Phone:   
[phone]  
   
                                                      
End: 2015     History of tobacco use Cigarette Smoker    [facility] Phone:   
[phone]  
   
                                                    Start:   
2021  
End: 2023           Tobacco use and exposure  Smokeless tobacco   
non-user                                [facility] Phone:   
[phone]  
   
                                                    Start:   
2021  
End: 2023     Alcohol intake      Ex-drinker (finding) [facility] Phone:   
[phone]  
   
                                                    Start:   
2021                              History SDOH Alcohol   
Comment                   socially                  [facility] Phone:   
[phone]  
   
                                                    Start:   
1950          Sex Assigned At Birth Not on file         [facility] Phone:   
[phone]  
   
                                                    Start:   
2023  
End: 2023                         Exposure to SARS-CoV-2   
(event)                   Not sure                  [facility] Phone:   
[phone]  
   
                                                    Start:   
2023  
End: 2025     Sex Assigned At Birth                     [facility]  
   
                                                            Tobacco smoking stat  
us   
NHIS                                    Tobacco smoking   
consumption unknown                     [facility]  
Work Phone:   
[phone]  
   
                                                    Start:   
2023  
End: 2023                         Tobacco smoking status   
NHIS                      Never smoked tobacco      [facility]  
Work Phone:   
[phone]  
   
                                                    Start:   
2023  
End: 2025                         History of Social   
function                                            [facility]  
   
                                                    Start:   
1950          Sex Assigned At Birth Female              [facility]  
   
                                                    Start:   
2024  
End: 2025           Alcoholic beverage intake Current drinker of   
alcohol (finding)                       [facility]  
   
                                                    Start:   
2023          Alcohol Comment     2-4 times a month.  [facility]  
   
                                                            How often do you nee  
d to   
have someone help you   
when you read   
instructions, pamphlets,   
or other written material   
from your doctor or   
pharmacy [SILS]                         Patient declines to   
respond                                 [facility]  
   
                                                            Do you belong to any  
   
clubs or organizations   
such as Anglican groups,   
unions, fraternal or   
athletic groups, or   
school groups?            Yes                       [facility]  
   
                                                            Are you now ,  
   
, ,   
, never    
or living with a partner?                    [facility]  
  
  
  
Medical Equipment  
  
  
                                Procedure Code  Equipment Code  Equipment Origin  
al   
Text                      Equipment Identifier      Dates  
   
                                                    Restorelle Y [id number]  
[id number]   
[id number] FDA                          Start:   
2021  
  
  
  
Clinical Notes 2021 to 2025  
Cee Hand NP - 2025 3:47 PM Jay Jay Hand NP - 2025 1:20 PM  
 Jay Jay Hand NP - 2025 6:29 AM Jay Jay Hand NP - 2025 
6:27 AM ESTPatient Instructions  
  
                                Note Date & Type Note            Facility  
   
                                                    2025 History of Presen  
t   
illness Narrative                       Associated Problem(s): Acute   
non-recurrent maxillary   
sinusitis  
Formatting of this note might   
be different from the   
original.  
Sxs present for 5 days, and   
worsening, green nasal   
drainage and sinus pressure   
and HA  
Finish atb, fluids and rest,   
fu if not better  
Electronically signed by Cee Hand NP at 2025   
3:47 PM EST  
Electronically signed by Cee Hand NP at 2025   
3:48 PM EST  
Formatting of this note is   
different from the original.  
Images from the original note   
were not included.  
  
Payton Bravo is a 74   
y.o. female presents with   
chief complaint of Wound   
Check (Left leg)  
  
HPI:  
  
Wound Check  
She was originally treated   
more than 14 days ago.   
Previous treatment included   
oral antibiotics and wound   
cleansing or irrigation.   
There has been no drainage   
from the wound. There is no   
redness present. There is no   
swelling present. There is no   
pain present. She has no   
difficulty moving the   
affected extremity or digit.  
Sinusitis  
This is a new problem. The   
current episode started in   
the past 7 days. The problem   
has been gradually worsening   
since onset. There has been   
no fever. Associated symptoms   
include headaches and sinus   
pressure. Pertinent negatives   
include no chills, congestion   
(green), coughing, ear pain,   
shortness of breath or sore   
throat. Past treatments   
include nothing.  
  
  
SUBJECTIVE:  
  
MEDICATIONS:  
Current Outpatient   
Medications  
Medication Instructions  
cetirizine-pseudoephedrine   
(ZyrTEC-D) 5-120 MG 12 hr   
tablet 1 tablet, Oral, 2   
times daily  
doxycycline (VIBRA-TABS) 100   
mg, Oral, 2 times daily, Take   
with a full glass of water   
and do not lie down for at   
least 30 minutes after.  
therapeutic   
multivitamin-minerals   
(Theragran-M) tablet 1   
tablet, Daily  
  
  
ALLERGIES:  
Allergies  
Allergen Reactions  
Latex Unknown  
Nitrofurantoin Monohyd Macro  
Other  
Penicillin G Benzathine &   
Proc Hives  
Wound Dressing Adhesive   
Unknown  
Penicillins Rash  
Other Reaction(s): Unknown  
  
  
REVIEW OF SYMPTOMS:  
  
Review of Systems  
Constitutional: Negative for   
appetite change, chills and   
fever.  
HENT: Positive for sinus   
pressure. Negative for   
congestion (green), ear pain   
and sore throat.  
Eyes: Negative for pain,   
discharge, redness and visual   
disturbance.  
Respiratory: Negative for   
cough, shortness of breath   
and wheezing.  
Cardiovascular: Negative for   
chest pain, palpitations and   
leg swelling.  
Gastrointestinal: Negative   
for abdominal pain, blood in   
stool, constipation,   
diarrhea, nausea and   
vomiting.  
Genitourinary: Negative for   
difficulty urinating, dysuria   
and frequency.  
Musculoskeletal: Negative for   
arthralgias, back pain, joint   
swelling and myalgias.  
Skin: Positive for wound.   
Negative for rash.  
Neurological: Positive for   
headaches. Negative for   
dizziness, tremors, seizures   
and syncope.  
Psychiatric/Behavioral:   
Negative for behavioral   
problems, self-injury and   
suicidal ideas. The patient   
is not nervous/anxious.  
Hematological: Does not   
bruise/bleed easily.  
Endocrine: Negative for   
polydipsia, polyphagia and   
polyuria.  
Allergic/Immunologic:   
Negative for environmental   
allergies and food allergies.  
  
  
PAST MEDICAL HISTORY  
  
Past Medical History:  
Diagnosis Date  
Allergies  
Anemia  
Arthritis  
IBS (irritable bowel   
syndrome)  
Ulcerative colitis (CMS/HCC)  
Uterine cancer (CMS/HCC)  
  
  
Past Surgical History:  
Procedure Laterality Date  
APPENDECTOMY   
HYSTERECTOMY   
ORIF WRIST FRACTURE Left   
11/15/2019  
  
family history is not on   
file.  
  
OBJECTIVE:  
  
Visit Vitals  
/72 (BP Location: Left   
arm, Patient Position:   
Sitting, BP Cuff Size: Adult   
long)  
Pulse 94  
Temp 98.8 F (Temporal)  
Resp 19  
Ht 5' 7   
Wt 119 lb  
SpO2 98%  
BMI 18.64 kg/m  
Smoking Status Never  
BSA 1.6 m  
  
  
Physical Exam  
Vitals and nursing note   
reviewed.  
Constitutional:  
General: She is not in acute   
distress.  
Appearance: Normal   
appearance.  
HENT:  
Head: Normocephalic and   
atraumatic.  
Right Ear: Tympanic membrane,   
ear canal and external ear   
normal.  
Left Ear: Tympanic membrane,   
ear canal and external ear   
normal.  
Nose: Congestion   
(green/yellow) present. No   
rhinorrhea.  
Comments: Sinus pressure  
Mouth/Throat:  
Mouth: Mucous membranes are   
moist.  
Eyes:  
Extraocular Movements:   
Extraocular movements intact.  
Conjunctiva/sclera:   
Conjunctivae normal.  
Cardiovascular:  
Rate and Rhythm: Normal rate   
and regular rhythm.  
Pulses: Normal pulses.  
Heart sounds: Normal heart   
sounds.  
Pulmonary:  
Effort: Pulmonary effort is   
normal.  
Breath sounds: Normal breath   
sounds. No wheezing or rales.  
Abdominal:  
General: Bowel sounds are   
normal. There is no   
distension.  
Palpations: Abdomen is soft.   
There is no mass.  
Tenderness: There is no   
abdominal tenderness.  
Musculoskeletal:  
General: Normal range of   
motion.  
Cervical back: Normal range   
of motion and neck supple.  
Right lower leg: No edema.  
Left lower leg: No edema.  
Lymphadenopathy:  
Cervical: No cervical   
adenopathy.  
Skin:  
General: Skin is warm and   
dry.  
Capillary Refill: Capillary   
refill takes 2 to 3 seconds.  
Findings: No rash.  
Comments: Laceration: left   
lower leg: continued healing,   
no surrounding erythema or   
drainage to suggest infection  
Neurological:  
General: No focal deficit   
present.  
Mental Status: She is alert   
and oriented to person,   
place, and time.  
Psychiatric:  
Mood and Affect: Mood normal.  
Behavior: Behavior normal.  
Thought Content: Thought   
content normal.  
Judgment: Judgment normal.  
  
  
  
ASSESSMENT AND PLAN:  
  
No follow-ups on file.  
Problem List Items Addressed   
This Visit  
  
Ulcerative colitis,   
unspecified, without   
complications (CMS/HCC)  
Continue with current GI  
For scopes PRN, overall is   
doing pretty well at this   
time  
  
  
Open wound of lower leg -   
Primary  
Looking much better  
Recommend completing fu with   
wound care  
No follow up with me for this  
  
  
Acute non-recurrent maxillary   
sinusitis  
Sxs present for 5 days, and   
worsening, green nasal   
drainage and sinus pressure   
and HA  
Finish atb, fluids and rest,   
fu if not better  
  
  
Relevant Medications  
doxycycline (Vibra-Tabs) 100   
MG tablet  
  
  
Electronically signed by Cee Hand NP at 2025   
3:48 PM EST  
Associated Problem(s): Open   
wound of lower leg  
Formatting of this note might   
be different from the   
original.  
Looking much better  
Recommend completing fu with   
wound care  
No follow up with me for this  
Electronically signed by Cee Hand NP at 2025   
6:29 AM EST  
Electronically signed by Cee Hand NP at 2025   
3:47 PM EST  
Associated Problem(s):   
Ulcerative colitis,   
unspecified, without   
complications (CMS/HCC)  
Formatting of this note might   
be different from the   
original.  
Continue with current GI  
For scopes PRN, overall is   
doing pretty well at this   
time  
Electronically signed by Cee Hand NP at 2025   
6:27 AM EST  
documented in this encounter            [facility]  
   
                                                    2024 History of Presen  
t   
illness Narrative                       Associated Problem(s): Open   
wound of lower leg  
Formatting of this note might   
be different from the   
original.  
Sutures removed, dry clean   
non stick pad applied  
Will refer to wound care to   
continue to monitor and   
facilitate healing  
Recommend increasing protein   
consumption as well  
Elevated LLE to continue to   
help with swelling, which I   
feels is related to dependent   
status of LLE, and will   
continue to improve once   
continued healing  
Electronically signed by Cee Hand NP at 2024   
12:28 PM EST  
Formatting of this note is   
different from the original.  
Images from the original note   
were not included.  
  
Payton Bravo is a 74   
y.o. female presents with   
chief complaint of No chief   
complaint on file.  
  
HPI:  
  
Here for ER fu:  
See [facility] ER HPI for HPI  
11 sutures placed in left   
lower tib/fib region  
Finishing atb, no fever,   
chills, or purulent drainage  
+mild swelling about the   
bandage, as well as mod   
swelling below in the ankle   
region  
+itchiness to lower leg  
  
  
  
SUBJECTIVE:  
  
MEDICATIONS:  
Current Outpatient   
Medications  
Medication Instructions  
cephalexin (KEFLEX) 500 mg, 3   
times daily  
cetirizine-pseudoephedrine   
(ZyrTEC-D) 5-120 MG 12 hr   
tablet 1 tablet, Oral, 2   
times daily  
therapeutic   
multivitamin-minerals   
(Theragran-M) tablet 1   
tablet, Daily  
  
  
ALLERGIES:  
Allergies  
Allergen Reactions  
Latex Unknown  
Nitrofurantoin Monohyd Macro  
Other  
Penicillin G Benzathine &   
Proc Hives  
Wound Dressing Adhesive   
Unknown  
Penicillins Rash  
Other Reaction(s): Unknown  
  
  
REVIEW OF SYMPTOMS:  
  
Review of Systems  
Constitutional: Negative for   
appetite change, chills and   
fever.  
HENT: Negative for   
congestion, ear pain and sore   
throat.  
Eyes: Negative for pain,   
discharge, redness and visual   
disturbance.  
Respiratory: Negative for   
cough, shortness of breath   
and wheezing.  
Cardiovascular: Negative for   
chest pain, palpitations and   
leg swelling.  
Gastrointestinal: Negative   
for abdominal pain, blood in   
stool, constipation,   
diarrhea, nausea and   
vomiting.  
Genitourinary: Negative for   
difficulty urinating, dysuria   
and frequency.  
Musculoskeletal: Negative for   
arthralgias, back pain, joint   
swelling and myalgias.  
Skin: Positive for wound.   
Negative for rash.  
Neurological: Negative for   
dizziness, tremors, seizures,   
syncope and headaches.  
Psychiatric/Behavioral:   
Negative for behavioral   
problems, self-injury and   
suicidal ideas. The patient   
is not nervous/anxious.  
Hematological: Does not   
bruise/bleed easily.  
Endocrine: Negative for   
polydipsia, polyphagia and   
polyuria.  
Allergic/Immunologic:   
Negative for environmental   
allergies and food allergies.  
  
  
PAST MEDICAL HISTORY  
  
Past Medical History:  
Diagnosis Date  
Allergies  
Anemia  
Arthritis  
IBS (irritable bowel   
syndrome)  
Ulcerative colitis (CMS/HCC)  
Uterine cancer (CMS/HCC)  
  
  
Past Surgical History:  
Procedure Laterality Date  
APPENDECTOMY   
HYSTERECTOMY   
ORIF WRIST FRACTURE Left   
11/15/2019  
  
family history is not on   
file.  
  
OBJECTIVE:  
  
Visit Vitals  
/68 (BP Location: Left   
arm, Patient Position:   
Sitting, BP Cuff Size: Adult   
long)  
Pulse 84  
Temp 98.5 F (Temporal)  
Resp 19  
Ht 5' 3   
Wt 117 lb 12.8 oz  
SpO2 98%  
BMI 20.87 kg/m  
Smoking Status Never  
BSA 1.54 m  
  
  
Physical Exam  
Vitals and nursing note   
reviewed.  
Constitutional:  
General: She is not in acute   
distress.  
Appearance: Normal   
appearance.  
HENT:  
Head: Normocephalic and   
atraumatic.  
Right Ear: External ear   
normal.  
Left Ear: External ear   
normal.  
Nose: Nose normal.  
Eyes:  
Extraocular Movements:   
Extraocular movements intact.  
Conjunctiva/sclera:   
Conjunctivae normal.  
Cardiovascular:  
Rate and Rhythm: Normal rate   
and regular rhythm.  
Pulses: Normal pulses.  
Heart sounds: Normal heart   
sounds.  
Pulmonary:  
Effort: Pulmonary effort is   
normal.  
Breath sounds: Normal breath   
sounds.  
Abdominal:  
General: Bowel sounds are   
normal. There is no   
distension.  
Palpations: Abdomen is soft.   
There is no mass.  
Tenderness: There is no   
abdominal tenderness.  
Musculoskeletal:  
General: Normal range of   
motion.  
Cervical back: Normal range   
of motion and neck supple.  
Left lower leg: Edema   
present.  
Skin:  
General: Skin is warm and   
dry.  
Capillary Refill: Capillary   
refill takes 2 to 3 seconds.  
Findings: No rash (lower left   
leg : fleshed colored bumps   
felt, pt suspects this was an   
allergic reaction to wrap   
around leg).  
Comments: Laceration in a   
side ways V shape, w 11   
sutures intact, no   
surrounding s/s infection,   
sutures removed, edges not   
approximated, no drainage can   
be expressed  
No warmth to touch  
  
Neurological:  
General: No focal deficit   
present.  
Mental Status: She is alert   
and oriented to person,   
place, and time.  
Psychiatric:  
Mood and Affect: Mood normal.  
Behavior: Behavior normal.  
Thought Content: Thought   
content normal.  
Judgment: Judgment normal.  
  
  
  
ASSESSMENT AND PLAN:  
  
No follow-ups on file.  
Problem List Items Addressed   
This Visit  
  
Open wound of lower leg -   
Primary  
Sutures removed, dry clean   
non stick pad applied  
Will refer to wound care to   
continue to monitor and   
facilitate healing  
Recommend increasing protein   
consumption as well  
Elevated LLE to continue to   
help with swelling, which I   
feels is related to dependent   
status of LLE, and will   
continue to improve once   
continued healing  
  
  
Relevant Orders  
Ambulatory referral to Wound   
Clinic  
  
  
Electronically signed by Cee Hand NP at 2024   
12:30 PM EST  
documented in this encounter            [facility]  
   
                                        2024 Instructions   
  
  
Cee Hand NP -   
2024 11:00 AM   
ESTFormatting of this note   
might be different from the   
original.  
Keep wound clean and dry,   
elevate leg as much as   
possible  
Referral to [facility],   
they should be calling you  
Protein drink: premier   
Protein, several flavors,   
whit box/red on it  
Electronically signed by Cee Hand NP at 2024   
11:54 AM EST  
Electronically signed by Cee Hand NP at 2024   
11:54 AM EST  
  
documented in this encounter            [facility]  
   
                                                    2023 Hospital Discharg  
e   
instructions                              
  
  
Ana Oreilly MD - 2023   
8:21 AM ESTFormatting of this   
note might be different from   
the original.  
NPO 4 HOURS THEN CLEAR LIQUID   
TODAY  
Electronically signed by   
Ana Oreilly MD at   
2023 8:21 AM EST  
  
documented in this encounter            [facility]  
Work Phone: [phone]  
   
                                        2023 Miscellaneous Notes Formattin  
g of this note is   
different from the original.  
Patient: Payton Bravo  
MRN: [id number]  
  
Pre-sedation Evaluation:  
Sedation necessary for:   
Immobility and Analgesia  
Requesting service: GI   
service  
  
History of Present Illness:   
Screening colonoscopy  
  
History reviewed. No   
pertinent past medical   
history.  
  
Principle problems:  
There are no problems to   
display for this patient.  
  
Allergies:  
Allergies  
Allergen Reactions  
Adhesive Rash  
Latex Rash  
Penicillin Rash  
  
PTA/Current Medications:  
(Not in a hospital admission)  
  
Current Outpatient   
Medications  
Medication Sig Dispense   
Refill  
loratadine 10 mg capsule Take   
by mouth.  
  
Current Facility-Administered   
Medications  
Medication Dose Route   
Frequency Provider Last Rate   
Last Admin  
lactated Ringer's infusion 20   
mL/hr intravenous Continuous   
Ana Oreilly MD  
  
Past Surgical History:  
has a past surgical history   
that includes Hip   
Arthroplasty and   
Hysterectomy.  
  
Recent sedation/surgery (24   
hours) No  
  
Review of Systems:  
Please check all that apply:   
No significant medical   
history  
  
Pregnancy test completed   
prior to procedure on any   
menstruating female: none  
  
NPO guidelines met: Yes  
  
Physical Exam  
  
Airway  
Mallampati: II  
  
Cardiovascular - normal exam  
Rhythm: regular  
Rate: normal  
  
Dental  
Pulmonary - normal exam  
Breath sounds clear to   
auscultation  
  
  
Plan  
  
ASA 3  
  
Moderate  
  
  
Electronically signed by   
Ana Oreilly MD at   
2023 7:18 AM EST  
documented in this encounter            [facility]  
Work Phone: [phone]  
   
                                        2023 Note     Formatting of this n  
ote is   
different from the original.  
Patient: Payton Bravo  
MRN: [id number]  
  
Pre-sedation Evaluation:  
Sedation necessary for:   
Immobility and Analgesia  
Requesting service: GI   
service  
  
History of Present Illness:   
Screening colonoscopy  
  
History reviewed. No   
pertinent past medical   
history.  
  
Principle problems:  
There are no problems to   
display for this patient.  
  
Allergies:  
Allergies  
Allergen Reactions  
Adhesive Rash  
Latex Rash  
Penicillin Rash  
  
PTA/Current Medications:  
(Not in a hospital admission)  
  
Current Outpatient   
Medications  
Medication Sig Dispense   
Refill  
loratadine 10 mg capsule Take   
by mouth.  
  
Current Facility-Administered   
Medications  
Medication Dose Route   
Frequency Provider Last Rate   
Last Admin  
lactated Ringer's infusion 20   
mL/hr intravenous Continuous   
Ana Oreilly MD  
  
Past Surgical History:  
has a past surgical history   
that includes Hip   
Arthroplasty and   
Hysterectomy.  
  
Recent sedation/surgery (24   
hours) No  
  
Review of Systems:  
Please check all that apply:   
No significant medical   
history  
  
Pregnancy test completed   
prior to procedure on any   
menstruating female: none  
  
NPO guidelines met: Yes  
  
Physical Exam  
  
Airway  
Mallampati: II  
  
Cardiovascular - normal exam  
Rhythm: regular  
Rate: normal  
  
Dental  
Pulmonary - normal exam  
Breath sounds clear to   
auscultation  
  
  
Plan  
  
ASA 3  
  
Moderate  
  
  
Electronically signed by   
Ana Oreilly MD at   
2023 7:18 AM EST  
                                        [facility]  
Work Phone: [phone]  
   
                                        2023 Note     Formatting of this n  
ote is   
different from the original.  
Patient: Payton Bravo  
MRN: [id number]  
  
Pre-sedation Evaluation:  
Sedation necessary for:   
Immobility and Analgesia  
Requesting service: GI   
service  
  
History of Present Illness:   
Screening colonoscopy  
  
History reviewed. No   
pertinent past medical   
history.  
  
Principle problems:  
There are no problems to   
display for this patient.  
  
Allergies:  
Allergies  
Allergen Reactions  
Adhesive Rash  
Latex Rash  
Penicillin Rash  
  
PTA/Current Medications:  
(Not in a hospital admission)  
  
Current Outpatient   
Medications  
Medication Sig Dispense   
Refill  
loratadine 10 mg capsule Take   
by mouth.  
  
Current Facility-Administered   
Medications  
Medication Dose Route   
Frequency Provider Last Rate   
Last Admin  
lactated Ringer's infusion 20   
mL/hr intravenous Continuous   
Ana Oreilly MD  
  
Past Surgical History:  
has a past surgical history   
that includes Hip   
Arthroplasty and   
Hysterectomy.  
  
Recent sedation/surgery (24   
hours) No  
  
Review of Systems:  
Please check all that apply:   
No significant medical   
history  
  
Pregnancy test completed   
prior to procedure on any   
menstruating female: none  
  
NPO guidelines met: Yes  
  
Physical Exam  
  
Airway  
Mallampati: II  
  
Cardiovascular - normal exam  
Rhythm: regular  
Rate: normal  
  
Dental  
Pulmonary - normal exam  
Breath sounds clear to   
auscultation  
  
  
Plan  
  
ASA 3  
  
Moderate  
  
  
Electronically signed by   
Ana Oreilly MD at   
2023 7:18 AM EST  
                                        [facility]  
Work Phone: [phone]  
   
                                                    2023 History of Presen  
t   
illness Narrative                       Formatting of this note might   
be different from the   
original.  
error  
Electronically signed by   
NORA Chino   
at 2023 11:41 AM EST  
documented in this encounter            [facility]  
Work Phone: [phone]  
  
  
  
                                                    2023 Evaluation note   
  
  
  
                                                    Encounter   
Date                      Diagnosis                 Assessment Notes  
   
                                                                                        Sacroiliitis   
(ICD-10 - M46.1)                                            73 year old   
female presents   
with complaints   
of low back and   
right hip pain   
with radiation   
into the buttocks   
and into the   
groin on the   
right side. She   
states pain has   
been present for   
approximately 1   
year. She notes   
she was seen by   
Leonardo Gay at   
Dr Early's   
office and was   
treated with a   
right GT bursa   
injection 1 month   
ago however she   
feels this did   
not provide   
significant   
relief. She tried   
physical therapy   
with no relief of   
her symptoms. She   
feels pain is   
negatively   
impacting her   
daily activities   
and sleeping   
pattern. Prior to   
examining the   
patient, I   
reviewed progress   
notes from her   
referring   
provider Leonardo Gay. I also   
independently   
reviewed previous   
imaging of the   
lumbar spine   
which shows   
degenerative   
changes as well   
asfacet   
arthropathy.   
Anatomy of spine   
discussed in   
detail with   
patient in   
regards to   
patients   
condition.   
Patient is a   
candidate for a   
bilateral   
sacroiliac joint   
injection under   
fluoroscopic   
guidance. Risks   
and benefits of   
procedure   
explained to   
patient; patient   
verbalizes   
understanding.  
  
   
                                                                                        Lumbosacral   
spondylosis   
(ICD-10 -   
M47.817)                                                    In the future if   
the pain   
persists, we can   
consider   
proceeding with a   
bilateral lumbar   
facet MBB   
followed by a RFA   
if applicable   
under   
fluoroscopic   
guidance.  
  
   
                                                                                        Osteoarthritis of   
right hip (ICD-10   
- M16.11)                                                   Recent imaging of   
the right hip   
shows moderate   
arthritic   
changes. If her   
pain persists, we   
can consider   
proceeding with a   
right hip & GT   
bursa injection   
under   
fluoroscopic   
guidance.  
  
   
                                                                                        Chronic pain   
(ICD-10 - G89.29)                                           Follow up after   
procedure  
  
   
                                                                Other                                           Medical decision  
   
making shows a   
new problem to me   
with further   
workup planned or   
suggested with   
the potential for   
extensive   
treatment options   
that were   
considered with   
the most   
applicable given   
this patient's   
situation as   
noted above.   
Treatment options   
considered   
include a   
combination of   
physical therapy   
approaches,   
pharmacologic   
management, and   
interventional   
procedures. Those   
most applicable   
to the patient   
were discussed at   
this time. Risk   
of complications   
and/or morbidity   
and mortality is   
high given that   
acute and chronic   
pain poses a   
threat to life   
and bodily   
function if   
undertreated,   
poorly treated or   
with failure to   
maintain adequate   
treatment and   
timely followup.   
Given the serious   
and fluctuating   
nature of pain   
with extensive   
consideration for   
whenever pain   
changes, there   
always remains   
the possibility   
of prolonged   
functional   
impairment   
requiring   
constant patient   
reassessment and   
high-level   
medical decision   
making. The   
amount and   
complexity of   
data reviewed is   
high given that   
patient labs,   
radiology   
reports, and   
other test were   
obtained,   
reviewed and   
summarized as   
applicable from   
the physician   
portal and/or   
outside medical   
records.   
Pertinent   
positive and   
negative findings   
were considered   
in medical   
decision-making.  
  
  
[facility]  
Other Phone: [phone]- NotePatient Name: Payton Bravo  
Procedure Date: 2023 10:33 AM  
MRN: [id number]  
Account Number: [id number]  
YOB: 1950  
Admit Type: Outpatient  
Site: [facility]  
Ethnicity: Not  or   
Race: White  
Attending MD: Ana Oreilly MD, [id number]  
Procedure: Colonoscopy  
Indications: Therapeutic procedure, This patient was referred for a  
therapeutic procedure, Therapeutic procedure for colon  
polyps, Therapeutic procedure for known colon adenoma,  
Therapeutic procedure for known colon polyp  
Providers: Ana Oreilly MD (Doctor), Papo Centeno RN  
(Nurse), Chandan Contreras, Technician  
Referring:  
Medicines: See the Anesthesia note for documentation of the  
administered medications  
Complications: No immediate complications. Estimated blood loss: None.  
Procedure:  Pre-Anesthesia Assessment:  
- Prior to the procedure, a History and Physical was  
performed, and patient medications and allergies were  
reviewed. The patient's tolerance of previous  
anesthesia was also reviewed. The risks and benefits  
of the procedure and the sedation options and risks  
were discussed with the patient. All questions were  
answered, and informed consent was obtained. Prior  
Anticoagulants: The patient has taken no anticoagulant  
or antiplatelet agents. ASA Grade Assessment: III - A  
patient with severe systemic disease. After reviewing  
the risks and benefits, the patient was deemed in  
satisfactory condition to undergo the procedure.  
After I obtained informed consent, the scope was  
passed under direct vision. Throughout the procedure,  
the patient's blood pressure, pulse, and oxygen  
saturations were monitored continuously. The  
Colonoscope was introduced through the anus and  
advanced to the cecum, identified by appendiceal  
orifice and ileocecal valve. The quality of the bowel  
preparation was fair.  
Findings:  
A 25 mm polyp was found in the transverse colon. The polyp was flat.  
Preparations were made for mucosal resection. Chromoscopy with methylene  
blue was done to kaveh the borders of the lesion. Demarcation of the  
lesion was performed to clearly identify boundaries of the lesion. A 0.1  
mg/mL solution of epinephrine with methylene blue was injected to raise  
the lesion. Piecemeal mucosal resection using a snare was performed.  
Resection and retrieval were complete. Resected tissue margins were  
examined and clear of polyp tissue. For hemostasis, four hemostatic  
clips were successfully placed. There was no bleeding at the end of the  
procedure.  
The exam was otherwise without abnormality on direct and retroflexion  
views.  
Moderate Sedation:  
MAC  
Estimated Blood Loss:  
Estimated blood loss: none.  
Impression: - Preparation of the colon was fair.  
- One 25 mm polyp in the transverse colon, removed  
with mucosal resection. Resected and retrieved. Clips  
were placed.  
- The examination was otherwise normal on direct and  
retroflexion views.  
- Mucosal resection was performed. Resection and  
retrieval were complete.  
Recommendation: - Repeat colonoscopy in 6 months for surveillance  
after piecemeal polypectomy.  
- NPO for 6 hours, then advacne to full liquid diet 2  
days as per instruction given to the patient  
- Await pathology results.  
Procedure Code(s): --- Professional ---  
17178, Colonoscopy, flexible; with endoscopic mucosal  
resection  
Diagnosis Code(s): --- Professional ---  
D12.6, Benign neoplasm of colon, unspecified  
K63.5, Polyp of colon  
D12.3, Benign neoplasm of transverse colon (hepatic  
flexure or splenic flexure)  
CPT copyright  American Medical Association. All rights reserved.  
The codes documented in this report are preliminary and upon  review may  
be revised to meet current compliance requirements.  
Attending Participation:  
I personally performed the entire procedure.  
MD Ana Barrett M (more content not included)...PROVATION - [id number]- Note
PROCEDURE: XR FOOT LT MIN 3 VIEWS  
HISTORY: Pain in left foot ; follow-up fifth metatarsal fracture  
COMPARISON: XR foot left 3/16/2023  
FINDINGS:  
BONES:Stable normal alignment and increasing density of the fracture line of the  
distal fifth metatarsal diaphysis.  
SOFT TISSUES:No visible soft tissue swelling.  
EFFUSION:None visible.  
OTHER: Negative.  
IMPRESSION:  
1. Stable alignment and ongoing bone healing of the fifth metatarsal fracture.  
Electronically authenticated by: BRODERICK MARQUES Date: 2023 14:43[facility]03- NotePROCEDURE: XR FOOT LT MIN 3 VIEWS  
HISTORY: Pain in left foot ; follow-up fifth metatarsal fracture  
COMPARISON: XR foot left 2023  
FINDINGS:  
BONES:Stable, normal alignment of the fifth metatarsal with increasing density  
of previously seen oblique fracture line.  
SOFT TISSUES:No visible soft tissue swelling.  
EFFUSION:None visible.  
OTHER: Negative.  
IMPRESSION:  
1. Stable alignment and ongoing bone healing of the distal fifth metatarsal  
diaphyseal fracture.  
Electronically authenticated by: BRODERICK MARQUES Date: 2023 15:19[facility]02- NotePROCEDURE: XR FOOT LT MIN 3 VIEWS  
HISTORY: Pain in left foot ; follow-up left fifth metatarsal fracture  
COMPARISON: None.  
FINDINGS:  
BONES:Slight increased density at fracture line and mild callus formation along  
margins of the oblique fracture of the fifth metatarsal distal diaphysis.  
SOFT TISSUES:No visible soft tissue swelling.  
EFFUSION:None visible.  
OTHER: Negative.  
IMPRESSION:  
1. Stable alignment and early bone healing changes involving the fifth  
metatarsal fracture.  
Electronically authenticated by: BRODERICK MARQUES Date: 2023 13:08[facility]12- Hospital Discharge instructions* Pharmacy*   
  
Cee Arthur RN - 2021 1:17 PM EST  
  
  
  
Formatting of this note might be different from the original.  
  
  
tamsulosin  
Pronunciation: espinal st[address] LANE sin  
Brand: Flomax  
What is the most important information I should know about tamsulosin?  
Follow all directions on your medicine label and package. Tell each of your 
healthcare providers about all your medical conditions, allergies, and all 
medicines you use.  
What is tamsulosin?  
Tamsulosin is an alpha-blocker that is used to improve urination in men with 
benign prostatic hyperplasia (enlarged prostate).  
Tamsulosin is not approved for use in women or children.  
Tamsulosin may also be used for purposes not listed in this medication guide.  
What should I discuss with my healthcare provider before taking tamsulosin?  
You should not use tamsulosin if you are allergic to it.  
Tell your doctor if you have ever had:  
liver or kidney disease;  
prostate cancer;  
low blood pressure; or  
an allergy to sulfa drugs.  
Tamsulosin can affect your pupils. If you have cataract surgery, tell your 
surgeon ahead of time that you use this medicine.  
Tamsulosin is not for use in women, and the effects of this medicine during 
pregnancy or in breastfeeding women are unknown.  
How should I take tamsulosin?  
Your doctor may test your prostate specific antigen (PSA) to check for prostate 
cancer before you take tamsulosin.  
Follow all directions on your prescription label and read all medication guides 
or instruction sheets. Your doctor may occasionally change your dose. Use the 
medicine exactly as directed.  
Tamsulosin is usually taken once a day, approximately 30 minutes after the same 
meal each day.  
Swallow the capsule whole and do not crush, chew, break, or open it.  
Your blood pressure will need to be checked often.  
Some things can cause your blood pressure to get too low. This includes 
vomiting, diarrhea, or heavy sweating. Call your doctor if you are sick with 
vomiting or diarrhea.  
Store at room temperature away from moisture and heat.  
If you stop taking tamsulosin for any reason, call your doctor before you start 
taking it again. You may need a dose adjustment.  
What happens if I miss a dose?  
Take the medicine as soon as you can, but skip the missed dose if it is almost 
time for your next dose. Do not take two doses at one time.  
If you miss your doses for several days in a row, talk with your doctor before 
restarting the medication.  
What happens if I overdose?  
Seek emergency medical attention or call the Poison Help line at 1-465.625.6951.  
What should I avoid while taking tamsulosin?  
Avoid driving or hazardous activity until you know how this medicine will affect
 you. Your reactions could be impaired. Avoid getting up too fast from a sitting
 or lying position, or you may feel dizzy.  
What are the possible side effects of tamsulosin?  
Get emergency medical help if you have signs of an allergic reaction (hives, 
difficult breathing, swelling in your face or throat) or a severe skin reaction 
(fever, sore throat, burning eyes, skin pain, red or purple skin rash with 
blistering and peeling).  
Stop using tamsulosin and call your doctor at once if you have:  
a light-headed feeling, like you might pass out; or  
penis erection that is painful or lasts 4 hours or longer.  
Tamsulosin lowers blood pressure and may cause dizziness or fainting, especially
 when you first start taking it. You may feel very dizzy when you first wake up.
 Avoid getting up too fast from a sitting or lying position, or you may feel 
dizzy.  
Common side effects may include:  
abnormal ejaculation, decreased amount of semen;  
dizziness, drowsiness, weakness;  
runny nose, cough;  
back pain, chest pain;  
nausea, diarrhea;  
tooth problems;  
blurred vision;  
sleep problems (insomnia); or  
decreased interest in sex.  
This is not a complete list of side effects and others may occur. Call your 
doctor for medical advice about side effects. You may report side effects to FDA
 at 7-459-YFH-3504.  
What other drugs will affect tamsulosin?  
Tell your doctor about all your current medicines. Many drugs can increase your 
risk of very low blood pressure while taking tamsulosin, especially:  
medicines similar to tamsulosin (alfuzosin, doxazosin, prazosin, silodosin, or 
terazosin);  
heart or blood pressure medication; or  
sildenafil (Viagra) and other erectile dysfunction medicines.  
This list is not complete and many other drugs may affect tamsulosin. This 
includes prescription and over-the-counter medicines, vitamins, and herbal 
products. Not all possible drug interactions are listed here.  
Where can I get more information?  
Your pharmacist can provide more information about tamsulosin.  
Remember, keep this and all other medicines out of the reach of children, never 
share your medicines with others, and use this medication only for the 
indication prescribed.  
Every effort has been made to ensure that the information provided by [employer]. ('[employer]') is accurate, up-to-date, and complete, but no guarantee 
is made to that effect. Drug information contained herein may be time sensitive.
 [employer] information has been compiled for use by healthcare practitioners and 
consumers in the [place] and therefore [employer] does not warrant that uses 
outside of the [place] are appropriate, unless specifically indicated 
otherwise. [employer] drug information does not endorse drugs, diagnose patients 
or recommend therapy. [employer] drug information isan informational resource 
designed to assist licensed healthcare practitioners in caring for their p
atients and/or to serve consumers viewing this service as a supplement to, and 
not a substitute for, the expertise, skill, knowledge and judgment of healthcare
 practitioners. The absence of a warningfor a given drug or drug combination in 
no way should be construed to indicate that the drug or drug combination is 
safe, effective or appropriate for any given patient. [employer] does not assume any
 responsibility for any aspect of healthcare administered with the aid of 
information [employer] provides. The information contained herein is not intended to
 cover all possible uses, directions, precautions, warnings, drug interactions, 
allergic reactions, or adverse effects. If you have questions about the drugs 
you are taking, check with your doctor, nurse or pharmacist.  
Copyright 1132-4731 [employer] Version: 9.02. Revision date: 2019.  
Care instructions adapted under license by [employer]. If you have questions 
about a medical condition or this instruction, always ask your healthcare 
professional. [employer] disclaims any warranty or liability for 
your use of this information.  
  
  
ciprofloxacin (oral)  
Pronunciation: SIP jason FLOX a sin  
Brand: Cipro, Proquin XR  
What is the most important information I should know about ciprofloxacin?  
Ciprofloxacin can cause serious side effects, including tendon problems, nerve 
damage, serious moodor behavior changes, or low blood sugar.  
Stop using this medicine and call your doctor at once if you have: headache, 
hunger, irritability, numbness, tingling, burning pain, confusion, agitation, 
paranoia, problems with memory or concentration, thoughts of suicide, or sudden 
pain or movement problems in any of your joints.  
In rare cases, ciprofloxacin may cause damage to your aorta, which could lead to
 dangerous bleedingor death. Get emergency medical help if you have severe and 
constant pain in your chest, stomach, or back.  
What is ciprofloxacin?  
Ciprofloxacin is a fluoroquinolone (ngya-z-EQLQ-o-lone) antibiotic, it is used 
to treat different types of bacterial infections. It is also used to treat 
people who have been exposed to anthrax or certain types of plague. 
Ciprofloxacin extended-release is only approved for use in adults.  
Fluoroquinolone antibiotics can cause serious or disabling side effects that may
 not be reversible.Ciprofloxacin should be used only for infections that cannot 
be treated with a safer antibiotic.  
Ciprofloxacin may also be used for purposes not listed in this medication guide.  
What should I discuss with my healthcare provider before taking ciprofloxacin?  
You should not use ciprofloxacin if you are allergic to it, or if:  
you also take tizanidine; or  
you are allergic to other fluoroquinolones (levofloxacin, moxifloxacin, 
norfloxacin, ofloxacin).  
Ciprofloxacin may cause swelling or tearing of a tendon (the fiber that connects
 bones to muscles in the body), especially in the Achilles' tendon of the heel. 
This can happen during treatment or several months after you stop taking 
ciprofloxacin. Tendon problems may be more likely in children and older adults, 
or people who use steroid medicine or have had an organ transplant.  
Tell your doctor if you have ever had:  
arthritis or problems with your tendons, bones or joints (especially in 
children);  
diabetes, low blood sugar;  
nerve problems;  
an aneurysm or blood circulation problems;  
heart problems, or a heart attack;  
muscle weakness, myasthenia gravis;  
liver or kidney disease;  
a seizure, head injury, or brain tumor;  
trouble swallowing pills;  
long QT syndrome (in you or a family member); or  
low levels of potassium in your blood (hypokalemia).  
Do not give this medicine to a child without medical advice.  
It is not known whether this medicine will harm an unborn baby. Tell your doctor
 if you are pregnant.  
You should not breastfeed while taking ciprofloxacin and for 2 days after your 
last dose. Ask your doctor about breastfeeding if you take ciprofloxacin for 
anthrax exposure.  
How should I take ciprofloxacin?  
Follow all directions on your prescription label and read all medication guides 
or instruction sheets. Use the medicine exactly as directed.  
Take ciprofloxacin at the same time each day, with or without food.  
Shake the oral suspension (liquid) for 15 seconds before you measure a dose. Use
 the dosing syringeprovided, or use a medicine dose-measuring device (not a 
kitchen spoon). Do not give ciprofloxacin oral suspension through a feeding 
tube.  
Swallow the extended-release tablet whole and do not crush, chew, or break it.  
Drink plenty of liquids while you are taking ciprofloxacin.  
Use this medicine for the full prescribed length of time, even if your symptoms 
quickly improve. Skipping doses can increase your risk of infection that is 
resistant to medication. Ciprofloxacin willnot treat a viral infection such as 
the flu or a common cold.  
Do not share ciprofloxacin with another person.  
Store at room temperature away from moisture and heat. Do not allow the liquid 
medicine to freeze. Throw away any unused liquid after 14 days.  
What happens if I miss a dose?  
If you take regular tablets or oral suspension: Take the medicine as soon as you
 can, but skip the missed dose if your next dose is due in less than 6 hours.  
If you take extended-release tablets: Take the medicine as soon as you can, but 
skip the missed dose if your next dose is due in less than 8 hours.  
Do not take two doses at one time.  
What happens if I overdose?  
Seek emergency medical attention or call the Poison Help line at 1-276.741.5974.  
What should I avoid while taking ciprofloxacin?  
Do not take ciprofloxacin with dairy products such as milk or yogurt, or with 
calcium-fortified juice. You may eat or drink these products with your meals, 
but do not use them alone when taking ciprofloxacin.  
Antibiotic medicines can cause diarrhea, which may be a sign of a new infection.
 If you have diarrhea that is watery or bloody, call your doctor before using 
anti-diarrhea medicine.  
Ciprofloxacin could make you sunburn more easily. Avoid sunlight or tanning 
beds. Wear protective clothing and use sunscreen (SPF 30 or higher) when you are
 outdoors. Tell your doctor if you have severe burning, redness, itching, rash, 
or swelling after being in the sun.  
Avoid driving or hazardous activity until you know how this medicine will affect
 you. Your reactions could be impaired.  
What are the possible side effects of ciprofloxacin?  
Get emergency medical help if you have signs of an allergic reaction (hives, 
difficult breathing, swelling in your face or throat) or a severe skin reaction 
(fever, sore throat, burning in your eyes,skin pain, red or purple skin rash 
that spreads and causes blistering and peeling).  
Ciprofloxacin can cause serious side effects, including tendon problems, damage 
to your nerves (which may be permanent), serious mood or behavior changes (after
 just one dose), or low blood sugar (which can lead to coma).  
Stop taking this medicine and call your doctor at once if you have:  
low blood sugar --headache, hunger, irritability, dizziness, nausea, fast heart 
rate, or feeling shaky;  
nerve damage symptoms --numbness, tingling, burning pain in your hands, arms, 
legs, or feet:  
serious mood or behavior changes --nervousness, confusion, agitation, paranoia, 
hallucinations, memory problems, trouble concentrating, thoughts of suicide; or  
signs of tendon rupture --sudden pain, swelling, bruising, tenderness, 
stiffness, movement problems, or a snapping or popping sound in any of your 
joints (rest the joint until you receive medical care or instructions).  
In rare cases, ciprofloxacin may cause damage to your aorta, the main blood 
artery of the body. This could lead to dangerous bleeding or death. Get 
emergency medical help if you have severe and constant pain in your chest, 
stomach, or back.  
Also, stop using ciprofloxacin and call your doctor at once if you have:  
severe stomach pain, diarrhea that is watery or bloody;  
fast or pounding heartbeats, fluttering in your chest, shortness of breath, and 
sudden dizziness (like you might pass out);  
any skin rash, no matter how mild;  
muscle weakness, breathing problems;  
little or no urination;  
jaundice (yellowing of the skin or eyes); or  
increased pressure inside the skull --severe headaches, ringing in your ears, 
dizziness, nausea, vision problems, pain behind your eyes.  
Common side effects may include:  
nausea, vomiting, diarrhea, stomach pain;  
headache; or  
abnormal liver function tests.  
This is not a complete list of side effects and others may occur. Call your 
doctor for medical advice about side effects. You may report side effects to FDA
 at 1-523-FDA-7544.  
What other drugs will affect ciprofloxacin?  
Some medicines can make ciprofloxacin much less effective when taken at the same
 time. If you take any of the following medicines, take your ciprofloxacin dose 
2 hours before or 6 hours after you take the other medicine.  
the ulcer medicine sucralfate, or antacids that contain calcium, magnesium, or 
aluminum (such as Maalox, Milk of Magnesia, Mylanta, Pepcid Complete, Rolaids, 
Tums, and others);  
didanosine (Videx) powder or chewable tablets;  
vitamin or mineral supplements that contain calcium, iron, magnesium, or zinc.  
Tell your doctor about all your other medicines, especially:  
clozapine, cyclosporine, methotrexate, phenytoin, probenecid, ropinirole, 
sildenafil, or theophylline;  
a blood thinner (warfarin, Coumadin, Jantoven);  
heart medication or a diuretic or  water pill ;  
oral diabetes medicine;  
products that contain caffeine;  
medicine to treat depression or mental illness;  
steroid medicine (such as prednisone); o  
NSAIDs (nonsteroidal anti-inflammatory drugs) --aspirin, ibuprofen (Advil, 
Motrin), naproxen (Aleve), celecoxib, diclofenac, indomethacin, meloxicam, and 
others;  
This list is not complete. Other drugs may affect ciprofloxacin, including 
prescription and over-the-counter medicines, vitamins, and herbal products. Not 
all possible drug interactions are listed here.  
Where can I get more information?  
Your pharmacist can provide more information about ciprofloxacin.  
Remember, keep this and all other medicines out of the reach of children, never 
share your medicines with others, and use this medication only for the 
indication prescribed.  
Every effort has been made to ensure that the information provided by [employer]. ('[employer]') is accurate, up-to-date, and complete, but no guarantee 
is made to that effect. Drug information contained herein may be time sensitive.
 [employer] information has been compiled for use by healthcare practitioners and 
consumers in the [place] and therefore [employer] does not warrant that uses 
outside of the [place] are appropriate, unless specifically indicated 
otherwise. [employer] drug information does not endorse drugs, diagnose patients 
or recommend therapy. [employer] drug information isan informational resource 
designed to assist licensed healthcare practitioners in caring for their p
atients and/or to serve consumers viewing this service as a supplement to, and 
not a substitute for, the expertise, skill, knowledge and judgment of healthcare
 practitioners. The absence of a warningfor a given drug or drug combination in 
no way should be construed to indicate that the drug or drug combination is 
safe, effective or appropriate for any given patient. [facility] does not assume any
 responsibility for any aspect of healthcare administered with the aid of 
information [facility] provides. The information contained herein is not intended to
 cover all possible uses, directions, precautions, warnings, drug interactions, 
allergic reactions, or adverse effects. If you have questions about the drugs 
you are taking, check with your doctor, nurse or pharmacist.  
Copyright 6945-8168 [facility]. Version: 23.01. Revision date: 
7/15/2020.  
Care instructions adapted under license by [employer]. If you have questions 
about a medical condition or this instruction, always ask your healthcare 
professional. [employer] disclaims any warranty or liability for 
your use of this information.  
  
  
  
  
Electronically signed by Cee Arthur RN at 2021 1:17 PM EST  
  
  
  
  
* Discharge Instr - Activity*   
  
Cee Arthur RN - 2021 1:18 PM EST  
  
  
  
Formatting of this note might be different from the original.  
No driving or heavy lifting until follow-up appointment with Dr. Tirado.  
  
  
  
Electronically signed by Cee Arthur RN at 2021 1:18 PM EST  
  
  
  
  
* Discharge Instr - Diet*   
  
Cee Arthur RN - 2021 1:18 PM EST  
  
  
  
Formatting of this note might be different from the original.  
Good nutrition is important when healing from an illness, injury, or surgery. 
Follow any nutrition recommendations given to you during your hospital stay.  
If you were given an oral nutrition supplement while in the hospital, continue 
to take this supplement at home. You can take it with meals, in-between meals, 
and/or before bedtime. These supplements can be purchased at most local grocery 
stores, pharmacies, and chain [employer]-stores.  
If you have any questions about your diet or nutrition, call the hospital and 
ask for the dietitian.  
  
  
  
  
Electronically signed by Cee Arthur RN at 2021 1:18 PM EST  
  
  
  
  
* Discharge Instr - DAVID*   
  
Cee Arthur RN - 2021 1:18 PM EST  
  
  
  
Formatting of this note is different from the original.  
Continuity of Care Form  
  
Patient Name: Payton Bravo  
: 1950 MRN: [id number]  
  
Admit date: 2021 Discharge date: ***  
  
Code Status Order: Full Code  
Advance Directives:  
Advance Care Flowsheet Documentation  
  
Date/Time Healthcare Directive Type of Healthcare Directive Copy in Chart 
Healthcare Agent Appointed Healthcare Agent's Name Healthcare Agent's Phone 
Number  
[phone] No, patient does not have an advance directive for healthcare 
treatment  
  
  
  
Admitting Physician: Marquise Tiardo DO  
PCP: CEE HAND  
  
Discharging Nurse: ***  
Discharging Hospital Unit/Room#: 331/331-01  
Discharging Unit Phone Number: ***  
  
Emergency Contact:  
Extended Emergency Contact Information  
Primary Emergency Contact: Toya Carson  
Home Phone: [phone]  
Mobile Phone: [phone]  
Relation: Child  
Secondary Emergency Contact: Cee Parrish  
Home Phone: [phone]  
Mobile Phone: [phone]  
Relation: Child  
  
Past Surgical History:  
Past Surgical History:  
Procedure Laterality Date  
COLONOSCOPY  
COLPOPEXY 2021  
SACRAL COLPOPEXY ROBOTIC WITH RESTORELLE MESH  
COLPOPEXY N/A 2021  
SACRAL COLPOPEXY ROBOTIC WITH RESTORELLE MESH performed by Marquise Tirado DO at
 [facility] OR  
CYSTOSCOPY 2021  
CYSTOSCOPY N/A 2021  
CYSTOSCOPY performed by Marquise Tirado DO at [facility] OR  
FEMUR SURGERY Right 1972  
matthew inserted  
FEMUR SURGERY Right 1974  
matthew removed  
HYSTERECTOMY 1979  
NOSE SURGERY 1965  
SKIN BIOPSY Left  
nostril  
VAGINA SURGERY N/A 2021  
VAGINAL REPAIR POSTERIOR performed by Marquise Tirado DO at [facility] OR  
WRIST SURGERY Left 2019  
  
Immunization History:  
  
There is no immunization history on file for this patient.  
  
Active Problems:  
Patient Active Problem List  
Diagnosis Code  
S/P sacrocolpopexy Z98.890  
  
Isolation/Infection:  
Isolation  
  
  
No Isolation  
  
  
  
Patient Infection Status  
  
None to display  
  
  
  
Nurse Assessment:  
Last Vital Signs: BP (!) 98/56   Pulse 61   Temp 97.3 F (36.3 C) (Oral)   Resp 
16   Ht 5' 4  (1.626m)   Wt 114 lb 13.8 oz (52.1 kg)   SpO2 95%   BMI 19.72 kg/m  
Last documented pain score (0-10 scale): Pain Level: 7  
Last Weight:  
Wt Readings from Last 1 Encounters:  
21 114 lb 13.8 oz (52.1 kg)  
  
Mental Status: {IP PT MENTAL STATUS:}  
  
IV Access:  
{ DAVID IV ACCESS:181164822}  
  
Nursing Mobility/ADLs:  
Walking {CHP DME ADLs:202319481}  
Transfer {CHP DME ADLs:291810033}  
Bathing {CHP DME ADLs:412477397}  
Dressing {CHP DME ADLs:113855789}  
Toileting {CHP DME ADLs:250376513}  
Feeding {CHP DME ADLs:865219767}  
Med Admin {CHP DME ADLs:034683550}  
Med Delivery { DAVID MED Delivery:014402526}  
  
Wound Care Documentation and Therapy:  
  
  
Elimination:  
Continence:  
Bowel: {YES / NO:}  
Bladder: {YES / NO:}  
Urinary Catheter: {Urinary Catheter:775356312}  
Colostomy/Ileostomy/Ileal Conduit: {YES / NO:}  
  
  
Date of Last BM: ***  
  
Intake/Output Summary (Last 24 hours) at 2021 1318  
Last data filed at 2021 0446  
Gross per 24 hour  
Intake 2540 ml  
Output 1925 ml  
Net 615 ml  
  
I/O last 3 completed shifts:  
In: 2540 [P.O.:240; I.V.:2300]  
Out: 1925 [Urine:1825; Blood:100]  
  
Safety Concerns:  
{ DAVID Safety Concerns:901153289}  
  
Impairments/Disabilities:  
{ DAVID Impairments/Disabilities:198410294}  
  
Nutrition Therapy:  
Current Nutrition Therapy:  
{ DAVID Diet List:254557816}  
  
Routes of Feeding: {CHP DME Other Feedings:992189642}  
Liquids: {Slp liquid thickness:94364}  
Daily Fluid Restriction: {CHP DME Yes amt example:810102808}  
Last Modified Barium Swallow with Video (Video Swallowing Test): {Done Not Done 
Date:}  
  
Treatments at the Time of Hospital Discharge:  
Respiratory Treatments: ***  
Oxygen Therapy: {Therapy; copd oxygen:68101}  
Ventilator:  
{ CC Vent List:089984832}  
  
Rehab Therapies: {THERAPEUTIC INTERVENTION:5134427354}  
Weight Bearing Status/Restrictions: { CC Weight Bearin}  
Other Medical Equipment (for information only, NOT a DME order): 
{EQUIPMENT:386099054}  
Other Treatments: ***  
  
Patient's personal belongings (please select all that are sent with patient):  
{CHP DME Belongings:904090948}  
  
RN SIGNATURE: {Esignature:261495729}  
  
CASE MANAGEMENT/SOCIAL WORK SECTION  
  
Inpatient Status Date: ***  
  
Readmission Risk Assessment Score:  
Readmission Risk  
  
Risk of Unplanned Readmission: 0  
  
  
  
Discharging to Facility/ Agency  
Name:  
Address:  
Phone:  
Fax:  
  
Dialysis Facility (if applicable)  
Name:  
Address:  
Dialysis Schedule:  
Phone:  
Fax:  
  
/ signature: {Esignature:737890250}  
  
PHYSICIAN SECTION  
  
Prognosis: {Prognosis:5667462554}  
  
Condition at Discharge: { Patient Condition:896057896}  
  
Rehab Potential (if transferring to Rehab): {Prognosis:2230735425}  
  
Recommended Labs or Other Treatments After Discharge: ***  
  
Physician Certification: I certify the above information and transfer of Payton Bravo is necessary for the continuing treatment of the diagnosis listed and
that she requires {Admit to Appropriate Level of Care:53270} for 
{GREATER/LESS:049280817} 30 days.  
  
Update Admission H&P: {CHP DME Changes in HandP:752936922}  
  
PHYSICIAN SIGNATURE: {Esignature:771571978}  
  
  
  
Electronically signed by Cee Arthur RN at 2021 1:18 PM EST  
  
  
  
  
* Additional Instructions*   
  
Melissa Shelton, DO - 2021  
  
  
  
Formatting of this note is different from the original.  
Images from the original note were not included.  
  
  
POSTOPERATIVE PATIENT INSTRUCTIONS  
MAJOR & MINOR SURGICAL PROCEDURES  
  
Congratulations! You have taken the brave step of undergoing a surgery in order 
to try to improve your health. Now it is time to focus on healing outside of the
hospital / surgery center setting. To make your dismissal as successful as 
possible, it is recommended that you thoroughly review these postoperative 
instructions. These instructions pertain to, but are not limited to the 
following procedures:  
  
MAJOR  
; Hysterectomy Vaginal / Laparoscopic / Robotic  
; With or Without tube-ovarian Removal (Salpingo-oophorectomy)  
; Sacrocolpopexy / Sacroperineopexy / Sacrocervicopexy (Lifting the vagina / 
cervix to the sacrum)  
; Major Vaginal Prolapse repairs  
; Cystocele repair (Anterior Colporrhaphy)  
; Rectocele repair (Posterior Colporrhaphy)  
; Enterocele repair  
; Vaginal vault repair  
; Closing or removal of the vagina (Colpocleisis / Colpectomy)  
; Major urinary incontinence surgery (Cruz Urethropexy, MMK)  
; Major fibroid removal (Myomectomy)  
; Major tubal surgery (re-anastomosis, ectopic pregnancy, pelvic inflammatory 
disease)  
; Major surgery for adhesions or endometriosis involving bowel  
; Major vaginal mesh removal  
; Fistula repair of the bladder or colorectum to the vagina  
; Creation of a new vagina (Neovaginoplasty) or repair of a Mullerian Anomaly  
; Other  
  
MINOR  
; Hysteroscopy with Dilatation / Curettage, Endometrial Ablation  
; Laparoscopy With or Without minor tubal or ovarian surgery  
; Minor Vaginal Prolapse repairs  
; Cystocele repair (Anterior Colporrhaphy)  
; Rectocele repair (Posterior Colporrhaphy)  
; Urethrocele repair  
; Minor urinary incontinence surgery (Slings, Transurethral Bulking)  
; Minor vaginal surgery (Mesh removal, Laser ablation, Biopsies, Labial 
revisions, Injections)  
; Minor surgery of the bladder (DMSO, Hydrodistention, Botox, etc.)  
; Other  
  
1  
  
POST OPERATIVE BASIC INSTRUCTIONS  
The office will call on the Friday, or within 1 week, after your surgery to make
sure you are doingwell and to answer questions.  
1. Your 1st post-op visit will occur within 1-2 weeks after your surgery.  
2. Your final post-op visit will occur @ 4-6 weeks depending on the surgery & 
your desire to return to work.  
3. Your surgery and recovery may require more visits in between the 1st and 
final visits.  
  
DO S, DONT S, & WHEN TO CALL  
As reviewed with you on the morning of your discharge, for the 1st week out of 
surgery below is a QUICK list of DO s, DONT s, and WHEN TO CALL:  
  
5 THINGS YOU MAY DO 5 THINGS YOU MAY NOT DO FOR 4-6 WEEKS  
-Shower with Ivory Soap and water -Tub bathe, hot tub, or swim  
-Gradually increase walking -Heavy lifting > 15lbs (2 gallons worth)  
-Go up and down stairs slowly -Insert anything into the vagina (sex, douching, 
tampons) -Drive a car / motorcycle (*See  
-Light housecleaning (dusting, dishwashing) Special Considerations)  
-Short local travel (restaurant, [employer]) -Long distance travel > 1.5 hours (*See
Special Considerations)  
  
5 SYMPTOMS TO CALL FOR:  
-Sustained fever >100.4 despite Tylenol or a cold shower, especially associated 
with redness of incision/s  
-Pain out of the ordinary (>7) despite pain meds / anti-inflammatories  
-Heavy vaginal bleeding > 1 pad / hour with large red clots  
-Inability to eliminate urine (especially if catheterized) or flatus / stool 
(with sustained distention & nausea)  
-Calf pain or extreme shortness of breath  
  
QUESTIONS, CONCERNS, EMERGENCIES  
* Please call the office with any questions or concerns at [phone].  
  
* If during office hours, your issue may require an appointment. If after hours,
the answering service will connect you with the physician On Call.  
  
* If you are concerned that your issue may be emergent, PLEASE CALL FIRST.  
; Many issues may be resolved over the phone and avoid an unnecessary and 
expensive ER visit.  
; If you truly have an emergency related to the surgery and call first:  
*You will be directed to the hospital ER in which you had your surgery.  
[facility] primarily or [facility] rarely.  
[facility] patients may be asked to report to [facility] if 
Dr. Tirado s on-call partner is assuming responsibility.  
; Calling first helps to expedite your care and avoids an unnecessary and 
expensive ambulance transfer to [facility]. Dial 911 or go to your 
closest ER if your emergency is related to a potential heart attack or stroke.  
2  
  
DISCHARGE MEDICINES  
  
* Norco 325/5mg tablets or alternative narcotic. Take 1-2 tablets by mouth every
4-6 hours as needed for pain.  
- Per [place] [employer] laws, only 1 week of narcotics may be 
prescribed at a time.  
- Do not take extra Tylenol (Acetaminophen) orally as Norco already contains the
medicine.  
- May take 500mg orally every 4-6 hours if off of Norco.  
* Ibuprofen 400-800mg is safe to take. Take 1 tablet by mouth every 8 hours for 
inflammation.  
  
* Senokot S. Take two tablets by mouth every night to prevent constipation. Stop
if loose stools occur.  
* If an antibiotic is required: Keflex 250-500mg take 1 tablet by mouth 3x / day
as prescribed  
OR Cipro 250-500mg take 1 tablet by mouth 2x / day as prescribed  
  
* Other medicines or antibiotics may be prescribed based on your postoperative 
course or situation.  
*You may resume taking any medications you were taking before your surgery, 
unless told otherwise.  
  
**DUE TO BLEEDING RISK, DO NOT RESUME HOME ANTICOAGULANTS UNLESS DISCUSSED**  
  
SPECIAL CONSIDERATIONS  
After surgery, give yourself a chance to adjust and recover. Some patients feel 
fine within a month. Many need a little extra time. Do not be concerned if you 
feel fatigued for the first month, your body is recovering. It may take several 
weeks for you to get your energy back. Even if energy has returned, please do 
not be tempted to re-engage in strenuous activity. Although mild weight gain is 
not uncommon, most of it is IV fluid weight that your body will remove with 
time. You have the rest ofyour life to exercise, so some patience and rest is 
wise in the short term in order to heal successfully long term. Once you have 
fully recovered, you may focus on enjoying your life. Keep in mind, you will 
continue to heal for 6-12 months after surgery, so use common sense to protect 
your surgery (avoid repetitive heavy lifting, constipation, chronic pelvic 
strain.)  
  
In particular, if you had a hysterectomy, you may have both physical and 
emotional effects that maybe brief or long term. After hysterectomy, periods 
will stop and a woman can no longer achieve pregnancy. Despite popular myth, 
post-hysterectomy weight gain is not due to the hysterectomy, but is usually a 
result of other factors. A depressive emotional reaction to loss of the uterus 
is not uncommon or abnormal. Please discuss any concerns with your health care 
provider if persistent. Sexual response may change after hysterectomy. There are
no definitive studies showing decreased orgasmic potential post-hysterectomy. 
Some women have a heightened response due to correcting painful pathology. O
varian removal may decrease estrogenization, leading to vaginal dryness and 
menopausal hot flashes.Hormonal therapy may need to be discussed.  
**SOME SURGERIES HAVE SPECIAL CONSIDERATIONS**  
MAJOR  
* For all major surgeries listed above, you may drive after your 1week post-op 
visit if cleared, have discontinued narcotic pain meds, and are able to depress 
the brake quickly without pain. Long distance travel may be resumed in 4 weeks 
if stable.  
* With major robotic hysterectomy, you should refrain from intercourse for 8 
weeks, other hysterectomies 6 weeks.  
MINOR  
* Minor surgeries may drive next day any distance if off narcotic pain meds and 
are able to brake safely.  
* For minor vaginal surgeries for prolapse and / or urinary incontinence 
procedures, maintain pelvic rest for 4 weeks. Exercise and work may be resumed 
within 2-4 weeks depending on healing.  
* For minor surgeries of the vagina, uterus, and bladder, hysteroscopy, D&C, and
laparoscopy, maintain pelvic rest for 1-2 weeks depending on healing. Exercise 
and work may be resumed within the week.  
  
3  
  
CONSENT ITEMS REVISITED IN THE POST OPERATIVE PERIOD  
  
1. Physical and sexual activity will be restricted in varying degrees for an 
indeterminate period of time, but most often 2-8 weeks depending on the breadth 
of surgery. It is impossible to list everyundesirable effect and that the 
condition for which surgery is done is not always cured or significantly 
improved, and in rare cases may even worsen.  
2. There is no 100% guarantee that the planned surgery, despite everything being
done correctly andto the medico-surgical standard with resolve a condition 100% 
including but not limited to pain, urinary infections, bladder function, or 
defecatory dysfunction. Specifically, up to 20% of patients with abdominopelvic 
pain, 27% of those with UTI's, 16-26% with urinary incontinence or voiding 
dysfunction, and 40% with defecatory dysfunction may not see substantial long 
lasting improvement from a surgical intervention.  
3. Dangerous blood clots in the legs or lungs may occur post-operatively. 
Patients on chronic bloodthinners, particularly those without antidote, may be 
at significant risk of bleeding, hemorrhage, hematoma formation, need for 
transfusion, and death over the regular population. Life-threatening bleeding 
complications may even occur up to 4 weeks out of surgery even if 
anticoagulation is managed appropriately. If the patient has been taken off 
anticoagulation because of bleeding, this could expose her to life-threatening 
blood clots in the legs or lungs, MI, or stroke.  
4. Elderly patients over the age of 70 may experience up to a 2% mortality rate 
in the postsurgicalperiod related to comorbidities and declining health. A 
living will and is recommended to be reviewed.  
5. For major outpatient procedures requiring less than a 24 hour stay, you will 
be dismissed from the hospital or surgery setting when stable and meets criteria
for discharge. Less than 5% of patients may rebound to an emergency setting for 
some of the risks mentioned above even though they have met criteria for 
dismissal earlier. Outpatient surgical recovery usually occurs between 2 and 4 
weeks.For major inpatient procedures requiring an average 1-3 day hospital stay,
and the patient may not be fully recovered from major surgery for up to 6-8 
weeks. Please understand with Medicare and insurance reform, only 1 night will 
be approved for most reconstructive or robotic procedures.  
  
In regards to reconstructive pelvic and incontinence surgery:  
1. Approximately 85% of patients experience a reasonable improvement >5 years in
pelvic support and urinary/fecal incontinence, as well as urinary infections, 
after the procedure. There is a long-term risk of recurrent prolapse in up to 
30% of women who have undergone reconstructive surgery if healthy lifestyle 
behaviors are not maintained. This includes but is not limited to smoking 
cessation, weight loss in the obese, reduction in heavy lifting, exercise, fall 
prevention, and bowel regularity. Reconstructive pelvic and/or urinary/fecal 
incontinence surgeries may only improve your condition/s mildly and may not 
completely resolve problems including but not limited to urinary tract infect
ions and/or defecatory dysfunction. 17% of patients may still get a urinary 
tract infection and 40-60% of patients may still experience constipation 
postoperatively.  
2. It may be hard to urinate for a few days or weeks. Sometimes, up to 40% of 
women cannot urinate efficiently after surgery due to swelling, anesthesia, or 
pain. Prolonged urinary retention may rarely occur after an incontinence or 
pelvic prolapse surgery and is more likely if retention predates surgery or 
includes factors that are not limited to neuropathy, diabetes mellitus, or prior
pelvic surgery. The patient may need a catheter to drain the bladder for 1-2 
weeks on average. Patients in Dr. Tirado's practice most often prefer a 
transurethral Ruff. Other choices are a suprapubic catheter or intermittent 
self-catheterization. The patient has been given instructions on how to manage 
the type of catheter chosen, which will be reiterated postoperatively. Despite 
evidence indicating no need for antibiotics with a catheter, real world 
experience shows a 20-40% rate of UTI with a Ruff catheter which depending on 
personal risk factors and extent of surgery, may require a prophylactic anti
biotic afterwards. Antibiotics have risks of resistance, diarrhea and C. 
difficile infection. Suprapubic catheters carry a risk of urinoma, bowel injury,
and bleeding and have a UTI risk of 15-20%. Intermittent self-catheterization 
carries an 11% risk of a UTI.  
  
In regards to labial or perineal reconstructive surgery:  
1. You should expect some bruising and mild swelling with related discomfort 
following these surgeries that lasts 1-2 weeks. Ice packs and sitz baths may be 
utilized after surgery to help minimize swelling and discomfort. Mild analgesics
are also used. Final optimal results can usually be appreciated in several 
months. Most patients may return to work or school within a week after surgery; 
however, strenuous activity or tight clothing is discouraged and patients must 
refrain from sexual intercourse for 4-6 weeks after surgery.  
2. You may receive a topical antibiotic, which reduces risk of infection. Mild 
bleeding is not uncommon and can occur postoperatively if strenuous activity or 
intercourse is begun too early. Problemswith healing, such as incision 
separation, suture popping, scarring, and/or pain following the surgery are rare
but can happen.  
  
4  
SPECIAL INSTRUCTIONS  
  
IF MRSA POSITIVE  
* Continue Bactroban to the nares 2x / day for 2 weeks post-operatively. Since 
many people are colonized in the community, it is not something we will 
routinely culture for post-operatively.  
  
TO PREVENT BLOOD CLOTS IN THE LEGS OR LUNGS / PNEUMONIA IN THE LUNGS  
* Regular walking or calf stretches and wearing a supportive hose may help 
prevent clots in the legs or lungs.  
* If interested, home pneumatic cuffs are available for purchase through the 
office if you wish to continue these at home.  
* Take home your incentive spirometer and utilize it as instructed to prevent 
pneumonia. * Smoking cessation before and after surgery is strongly encouraged.  
VAGINAL BLEEDING & DISCHARGE  
* You will likely experience light bleeding with the potential for small dime 
size clots, lasting 2-3 weeks after your surgery. This should not be construed 
as heavy bleeding.  
* You may notice an increase in vaginal discharge 4-6 weeks after your surgery, 
which may be watery, yellowish, or pinkish. It may have more of an acidic odor. 
This is common as the vagina flushes out edematous body fluid and dissolves the 
absorbable sutures.  
* As the healing process progresses, you may experience mild itching within the 
vagina, as well as outside the vaginal opening. If this itching become severe, 
and/or is accompanied by a foul smell and swelling, please call the office.  
DRAINS & WOUND CARE  
* If a drain or specific wound care appliance is present at the time of 
dismissal, please follow additional instructions that may be provided regarding 
maintenance of the device/s.  
* Basic daily maintenance of a drain is as follows:  
- You may wash around the drain site with warm, soapy water and pat dry with a 
towel.  
- If indicated, use a topical antibiotic around the drain site 2x / day.  
- Strip or milk the drain from the drain site to the bulb suction 3x / day. This
clears any blockage from the drain.  
Simply pinch the drain at its insertion site into your body between your thumb 
and forefinger. Thensqueeze and pull the drainage tubing as you move towards the
drain bulb, allowing the tube to slidebetween your fingers with mild resistance,
you should see a suction effect within the drain tube that moves fluid toward 
the bulb.  
* Please call the office immediately if the drain falls out or cannot maintain 
suction.  
* Please call if redness of the drain site increases and is associated with 
fever, pain, or purulent discharge.  
CONSTIPATION  
* Patients are often constipated after a prolonged period of bed rest, or with 
the use of oral narcotic pain medications. If you have not had a bowel movement 
for 3 days after you are dismissed from the hospital, or are uncomfortable and 
unable to pass stool, please try one or all of the following measures in a 
stepwise manner:  
  
1. Eat fruits, vegetables, prunes & whole-grain foods. Drink 8 glasses of fluid 
daily. Add PlumSmart juice.  
2. Metamucil, FiberCon, or other bulking medication - use as directed  
3. Milk of magnesia - 30 mL s by mouth every 12 hours  
4. Dulcolax suppository - 1 suppository per rectum every 4-6 hours  
5. Fleets enema use as directed unless told nothing per rectum (colorectal 
fistula repair)  
  
BLADDER DRAINAGE  
There is a >70% chance you will void on your own post-operatively. In particular
to reconstructive and incontinence surgeries (whether you had incontinence 
before surgery or not), sometimes surgical effects of normal swelling and new 
positioning of the bladder may be associated with some mild tomoderate 
postoperative urinary leakage. Often this leakage is urge related. As discussed 
pre-operatively, up to 26% of patients with prolapse and negative urodynamic 
testing may develop de elvie incontinence afterwards. Please do not be frustrated
if temporary incontinence occurs for it will most likely improve as you continue
to heal. Leakage rarely persists long-term, there are many options for t
reatment, and Dr. Tirado and his office staff are there to help you every step of
the way.  
5  
[facility]  
  
The patient, ___________________________ (name), has been discharged from the 
hospital / surgery center on _________________________ (date) from her 
operation. The patient has been given the appropriate postoperative instructions
and they have been reviewed thoroughly with the patient and her family as 
necessary.  
The patient voices understanding and all questions have been answered to her and
/ or her family s satisfaction.  
  
Regarding her voiding ability at the time of discharge from the hospital:  
  
[ ]  
Patient was able to void on her own. (*RN to remove pages 7-16*)  
[ ]  
The patient was discharged with an indwelling Ruff catheter and will do 
clamping trials at home. She will need a voiding trial at the office in 1 week. 
(*RN to include pages 7-10,15,16 & remove pages 11-14*)  
[ ] The patient was discharged with an indwelling Ruff catheter and bladder 
rest. She will not undergo a voiding trial unless directed by Dr. Tirado at the 
first post-operative visit.  
  
(*RN to include pages 7-10,15,16 & remove pages 11-14*)  
[ ]  
Patient was taught intermittent self-catheterization.  
(*RN to include pages 7,8,11,15,16 & remove pages 9,10,12-14*)  
[ ]  
  
  
The patient was discharged with a suprapubic catheter. (*RN to include pages 
7-10,12-16 & remove page 11*)  
__________________________________________________ ____________  
RN Signature Date  
  
  
_________________________________________________ ____________  
Patient or Family Member Signature Date  
  
FAX COMPLETED FORM TO:  
Marquise Tirado   
FAX: [phone]  
  
(Patient Sticker Here) 6  
  
BLADDER DRAINAGE FOR THOSE PATIENTS REQUIRING A CATHETER  
  
Conversely, it may be hard for you to urinate for a few days or weeks. 
Sometimes, up to 30-40% of women cannot urinate efficiently after surgery due to
swelling or anesthesia. This may last a few hours to a few weeks.  
  
You may be required to use a catheter in your bladder to help it drain and 
retrain it to work properly. One kind of catheter, called a Transurethral Ruff 
Catheter, may be placed into the bladder through the urethra. This is usually 
reserved for large pelvic reconstructive surgeries and timely recovery. Some 
patients prefer to use a catheter intermittently to empty their bladder. This is
called Intermittent Self-Catheterization (ISC). It is an easy technique to learn
and helps to lessen the threat of urine infection. A third kind of catheter is 
called a Suprapubic Catheter (SPC). This catheter is placed through a small 
incision in the abdomen. A SPC is good for elderly patients or those whoare 
obese and may not be able to insert a catheter, or for complex bladder surgery 
requiring delayed recovery. Other tubes may help drain fluid from your 
incision/s.  
  
  
  
Unless instructed to maintain a catheter to drainage or to rest, you will begin 
plugging the end ofyour catheter with a small plastic plug in the hospital, and 
you will leave it plugged for set intervals of time. You will continue this upon
dismissal. While your catheter is plugged, the urine willnot drain out of it, 
and your bladder will fill like it always has naturally. You will be sent home
with a smaller day bag that straps to your leg for mobility plus a larger night 
bag to allow you torest all night without the need to drain the bag, and 
cleansing supplies.  
  
* If you have a Transurethral Ruff Catheter, plugging of the catheter helps to 
wake up the bladderand increases capacity. By the time you come in for your 1st 
postoperative visit, 95% of patients will be ready to pass a voiding trial and 
have the catheter removed successfully. During daytime, tryplugging until you 
are comfortably full, then drain your bladder through the catheter. Your goal 
should be 2-4 hours of plugging between bladder emptying. You may put your 
catheter to drainage at night.  
  
* If you are performing Intermittent Self-Catheterization, you should attempt to
urinate every houror so and then catheterize at the end of the time interval to 
measure how much urine is left in thebladder by emptying the catheter into the 
measuring hat.  
  
* If you have a Suprapubic Catheter, during the time that your catheter is 
plugged, you should attempt to urinate naturally every hour or so. At the end of
the time interval, measure how much urine is left in the bladder by emptying the
catheter into the measuring hat.  
  
  
  
7  
  
You will not begin progressing through the different intervals of the suprapubic
plugging or catheterization routine until you are able to urinate normally 
through your urethra. Until this happens, you will repeat the 4 hour interval 
over and over. When you have started urinating normally, you willmeasure how 
much is left in your bladder at the end of each interval and make a decision 
about whether you may progress to the next time interval. Please see the 
respective catheterization routine pertinent to the type of catheter that you 
may have.  
  
** Until the 12 hour interval is reached, the catheter should be hooked to the 
drainage bag every night to drain.  
** After the 1st successful 24-hour interval, please call the office to update 
one of the nurses.  
  
Troubleshooting the Catheter  
It is not uncommon for the transurethral or suprapubic catheter to leak around 
the urethra or skin incision respectively as the bladder gets too full. If you 
have problems with this type of leaking, drain your bladder through the 
catheter. If leakage continues, reconnect your catheter to the Ruff bag until 
the next morning, then restart the plugging routine. Please call the office with
continuedleakage problems.  
  
Removing the Catheter  
The catheter is held in place inside your bladder by a water-filled balloon. 
Cutting the collar (next to the end of the part you ve been plugging) will cause
the water inside the balloon to empty out, and the balloon will deflate. You may
then remove the catheter by pulling on it gently.  
  
Things to Remember  
* You do not need to measure how much you urinate, ever. You need only to 
measure how much is left in your bladder (which gets drained from the catheter) 
at the end of each plugging interval. This isknown as the residual urine. You do
not have to measure this residual every time you urinate normally; only at the 
end of the plugging interval.  
* You may find it easier to use the Ruff bag at night to collect your urine. 
Doing this prevents you from having to get up in the night to drain your 
bladder. If you use the Ruff bag, simply restart your most recent plugging 
interval when you wake up (for example, if you were at the 6 hour interval 
before bed, start at this interval routine in the morning.)  
* The site where a catheter is inserted into the urethra or abdomen may become 
pinkish, purplish, or develop a pus-like or crusty substance around it. These 
are your body s reactions to the catheter s presence, and are very normal. You 
may wash around the catheter daily with soap and water. Rinse and dry these 
areas well. You may shower while wearing the catheter. Maintain good personal 
hygiene. If this site develops a redness that spreads, or is warm and painful to
the touch, please call the office immediately.  
* When changing from one bag to another, using a clean leg bag or Ruff bag and 
an alcohol prep. Wash your hands thoroughly with soap and water and swab the end
of the drainage tubing that will be attached to the Ruff catheter. Disconnect 
the drainage bag from the Ruff catheter and put the bag aside. Attach a clean 
bag to the catheter. Wash your hands thoroughly afterwards.  
* To clean the bags simply empty the urine from the bag and leave the spout open
for cleaning. Mix a cup of vinegar and cup of cool water and flush the bag using
a 50 mL syringe, or by submerging thebag under the mixture. Once the bag is 
filled, close the spout and allow the liquid to stay in the bag for 30 minutes. 
Drain the cleaning solution and rinse the bag again with tap water. Hang dry 
with the cap off and the spout open. A commercially prepared urinary appliance 
 may also be usedas instructed.  
* It is important that when it is time to remove the catheter, do so in the 
morning, because you will need to urinate every 30-60 minutes on the day that 
you remove it. This is to keep the bladder empty and compressed, and to allow 
itself to heal. This is especially important for the suprapubic incision for 
those with suprapubic catheters.  
After removing the suprapubic catheter in particular, cover the site with a 
Band-Aid for the 1st 24-48 hours.  
* Drink at least 2 quarts of liquid a day. Avoid caffeinated drinks as they may 
irritate the bladder and cause bladder spasms.  
* Please remember to call the office once a week until the catheter is removed 
with an update on your progress.  
  
  
8  
HOW TO CARE FOR YOUR RUFF CATHETER FEMALE  
  
About this Topic: Ruff catheter is a thin, flexible tube that drains urine from
your bladder. The catheter connects to a special bag. The bag holds the urine 
until you are able to empty the bag. Youmay need to have a catheter for a short 
time. You may need a catheter after you are sick or have had surgery. Sometimes 
a catheter is used for a long time.  
  
What Will the Results Be: Your urine will drain and you will prevent infection.  
  
  
  
What Care is Needed at Home?  
; Ask your doctor what you need to do when you go home. Make sure you ask 
questions if you do not understand what the doctor says. This way you will know 
what you need to do.  
; Your doctor may order a home health nurse to come to your house to help you 
learn to care for your catheter.  
; Prevent infections:  
; Wash your hands before and after handling your catheter.  
; If you switch between a leg bag and an overnight drainage bag, be sure you 
clean the connection between the catheter and the bag before you switch bags. 
Ask your doctor what to use to clean the connection.  
; Place a cap on the drainage bag you are not using and store in a clean towel.  
; Rinse the empty drainage bag not in use with 1 cup vinegar mixed with 1 cup 
water.  
; Care for the tube:  
; Wash the skin around the catheter with soap and water each day. Pat the skin 
dry.  
; Do not put anything on the tube.  
; Keep the tube secure. Do not let the tube pull or catch when you are moving 
around during the day.  
; Do not let the tube kink or loop.  
; Care for the Drainage Bag:  
; Keep your urine bag below your bladder.  
; Drain the bag often to help keep you from getting an infection.  
; Wear cotton underwear.  
; What Follow-Up Care Is Needed? Your doctor may ask you to make visits to the 
office to check on your progress. Be sure to keep these visits.  
;  
;  
  
;  
;  
9  
  
; What Lifestyle Changes are Needed?  
; Drink 6-8 glasses of water every day.  
; Take showers rather than soaking in a bath.  
  
; Will Physical Activity Be Limited?  
; Talk to your doctor about what you can and cannot do when the catheter is in 
place.  
  
; What Problems Could Happen?  
; The catheter has a balloon to hold it inside the bladder. The balloon can 
break or leak and the catheter can fall out.  
; Urine flow stops or is blocked by kinks or bends in the tube or if the drain 
bag is kept higher than your bladder.  
; You may see blood in the collecting tube or bag.  
; Bladder infection.  
  
; When Do I Need To Call the Doctor?  
; Signs of infection like a fever of 100.4F (38C) or higher, chills, pain around
the catheter, redness or swelling of the skin around the catheter.  
; Urine has blood and it is dark or coffee colored, or is pus-like.  
; Tube comes out or urine stops flowing.  
; Burning or painful feeling in your bladder.  
; You are not feeling better in 2-3 days or you are feeling worse.  
  
; Teach Back: Helping You Understand  
; The Teach Back Method helps you understand the information we are giving you. 
The idea is simple.After talking with the staff, tell them in your own words 
what you were just told. This helps to make sure the staff has covered each 
thing clearly. It also helps to explain things that may have lewis bit 
confusing. Before going home, make sure you are able to do these:  
; I can tell you about my condition.  
; I can tell you how to prevent infection and care for the tube and bag of my 
Ruff catheter.  
; I can tell you what I will do if my urine stops flowing or there is a burning 
or painful feeling in my bladder.  
  
  
  
  
  
  
  
  
  
  
  
  
  
  
  
  
  
  
10  
  
Intermittent Self-Catheterization (ISC) Instructions  
  
After your operation, you may experience swelling around the bladder and 
urethra. Swelling may limit the ability to pass urine naturally through your 
urethra. Intermittent Self-Catheterization (ISC) will allow you to pass your 
urine until this swelling subsides. This technique prevents discomfort from 
bladder over distention.  
  
By following the simple routine below, you will be able to use ISC to empty your
bladder until yournatural ability to pass urine gradually returns. You will find
that as your ability to pass urine naturally improves, the need for ISC will be 
less. On average, a woman should expect to have use ISC from 2 to 4 weeks. You 
may reuse catheters by cleaning them in hot soapy water if low on supplies.  
  
1. Attempt to pass urine naturally as often as you like.  
At first, do not be surprised if you cannot pass urine in very small amounts.  
  
2. Try to void naturally immediately before each self-catheterization.  
3. During the day, self-catheterize every 3 - 4 hours to ensure bladder 
emptying.  
  
Use your non-dominant hand to gently spread your labia and wipe your urethra 
with a soap wipe.  
Use your dominant hand to introduce the catheter into the urethra until urine 
flow starts and then stops.  
  
4. Measure the residual urine drained out of the catheter at the end of each 
interval.  
Use the measuring hat. If your catheterized amount is 400 ml or more, self-
catheterize more often (every 2 -3 hours).  
  
5. Start out with a self-catheterization schedule of every 3 - 4 hours if 
possible.  
This is equivalent to self-catheterizing 6 - 8 times per day.  
  
6. When the amount catheterized is less than 150 ml on 2 occasions, increase 
your catheterization interval to every 6 hours, or  
4 times per day.  
  
7. When the amount catheterized is less than 150 ml on 2 occasions, increase 
your catheterization interval to every 8 hours, or 3 times per day.  
8. When the amount catheterized is less than 150 ml on 2 occasions, increase 
your catheterization interval to every 12 hours, or 2 times per day.  
9. When the amount catheterized is less than 150 ml on 2 occasions, increase 
your catheterization interval to every 24 hours, or 1 time per day.  
10. Once the amount catheterized at the 24 hour interval is less than 150 ml on 
1 occasion, self-catheterization may be stopped.  
  
SPECIAL INSTRUCTIONS  
  
* Call Dr. Tirado's office at [phone] on a weekly basis to report to Vanessa on
your progress.  
* Call Dr. Tirado with the following problems:  
1. Symptoms of a urinary infection (pain, burning, urgency, frequency, or 
blood).  
2. Fever greater than 100.4 F on 2 occasions 4 hours apart.  
3. The inability to pass urine through the catheter.  
  
  
  
11  
  
  
Suprapubic Catheter Instructions  
  
After your operation, you may experience swelling around the bladder and 
urethra. Swelling may limit your ability to pass urine naturally through your 
urethra. The suprapubic catheter (SPC) placed into your pubic hair area allows 
you to pass urine until swelling subsides. The SPC prevents discomfort from 
bladder over distention. The SPC site may be cleaned with soap and water. The 
SPC tucks discreetly into your underpants.  
  
By following the simple routine below, you will be able to use the SPC to empty 
your bladder until your natural ability to pass urine gradually returns. You 
will find that as your ability to pass urine naturally improves, the need to use
your SPC will be less. On average, a woman should expect to have the SPC from 2 
to 4 weeks.  
  
1. Attempt to pass urine naturally as often as you like.  
At first, do not be surprised if you cannot pass urine in very small amounts.  
  
2. Try to void naturally immediately before each unplugging of the SPC.  
3. During the day, unplug your SPC every 3 - 4 hours to ensure bladder emptying.  
So you can sleep well at night, connect your SPC to the night bag.  
  
4. Measure the residual amount drained out of the SPC at the end of each 
interval.  
Use the measuring hat. If your SPC amount is 400 cc or more, unplug more often 
(every 2 -3 hours).  
  
5. Start out with an unplugging interval of every 3 - 4 hours if possible.  
This is equivalent to unplugging 6 - 8 times per day.  
  
6. When the amount unplugged is less than 150 ml on 2 occasions, increase your 
unplugging interval to every 6 hours, or 4 times per day.  
7. When the amount unplugged is less than 150 ml on 2 occasions, increase your 
unplugging interval to every 8 hours, or 3 times per day.  
At the 8 hour interval, you no longer need to connect your SPC to the night bag.  
  
8. When the amount unplugged is less than 150 ml on 2 occasions, increase your 
unplugging interval to every 12 hours, or 2 times per day.  
9. When the amount unplugged is less than 150 ml on 2 occasions, increase your 
unplugging interval to every 24 hours, or 1 time per day.  
10. Once the amount unplugged at the 24 hour interval is less than 150 cc on 1 
occasion, the SPC may then be removed.  
  
SPECIAL INSTRUCTIONS  
  
* Call Dr. Tirado's office at [phone] on a weekly basis to report on your 
progress or to arrange for removal of the SPC.  
  
* Call Dr. Tirado with the following problems:  
1. Symptoms of a urinary infection (pain, burning, urgency, frequency, or 
blood).  
2. Fever greater than 100.4 F on 2 occasions 4 hours apart.  
3. The inability to pass urine through the SPC.  
  
12  
  
HOW TO CARE FOR YOUR SUPRAPUBIC URINARY CATHETER  
  
About this Topic: A suprapubic urinary catheter is a bendable rubber tube. It is
put into an opening called a stoma. This is made in the lower belly wall that 
goes straight into the bladder. A bulb at the end of the tube sits against the 
bladder wall and keeps the tube from moving out of place. Thecatheter is used to
drain urine from the bladder when the bladder is not working the right way or is
blocked. This type of catheter may have less chance of infection.  
  
General: The skin near the stoma should be cleaned every day and checked for 
signs of infection. These include redness, swelling, pus, and soreness.  
  
Cleaning the Skin Near the Stoma: You may take showers, but check with your 
doctor about bathing, hot tubs, and swimming pools. Avoid using creams, powders 
or sprays near the tube site.  
  
; Get the Things You Will Need:  
; Gloves * Warm water and non-scented soap  
; Clean, dry washcloth and towel * Clean gauze bandage  
; Surgical tape * Plastic Trash Bag  
; Wash your hands with soap and water.  
; Put on clean gloves  
; Hold the skin on all sides of the stoma. Gently take off the bandage. Be 
careful not to pull out the catheter. Throw the bandage away in the trash bag.  
; Check for redness or swelling, colored or foul-smelling urine or drainage, or 
skin changes. Thesemay be signs of an infection. Call your doctor.  
; Hold the end of the catheter tube while cleaning.  
; Wash the base of the catheter and the skin near the stoma with warm soap and 
water. Wash away from the stoma, gently pat dry with a towel.  
; Secure the catheter with a clean gauze bandage and tape.  
  
Changing the Catheter:  
; Get the Things You Will Need:  
; Drainage bag * Sterile gloves  
; Syringe to remove water in the catheter balloon * Clean sterile gauze bandage  
; Surgical tape * Plastic trash bag  
; Wash your hands with soap and water before and after changing the catheter.  
; Put on clean gloves.  
; Take off the old bandage or dressing and throw in the trash bag.  
; Clean the skin at the site.  
; Remove and throw away your gloves.  
; Open packages carefully before putting on sterile gloves so you do not infect 
yourself once gloved. Be careful not to contaminate the sterile items inside the
packages, mainly the new catheter. It is very helpful to have someone help you.  
; Put on sterile gloves and take care to keep things sterile at all times when 
working with the newcatheter.  
; Fix the new catheter as you have been trained.  
; Using the syringe, remove water from the old catheter bulb.  
; Keep your fingers close to the site. Gently pull the catheter until it comes 
out. You can tell how far in the new catheter should go from the length of the 
catheter you took out.  
; Clean the skin at the site again.  
  
13  
  
; Make sure the drainage bag is attached to the new catheter.  
; Gently put the new catheter in as far as the old one had been.  
; Once urine begins to flow, inflate the bulb with about 8-10 mL water or the 
amount given on the package. If you have pain or feel resistance, withdraw the 
catheter a little and try again. If you cannot get the catheter in, cover the 
opening and call your caregiver right away.  
; Secure the catheter with a new bandage. Tubing should be loose so it does not 
pull on the catheter.  
; Throw away your gloves and any old catheter supplies.  
  
What Problems Could Happen?  
; Infection * Bleeding  
; Kidney damage * Bladder injury  
; Bladder stones * Catheter gets displaced causing a leak or blockage  
  
When Do I Need to Call the Doctor?  
; Signs of infection these include a fever of 100.4F (38C) or higher, chills, 
stomach pain.  
; Signs of wound infection  these include swelling, redness, warmth around the 
catheter site, too much pain when touched, yellowish/greenish or bloody 
discharge, foul smell coming from the site.  
; Cloudy or foul smelling urine.  
; Blood in urine.  
; Stones or sediment in the tubing or drainage bag.  
; Soreness near the tube.  
; Very bad bladder pain or spasms.  
; Urine leaking around the tube.  
; No urine flowing into the bag; this could be a sign that the tube is clogged.  
; Tubing comes out.  
  
Helpful Tips:  
; To lower your chance if infection or other problems:  
; Always wash your hands before and after you care for your catheter.  
; Check your catheter often to be sure it is in the right position and secure to
avoid leakage at the stoma.  
; Keep the urine drain bag below the level of your bladder.  
; Make sure to keep the tubing free of kinks so the urine flows freely.  
; Avoid wearing tight-fitting clothing over the tubing that could block the flow
of urine.  
; Do not re-use single use drainage bags.  
; Drink lots of liquids each day; avoid caffeine drinks and beer, wine, and 
mixed drinks (alcohol) which may bother the bladder.  
; Eat lots of fiber each day to avoid hard stools. This will help to avoid a 
blockage and bladder pressure.  
  
  
14  
  
RECORD FOR VOIDED AND POSTVOID AMOUNTS  
Please record the time you void, the amount voided, and the amount left in your 
bladder after you empty it with your catheter.  
  
Date/Time  
Voided Amount Post Void Residual Amount  
Date/Time  
Voided Amount Post Void Residual Amount  
  
  
  
  
  
  
  
  
  
  
  
  
  
  
  
  
  
15  
  
[facility] FOR  
UROGYNECOLOGY & WOMEN S HEALTH  
  
  
HOME SUPPLY LIST  
  
Please contact your local pharmacy or medical supply store regarding supplies 
for the type of catheter you may have.  
  
Transurethral Ruff catheter  
1 Night bag and 1 Day bag with leg strap  
Lubricating jelly  
Alcohol wipes  
Measuring had for toilet seat  
5 50 mL syringes  
  
Self-Intermittent Catheterization Supplies:  
100 #11-French female catheters (hydrophilic if possible)  
Lubricating jelly  
Alcohol wipes  
Measuring hat for toilet seat  
Hand-held mirror  
  
Suprapubic catheter kit  
1 Roll Transpire tape 1 inch 10 yards  
1. Box of 25 drain dressings, precut, 6 pi 4 x 4  
1 package catheter plugs  
1. 2000 mL urinary drainage bag  
1 day bag with leg strap  
1 60 mL syringe catheter tip  
1 Quart vinegar  
  
You may obtain the above supplies from the Pharmacy Counter at their 3 
locations.  
  
[facility] [place] [id number] [place] [address] [place] [address]  
[phone]  
  
  
Store hours:  
Monday through Friday 9 AM to 6 PM  
Saturday 9 AM to 1 PM  
 closed  
  
16  
  
  
  
5  
[facility] UROGYNECOLOGY & WOM[facility] S [facility]  
  
The patient, ___________________________ (name), has been discharged from the 
hospital / surgery center on _________________________ (date) from her 
operation. The patient has been given the appropriate postoperative instructions
and they have been reviewed thoroughly with the patient and her family as 
necessary.  
The patient voices understanding and all questions have been answered to her and
/ or her family s satisfaction.  
  
Regarding her voiding ability at the time of discharge from the hospital:  
  
[ ]  
Patient was able to void on her own. (*RN to remove pages 7-16*)  
[ ]  
The patient was discharged with an indwelling Ruff catheter and will do 
clamping trials at home. She will need a voiding trial at the office in 1 week. 
(*RN to include pages 7-10,15,16 & remove pages 11-14*)  
[ ] The patient was discharged with an indwelling Ruff catheter and bladder 
rest. She will not undergo a voiding trial unless directed by Dr. Tirado at the 
first post-operative visit.  
  
(*RN to include pages 7-10,15,16 & remove pages 11-14*)  
[ ]  
Patient was taught intermittent self-catheterization.  
(*RN to include pages 7,8,11,15,16 & remove pages 9,10,12-14*)  
[ ]  
  
  
The patient was discharged with a suprapubic catheter. (*RN to include pages 
7-10,12-16 & remove page 11*)  
__________________________________________________ ____________  
RN Signature Date  
  
  
_________________________________________________ ____________  
Patient or Family Member Signature Date  
  
FAX COMPLETED FORM TO:  
Marquise Tirado DO  
FAX: [phone]  
  
  
  
  
  
  
[facility]  
Work Phone: [phone]- History of Present illness Narrative* Cee Arthur RN - 2021 11:07 AM EST  
  
Formatting of this note might be different from the original.  
Patient completed double void with an output of 325. Bladder scan shows 71 ml 
after void. Patient will go home after lunch.  
  
  
  
Electronically signed by Cee Arthur RN at 2021 11:08 AM EST  
  
  
* Marquise Tirado DO - 2021 5:57 AM EST  
  
Formatting of this note is different from the original.  
Urogynecology Progress Note  
  
Date: 2021 Time: 5:57 AM  
  
[facility] 71 y.o. female POD # 1 s/p robotic sacrocolpopexy, anterior 
and posterior repairs, cystoscopy on 21on 21  
  
Patient seen and examined. She complained of nothing at this time. Nursing was 
concerned as patientappeared confused and was not oriented this morning around 
0345. Patient is currently alert and oriented to person, place and time. Her 
pain is controlled. Patient is tolerating oral intake. She is urinating via 
ruff. She report minimal vaginal bleeding. She is ambulating without 
difficulty. She is not passing flatus. She denies Fever/Chills, Chest Pain, SOB,
N/V, Calf Pain.  
  
The patient recalls from preoperative counseling and accepts that up to 26% of 
women may persist inthese symptoms or develop de elvie incontinence. If 
preoperative bladder testing was negative, therewas no indication for an 
incontinence procedure. The patient understands if these symptoms persist,
further treatment may be recommended.  
  
Vitals:  
Vitals:  
21 1845 21 1915 21 2300 21 0327  
BP: 112/61 116/60 102/79  
Pulse: 88 71 92  
Resp: 16 17 17 22  
Temp: 98.2 F (36.8  C) 97.7 F (36.5 C)  
TempSrc: Oral Axillary  
SpO2: 97% 99% 98%  
Weight: 114 lb 13.8 oz (52.1 kg)  
Height:  
  
Intake/Output:  
Last Shift: I/O last 3 completed shifts:  
In: 2540 [P.O.:240; I.V.:2300]  
Out: 625 [Urine:525; Blood:100]  
Current Shift: I/O this shift:  
In: -  
Out: 1300 [Urine:1300]  
300 mL out in last 1.25 hrs ~ 240 mL/hr  
  
Physical Exam:  
General: no apparent distress, alert and cooperative  
Neurologic: alert, oriented, normal speech, no focal findings or movement 
disorder noted  
Lungs: No increased work of breathing, good air exchange, clear to auscultation 
bilaterally, no crackles or wheezing  
Heart: normal S1 and S2, regular rate and rhythm and no murmur, rubs, or gallops  
Abdomen: soft, non-distended, appropriate tenderness, no CVA tenderness, normal 
bowel sounds  
Incision: clean, dry, intact with dermabond  
Extremities: no calf tenderness, non edematous, SCDs in place  
Genitourinary: A direct genitourinary exam is unremarkable with normal 
abdominopelvic findings. External vulvovaginal exam reveals no bleeding, 
abnormal swelling or symmetry, infection, or discharge  
  
Lab:  
No results found for this or any previous visit (from the past 12 hour(s)).  
  
Assessment/Plan:  
Payton Bravo 71 y.o. female POD # 1 s/p robotic sacrocolpopexy, anterior 
and posterior repairs, cystoscopy on /21on 21  
- Doing well, vitals stable  
- Ruff catheter to be removed, will complete voiding trial this AM, UOP 
adequate  
- Discontinue IVF  
- Encourage ambulation and use of incentive spirometer  
- Pain control: Motrin/Tylenol. Will hold Norco for now as patient is very 
sensitive to opioid painmedication.  
- Labs: CBC, BMP in the process currently, will review once they have resulted  
- DVT Proph: Lovenox 40mg to be given on POD#1 AM if Hgb >8  
- Abx: Ancef x24h  
- Diet: General diet  
- Continue post-op care, please page with any questions  
- Likely discharge home later today  
  
AM rounds. Patient doing well. Pain controlled. Mild dilerium recognized by 
patient. Encouraged only NSAIDS and Protein shakes for better nutrition. 
Encouraged prunes for bowels. Voiding trial this AM. Labs stable and UO 
adequate. Patient very appreciative and can tell difference with support. MDM/P
OC updated with team. Instructions given and understood. Video links given. DC 
later today. CB Branch MD  
Ob/Gyn Resident   Pager: [phone]  
2021, 5:57 AM  
  
  
  
  
  
Electronically signed by Marquise Tirado DO at 2021 6:49 AM EST  
  
  
* Niurka Mcmullen RN - 2021 4:13 AM EST  
  
Formatting of this note might be different from the original.  
Nurse observed patient out of the bed trying to un tangle the cords. When asked 
what the pt was doing, the pt stated she was trying to get untangled. Nurse 
asked patient where she was pt stated her address at home. Pt was very 
uncooperative and didn't want to get back in the bed. Nurse had to keep r
eminding the pt that she was at the hospital and that she just had surgery. 
Vitals were taken, all WNL. There was some small amount of blood on bed pad. Pt 
was placed back into bed and fell back to sleep. Nurse contactd Dr. Gabi Pino
 to let her know pt status, she stated to continue to monitor pt and let her 
know of any changes. Will continue to monitor  
  
  
  
Electronically signed by Niurka Mcmullen RN at 2021 4:18 AM EST  
  
  
* Sarah Fritsch, RN - 2021 7:04 PM EST  
  
Formatting of this note might be different from the original.  
rn entered room at 1730 and pt was non responsive and dusky. RN attempted to 
wake pt with no success. Pulse was found, rapid response called. Pt placed on 
non rebreather, Narcan given, pt came too and was responsive, color returned. Pt
 vitals stable.  
Will continue to monitor  
  
  
  
Electronically signed by Sarah Fritsch, RN at 2021 7:08 PM EST  
  
  
* Marquise Tirado DO - 2021 6:02 PM EST  
  
Formatting of this note is different from the original.  
Gynecology Progress Note  
  
Date: 2021 Time: 6:02 PM  
  
Payton Bravo 71 y.o. female, POD #0 s/p robotic sacrocolpopexy, anterior 
and posterior repairs, cystoscopy on 21on 21  
  
Patient seen and examined. She complained of nothing. Pain is controlled. 
Patient is tolerating oral intake. She is urinating via ruff. She reports 
minimal vaginal bleeding. She is not yet ambulating but plans to do so later 
today. She is not passing flatus. She denies Fever/Chills, Chest Pain, SOB, N/V,
 Calf Pain.  
  
The patient recalls from preoperative counseling and accepts that up to 26% of 
women may persist inthese symptoms or develop de elvie incontinence. If 
preoperative bladder testing was negative, therewas no indication for an 
incontinence procedure. The patient understands if these symptoms persist,
further treatment may be recommended.  
  
Vitals:  
Vitals:  
21 1605 21 1610 21 1615 21 1634  
BP: (!) 154/83 (!) 147/84 134/73 133/69  
Pulse: 67 68 63 66  
Resp: 17 17 11 16  
Temp: 97.7 F (36.5 C)  
TempSrc: Oral  
SpO2: 100% 100% 99% 98%  
Weight:  
Height:  
  
Intake/Output:  
Last Shift: I/O last 3 completed shifts:  
In: 1500 [I.V.:1500]  
Out: 100 [Blood:100]  
Current Shift: I/O this shift:  
In: 1040 [P.O.:240; I.V.:800]  
Out: 125 [Urine:125]  
500 mL out in last 1.5 hrs ~ 333 mL/hr  
  
Physical Exam:  
General: no apparent distress, alert and cooperative  
Neurologic: alert, oriented, normal speech, no focal findings or movement 
disorder noted  
Lungs: No increased work of breathing, good air exchange, clear to auscultation 
bilaterally, no crackles or wheezing  
Heart: normal S1 and S2, regular rate and rhythm and no murmur, rubs, or gallops  
Abdomen: soft, non-distended, appropriate tenderness, no CVA tenderness, 
hypoactive bowel sounds  
Incision: clean, dry, intact and dressed with skin adhesive  
Extremities: no calf tenderness, non edematous, SCDs  
Genitourinary: A direct genitourinary exam is unremarkable with normal 
abdominopelvic findings. External vulvovaginal exam reveals no bleeding, 
abnormal swelling or symmetry, infection, or discharge  
  
Lab:  
No results found for this or any previous visit (from the past 12 hour(s)).  
  
Assessment/Plan:  
Payton Bravo 71 y.o. female, POD #0 s/p robotic sacrocolpopexy, anterior 
and posterior repairs, cystoscopy on 21  
- Doing well, vitals stable  
- Continue ruff catheter will complete voiding trial in the AM, UOP adequate  
- Continue IVF @100ml/hr  
- Encourage ambulation and use of incentive spirometer  
- Pain control: Morphine PRN and transition to oral pain medications Motrin and 
Norco for breakthrough pain  
- Labs: CBC, BMP in the AM  
- DVT Proph: Lovenox 40mg on POD#1 AM if Hgb >8  
- Abx: S/p Gent/Clinda, Will continue clinda x24h per SCIP protocol  
- Diet: Advance as tolerated to general diet  
- Path: pending  
- Continue post-op care, please page with any questions  
  
PM rounds updated by phone. Patient sensitive to narcotics - will limit. Patient
 doing well now. MDM/POC updated. [facility]  
  
Will update Dr. Tirado.  
  
Gabi Branch MD  
Ob/Gyn Resident   Pager: [phone]  
2021, 6:02 PM  
  
  
  
  
Electronically signed by Marquise Tirado DO at 2021 6:46 AM EST  
  
  
* Gabi Branch MD - 2021 5:48 PM EST  
  
Formatting of this note is different from the original.  
Urogynecology Resident Interval Note  
  
Rapid response was called. Patient seen and evaluated at bedside with senior 
resident and Dr. Matson. Patient minimally responsive. Vitally stable. Lungs 
clear bilaterally. It was presumed that she received too much opioid pain 
medication. Narcan given at bedside. Patient regained consciousness. Alert and 
oriented x3. Vitals continued to be stable.  
  
Vitals:  
21 1605 21 1610 21 1615 21 1634  
BP: (!) 154/83 (!) 147/84 134/73 133/69  
Pulse: 67 68 63 66  
Resp: 17 17 11 16  
Temp: 97.7  F (36.5 C)  
TempSrc: Oral  
SpO2: 100% 100% 99% 98%  
Weight:  
Height:  
  
Physical exam  
General appearance minimally responsive, only to sternal rub. AAOx3 after narcan
 given  
HEENT: head atraumatic, normocephalic, pinpoint pupils bilaterally, moist mucous
 membranes, tracheamidline  
Neurologic: no focal findings or movement disorder noted  
Lungs: No increased work of breathing, good air exchange, clear to auscultation 
bilaterally, no crackles or wheezing  
Heart: regular rate and rhythm and no murmur  
Abdomen: soft, gravid, and non-tender, incisions clean, dry and intact with 
dermabond  
Extremities: no calf tenderness, non edematous  
  
Payton Redford 71 y.o. female, POD #0 s/p robotic sacrocolpopexy, anterior 
and posterior repairs, cystoscopy on 21on 21  
  
Opioid intoxication  
- Will use primarily Toradol/Tylenol for pain control with minimal opioid 
medication  
  
Dr. Tirado updated and in agreement with plan  
  
Gabi Branch MD  
OB/GYN Resident, PGY1  
[place], [place]  
2021, 5:48 PM  
  
  
  
  
  
Electronically signed by Gabi Branch MD at 2021 6:02 PM EST  
  
  
* Regina Perez RN - 2021 4:17 PM EST  
  
Formatting of this note might be different from the original.  
DAY OF SURGERY/PROCEDURE  
GUIDELINES  
  
As a patient at the [facility], you can expect quality 
medical and nursing care that is centered on your individual needs. It is our 
goal to make your surgical experience as comfortable and excellent as possible.  
________________________________________________________________________  
  
The following instructions are general guidelines, if any information on this 
sheet is different from what your doctor has instructed you to do, please follow
 your doctor's instructions.  
  
Please arrive on  1100  
  
Enter through entrance C. Check in at registration  
  
Upon arrival you will be taken to the pre-operative area to get ready for 
surgery, your family willstay in the waiting room and visit with you once you 
are ready for surgery. Due to special limitations please limit visitation to 1-2
 members of your family at a time. When it is time for surgery your family will 
return to the waiting room.  
  
You will be given a specific time when you will NOT be able to eat, drink, 
smoke, suck or chew ANYTHING (no water, gum, mints, cigarettes, cigars, pipes, 
snuff, chewing tobacco, etc.) or your surgerymay be canceled. This will occur 
during your PAT appointment or by phone 1-2 days before surgery  
  
Take a shower or bath on the morning of your surgery/procedure (Hibiclens if 
directed)  
  
Brush your teeth, but do not swallow any water  
  
IN CASE OF ILLNESS - If you have a cold or flu symptoms (high fever, runny nose,
 sore throat, cough, etc.) rash, nausea, vomiting, loose stools, and/or recent 
contact with someone who has a contagious disease (chick pox, measles, etc.) 
please call your doctor before coming to the surgery center  
  
If applicable bring your:  
Inhaler (s)  
Hearing aid(s)  
Eyeglasses and Case (If you wear contacts they have to be removed before 
surgery, bring case and solution)  
CPAP  
  
DO NOT take anticoagulants (blood thinners, aspirin or aspirin-containing 
products) as instructed by your physician.  
  
Wear loose, comfortable clothing that is easy to put on and take off. They will 
remain in post-op with the nurse.  
  
If you will be returning home the same day as your surgery, you will need to 
have a responsible adult (18 years of age or older) present to drive you home. 
You will need someone stay with you at homefor the first 24 hours following your
 surgery. This is due to the anesthesia and the medication given to you during 
surgery and recovery.  
  
  
  
  
  
Electronically signed by Regina Perez RN at 2021 4:18 PM EST  
  
  
documented in this encounterCasualing Phone: [phone] note*   
  
                                                    Diagnosis  
   
                                                      
  
  
Post-op pain- Primary  
  
  
Other acute postoperative pain  
   
                                                      
  
  
S/P sacrocolpopexy  
  
documented in this encounter  
[facility] Phone: [phone] noteNo [facility]No[facility]  
Other Phone: [phone] note*   
  
                                                    Diagnosis  
   
                                                      
  
  
Polyp of colon  
  
  
Benign neoplasm of colon  
   
                                                      
  
  
Benign neoplasm of transverse colon  
   
                                                      
  
  
Unspecified asthma, uncomplicated  
   
                                                      
  
  
Dermatitis, unspecified  
   
                                                      
  
  
Personal history of other malignant neoplasm of skin  
   
                                                      
  
  
Personal history of nicotine dependence  
   
                                                      
  
  
Allergy status to penicillin  
   
                                                      
  
  
Benign neoplasm of colon, unspecified  
   
                                                      
  
  
Encounter for screening for malignant neoplasm of colon  
  
documented in this encounter  
[facility]  
Work Phone: [phone] note*   
  
                                                    Diagnosis  
   
                                                      
  
  
Encounter for screening for malignant neoplasm of colon- Primary  
   
                                                      
  
  
Polyp of colon  
  
  
Benign neoplasm of colon  
  
documented in this encounter  
[facility]  
Work Phone: [phone] note*   
  
                                                    Diagnosis  
   
                                                      
  
  
Encounter for screening for malignant neoplasm of colon- Primary  
   
                                                      
  
  
Polyp of colon  
  
  
Benign neoplasm of colon  
  
documented in this encounter  
[facility]  
Work Phone: [phone] note*   
  
                          Diagnosis    Onset Date   Resolution   Status  
   
                          Chronic pain                           acute  
   
                          Lumbosacral spondylosis                           acut  
e  
   
                          Sacroiliitis                           acute  
  
  
[facility]  
Work Phone: [phone] note*   
  
                                                    Diagnosis  
   
                                                      
  
  
Encounter for subsequent annual wellness visit (AWV) in Medicare patient- 
Primary  
   
                                                      
  
  
Encounter for screening mammogram for malignant neoplasm of breast  
   
                                                      
  
  
Raynaud's phenomenon without gangrene  
   
                                                      
  
  
Ulcerative colitis without complications, unspecified location (CMS/HCC)  
   
                                                      
  
  
Open wound of left lower leg, sequela- Primary  
  
documented in this encounter  
[facility]   
  
                                                    Diagnosis  
   
                                                      
  
  
Encounter for subsequent annual wellness visit (AWV) in Medicare patient- 
Primary  
   
                                                      
  
  
Encounter for screening mammogram for malignant neoplasm of breast  
   
                                                      
  
  
Raynaud's phenomenon without gangrene  
   
                                                      
  
  
Ulcerative colitis without complications, unspecified location (CMS/HCC)  
   
                                                      
  
  
Open wound of left lower leg, sequela- Primary  
   
                                                      
  
  
Open wound of left lower leg, sequela- Primary  
   
                                                      
  
  
Ulcerative colitis, unspecified, without complications (CMS/HCC)  
   
                                                      
  
  
Acute non-recurrent maxillary sinusitis  
  
documented in this encounter  
[facility]Reason for [facility] Narrative* Auth/Cert  
  
                          Specialty    Diagnoses / Procedures Referred By Contac  
t Referred To Contact  
   
                                                              
  
  
Diagnoses  
  
  
Cystocele, unspecified  
  
  
Rectocele  
  
  
Vaginal prolapse  
  
  
CYSTOCELE  
  
  
RECTOCELE  
  
  
VAGINAL PROLAPSE  
  
  
  
Procedures  
  
  
GA LAPAROSCOPY, SURG,   
COLPOPEXY  
  
  
GA CYSTOURETHROSCOPY  
  
  
GA OFFICE/OUTPT   
VISIT,PROCEDURE ONLY  
  
  
SACRAL COLPOPEXY ROBOTIC   
WITH RESTORELLE MESH  
  
  
CYSTOSCOPY  
  
  
possible VAGINAL REPAIR   
ANTERIOR AND POSTERIOR                    
  
  
Marquise Tirado DO  
  
  
[address]  
  
  
[address]  
  
  
[place] [address]  
  
  
Phone: [phone]  
  
  
Fax: [phone]                         
  
  
[facility] [address]  
  
  
[place] [address]  
  
  
Phone: [phone]  
  
  
  
                Referral ID Status  Reason  Start Date Expiration Date Visits Re  
quested Visits   
Authorized  
   
                [id number]                                 1       1  
  
  
  
Casualing Phone: [phone]  
  
Summary Purpose  
  
  
                                                      
  
  
  
Family History  
No Family History Records Found  
  
                          Relationship Condition    Age at Onset Recorded Date/T  
ge  
   
                          Not Specified Malignant neoplasm of pancreas Unknown    
      
   
                                       Malignant neoplasm of colon Unknown        
   
                          family member Malignant neoplasm of colon Unknown       
   
  
  
  
Advance Directives  
No Advanced Directives Records FoundLatest Code Status on File  
  
                          Code Status  Date Activated Date Inactivated Comments  
   
                          Full Code    2021 4:35 PM                
  
  
  
                                Advance Directive Response        Recorded Date/  
Time  
   
                                Advance Directives No              May 16th, 202  
3 10:29am  
  
  
  
Hospital Course  
  
  
                                                    Note  
   
                                                    [facility] SURGERY  
 Clinical Discharge Summary PERSON INFORMATION Name   
PAYTON BRAVO Age 69 Years  1950 Sex FEMALE Language English PCP 
Michael GARCIA Marital Status  Phone [phone] Med Service Ambulatory   
Surgery MRN [id number] Acct# Arrival 11/15/2019 09:50:58 Visit Reason SURGERY-LEFT
   
WRIST FRACTURE, CLOSED REDUCTION, POSSIBLE PERC PINNING, POSSIBLE ORIF Acuity 
   
24:50 Address: [address] [place] [address] Comment: PROVIDER INFORMATION 
VITALS   
INFORMATION Vital Sign Triage Latest Temp Oral Temp Temporal Temp Intravascular 
Temp   
Axillary Temp Rectal 02 Sat 98 % 95 % Respiratory Rate Peripheral Pulse Rate 
Apical   
Heart Rate Blood Pressure / 85 mmHg / 75 mmHg Comment: MEDICAL INFORMATION 
Allergy   
Info: Latex Allergy; penicillin Prescriptions Given:   
chondroitin/glucosamine/methylsulfonylmethane (Glucosamine & Chondroitin with 
MSM) 1   
tab(s) Oral every day. Medication List: Medications to Continue That Have Not 
Changed   
Other Medications chondroiti (more content not included)...  
  
  
  
                                                    Note  
   
                                                    Patient: PAYTON BRAVO   
MRN: [id number] FIN: [id number] Age: 69 years Sex:   
FEMALE   
: 1950 Associated Diagnoses: None Author: Noman Cherry MD 
Postoperative   
Information Post Operative Note: Post Anesthesia Care Unit. Anesthetic utilized:
   
General. Health Status Allergies: Allergic Reactions (All) Severity Not 
Documented   
Latex Allergy- Rash. Penicillin- Hives. Physical Examination VS/Measurements 
Vital   
Signs (last 24 hrs)_____ Last Charted___________ Heart Rate Peripheral 71 bpm 
(NOV 15   
14:25) Resp Rate H 60br/min (NOV 15 14:25) SBP H 143mmHg (NOV 15 14:) DBP 85 
mmHg   
(NOV 15 14:) SpO2 99 % (NOV 15 14:25) Weight 47.200 kg (NOV 15 10:19) Pain   
assessment: Self-reports no pain. General: Alert and oriented, No acute 
distress.   
Respiratory: Respirations are non-labored. Cardiovascular: Normal rate, Regular   
rhythm. Review / Management Condition: Stable. Assessment Anesthetic outcome No   
anesthetic complications noted. Adequate pain relief. No Complaint of nausea and
   
vomiting. Plan Tr (more content not included)...  
  
  
  
Procedure Findings  
  
  
                                                    Note  
   
                                                    Patient: PAYTON BRAVO   
MRN: [id number] FIN: [id number] Age: 69 years Sex:   
FEMALE   
: 1950 Associated Diagnoses: None Author: Noman Cherry MD 
Postoperative   
Information Post Operative Note: Post Anesthesia Care Unit. Anesthetic utilized:
   
General. Health Status Allergies: Allergic Reactions (All) Severity Not 
Documented   
Latex Allergy- Rash. Penicillin- Hives. Physical Examination VS/Measurements 
Vital   
Signs (last 24 hrs)_____ Last Charted___________ Heart Rate Peripheral 71 bpm 
(NOV 15   
14:) Resp Rate H 60br/min (NOV 15 14:) SBP H 143mmHg (NOV 15 14:) DBP 85 
mmHg   
(NOV 15 14:) SpO2 99 % (NOV 15 14:) Weight 47.200 kg (NOV 15 10:) Pain   
assessment: Self-reports no pain. General: Alert and oriented, No acute 
distress.   
Respiratory: Respirations are non-labored. Cardiovascular: Normal rate, Regular   
rhythm. Review / Management Condition: Stable. Assessment Anesthetic outcome No   
anesthetic complications noted. Adequate pain relief. No Complaint of nausea and
   
vomiting. Plan Tr (more content not included)...  
  
  
  
Reason for Referral  
  
  
                          Specialty    Diagnoses / Procedures Referred By Contac  
t Referred To Contact  
   
                                        Gastroenterology      
  
  
Diagnoses  
  
  
Polyp of colon  
  
  
  
Procedures  
  
  
Colonoscopy Screening  
  
  
Colonoscopy Screening  
  
  
GA COLONOSCOPY FLX DX   
W/COLLJ SPEC WHEN PFRMD  
  
  
GA COLON CA SCRN NOT HI   
RSK IND  
  
  
GA COLORECTAL SCRN; HI   
RISK IND  
  
  
GA COLONOSCOPY W/BIOPSY   
SINGLE/MULTIPLE  
  
  
GA COLSC FLX W/RMVL OF   
TUMOR POLYP LESION SNARE   
TQ  
  
  
GA COLSC FLX W/REMOVAL   
LESION BY HOT BX FORCEPS                  
  
  
Ana Oreilly MD  
  
  
[address] [address]  
  
  
Phone: [phone]  
  
  
Fax: [phone]                         
  
  
  
  
  
                    Referral ID Status    Reason    Start Date Expiration Date V  
isits   
Requested                               Visits   
Authorized  
   
                                        [id number]              Pending   
Review                    2023    3/12/2024    1            1  
  
  
  
Chief Complaint and Reason for Visit  
  
  
                                        Chief Complaint     back hip pain  
m47.817  
   
                                        Reason for Visit    Chronic pain  
Lumbosacral spondylosis  
Sacroiliitis  
  
  
  
Additional Source Comments  
  
  
  
                                                    INFORMATION SOURCE (unrecogn  
ized section and content)  
   
                                          
  
  
  
                                        DATE CREATED        AUTHOR  
   
                                2019                      [facility]  
  
  
  
                                DATE CREATED    AUTHOR          AUTHOR'S ORGANIZ  
ATION  
   
                                2021                      [facility]  
  
  
  
                                DATE CREATED    AUTHOR          AUTHOR'S ORGANIZ  
ATION  
   
                                2023                      [facility]  
  
  
  
                                DATE CREATED    AUTHOR          AUTHOR'S ORGANIZ  
ATION  
   
                                2023                      [facility]  
  
  
  
                                DATE CREATED    AUTHOR          AUTHOR'S ORGANIZ  
ATION  
   
                                2023                      [facility]  
  
  
  
                                DATE CREATED    AUTHOR          AUTHOR'S ORGANIZ  
ATION  
   
                                2023                      [facility]  
  
  
  
                                DATE CREATED    AUTHOR          AUTHOR'S ORGANIZ  
ATION  
   
                                01/10/2024                      [facility]  
  
  
  
                                DATE CREATED    AUTHOR          AUTHOR'S ORGANIZ  
ATION  
   
                                2024                      [facility]  
[facility]  
  
  
  
                                DATE CREATED    AUTHOR          AUTHOR'S ORGANIZ  
ATION  
   
                                2024                      [facility]  
  
  
  
                                DATE CREATED    AUTHOR          AUTHOR'S ORGANIZ  
ATION  
   
                                2025                      [facility]  
[facility]  
  
  
  
  
  
                                                    Ordered Prescriptions (unrec  
ognized section and content)  
   
                                          
  
  
  
                    Prescription Sig       Dispensed Refills   Start Date End Da  
te  
   
                                                      
  
  
tamsulosin (FLOMAX) 0.4   
MG capsule                              Take 1 capsule by   
mouth daily for 7   
days Take only if   
discharged home   
without ruff and   
having difficulty   
with urination                            
  
  
7 capsule           0                   2021  
   
                                                      
  
  
senna-docusate (SENOKOT   
S) 8.6-50 MG per tablet                 Take 1 tablet by   
mouth daily                               
  
  
60 tablet           0                   2021            
   
                                                      
  
  
ciprofloxacin (CIPRO)   
250 MG tablet                           Take 1 tablet by   
mouth 2 times daily   
for 7 days Take for   
full 7 days if   
discharged home   
with ruff. Take   
for 5 days if   
discharged home   
without ruff                             
  
  
14 tablet           0                   2021  
   
                                                      
  
  
ondansetron (ZOFRAN ODT)   
4 MG disintegrating   
tablet                                  Take 1 tablet by   
mouth every 8 hours   
as needed for   
Nausea or Vomiting                        
  
  
15 tablet           0                   2021            
   
                                                      
  
  
ibuprofen (ADVIL;MOTRIN)   
600 MG tablet                           Take 1 tablet by   
mouth every 6 hours   
as needed for Pain                        
  
  
30 tablet           0                   2021            
   
                                                      
  
  
HYDROcodone-acetaminophe  
n (NORCO) 5-325 MG per   
tabletIndications:Post-o  
p pain                                  Take 1 tablet by   
mouth every 6 hours   
as needed for Pain   
for up to 3 days.   
Intended supply: 7   
days. Take lowest   
dose possible to   
manage pain                               
  
  
12 tablet           0                   2021  
   
                                                      
  
  
tamsulosin (FLOMAX) 0.4   
MG capsule                              Take 1 capsule by   
mouth daily for 7   
days Take only if   
discharged home   
without ruff and   
having difficulty   
with urination                            
  
  
7 capsule           0                   2021  
   
                                                      
  
  
simethicone (MYLICON) 80   
MG chewable tablet                      Take 1 tablet by   
mouth 4 times daily   
as needed for   
Flatulence                                
  
  
30 tablet           0                   2021  
   
                                                      
  
  
ondansetron (ZOFRAN ODT)   
4 MG disintegrating   
tablet                                  Take 1 tablet by   
mouth every 8 hours   
as needed for   
Nausea or Vomiting                        
  
  
15 tablet           0                   2021  
   
                                                      
  
  
senna-docusate (SENOKOT   
S) 8.6-50 MG per tablet                 Take 1 tablet by   
mouth daily                               
  
  
60 tablet           0                   2021  
   
                                                      
  
  
ibuprofen (ADVIL;MOTRIN)   
600 MG tablet                           Take 1 tablet by   
mouth every 6 hours   
as needed for Pain                        
  
  
30 tablet           1                   2021  
   
                                                      
  
  
HYDROcodone-acetaminophe  
n (NORCO) 5-325 MG per   
tabletIndications:Post-o  
p pain                                  Take 1 tablet by   
mouth every 6 hours   
as needed for Pain   
for up to 6 days.   
Intended supply: 7   
days. Take lowest   
dose possible to   
manage pain                               
  
  
24 tablet           0                   2021  
   
                                                      
  
  
ciprofloxacin (CIPRO)   
250 MG tablet                           Take 1 tablet by   
mouth 2 times daily   
for 7 days Take for   
full 7 days if   
discharged home   
with ruff. Take   
for 5 days if   
discharged home   
without ruff                             
  
  
14 tablet           0                   2021  
  
  
  
                                    Scheduled  
  
                                                    Active and Recently Administ  
ered Medications (unrecognized section and content)  
   
                                          
  
  
  
                          Medication Order 2021  
   
                                                      
  
  
aprepitant (EMEND) capsule   
40 mg (COMPLETED)  
40 mg, Oral, ONCE, On 21 at 1215, For 1   
dose, Pre-op (day of   
surgery)  
                                                    1152 (Given - Provider:   
Becca Garay RN)  
                                          
   
                                                      
  
  
ceFAZolin (ANCEF) 2000 mg   
in dextrose 5 % 50 mL IVPB   
(COMPLETED)  
2,000 mg, IntraVENous,   
ONCE, 1 dose, On 21 at 1130, Pre-op   
(day of surgery)  
                                                    1245 (Given - Provider:   
Mariama Ruano APRN - CRNA)  
                                          
   
                                                      
  
  
clindamycin (CLEOCIN) IVPB   
300 mg (COMPLETED)  
300 mg, IntraVENous, EVERY   
8 HOURS, 2 doses, First   
dose on 21 at   
2030, Last dose on 21 at 0430  
                                                    2058 (New Bag -   
Provider: Niurka Mcmullen RN)2148   
(Stopped - Provider:   
Niurka Mcmullen RN)  
                                        0442 (New Bag -   
Provider: Niurka Mcmullen RN)0547   
(Stopped - Provider:   
Niurka Mcmullen RN)  
  
   
                                                      
  
  
enoxaparin (LOVENOX)   
injection 40 mg  
40 mg, SubCUTAneous, DAILY,   
First dose on 21   
at 0900, Pharmacy to dose   
if renal insufficiency   
present. POD#1 if Hgb >8.0.   
Do not administer if Hgb <   
8.0, Post-op  
                                                            0900 (Not Given -   
Provider: Cee Arthur RN - Reason:   
Other)  
  
   
                                                      
  
  
famotidine (PEPCID)   
injection 20 mg (COMPLETED)  
20 mg, IntraVENous, ONCE,   
On 21 at 1215,   
For 1 dose, Administer over   
2 minutes., Pre-op (day of   
surgery)  
                                                    1152 (Given - Provider:   
Becca Garay, TEE)  
                                          
   
                                                      
  
  
fentaNYL (SUBLIMAZE)   
injection 100 mcg   
(COMPLETED)  
100 mcg, IntraVENous, ONCE,   
On 21 at 1215,   
For 1 dose, If oral and IV   
narcotics ordered, use oral   
first and only use IV if   
oral is ineffective or   
cannot take oral. Do Not   
give oral and IV within 1   
hour of each other unless   
specifically ordered.,   
Pre-op (day of surgery)  
                                                    1228 (Given - Provider:   
Becca Garay RN)  
                                          
   
                                                      
  
  
ketorolac (TORADOL)   
injection 15 mg  
15 mg, IntraVENous, EVERY 6   
HOURS, First dose (after   
last reorder) on 21 at 1730, For 24   
hours, Do not administer   
for more than 5 days.,   
Post-op  
                                                    1827 (Given - Provider:   
Avni Rodriguez RN)2328 (Not Given -   
Provider: Niurka Mcmullen RN - Reason:   
Patient/family refused)  
                                        0441 (Given - Provider:   
Niurka Mcmullen, RN)1256   
(Given - Provider: Cee Arthur, TEE)  
  
   
                                                      
  
  
midazolam PF (VERSED)   
injection 2 mg (COMPLETED)  
2 mg, IntraVENous, ONCE, On   
21 at 1215, For 1   
dose, Pre-op (day of   
surgery)  
                                                    1230 (Given - Provider:   
Becca Garay RN)  
                                          
   
                                                      
  
  
naloxone (NARCAN) injection   
1 mg (COMPLETED)  
1 mg, IntraVENous, ONCE, On   
21 at 1745, For 1   
dose  
                                                    1739 (Given - Provider:   
Sarah Fritsch, RN)  
                                          
   
                                                      
  
  
phenazopyridine (PYRIDIUM)   
tablet 200 mg (COMPLETED)  
200 mg, Oral, ONCE, On 21 at 1130, For 1   
dose, Take with food. May   
cause discoloration of   
urine.  
                                                    1151 (Given - Provider:   
Becca Garay RN)  
                                          
   
                                                      
  
  
scopolamine   
(TRANSDERM-SCOP)   
transdermal patch 1 patch  
1 patch, TransDERmal,   
Administer over 72 Hours,   
ONCE, On 21 at   
1130, For 1 dose, 1.5 mg   
patch delivers 1 mg over 3   
days. Apply patch to   
hairless area behind the   
ear.  
                                                    1156 (Patch Applied -   
Provider: Becca Garay RN - Comment:   
behind rt ear)  
                                          
   
                                                      
  
  
sennosides-docusate sodium   
(SENOKOT-S) 8.6-50 MG   
tablet 1 tablet  
1 tablet, Oral, NIGHTLY,   
First dose on 21   
at 2100, Post-op  
                                                     (Given - Provider:   
Niurka Mcmullen RN)  
                                        2100 (Due)  
  
   
                                                      
  
  
sodium chloride flush 0.9 %   
injection 5-40 mL  
5-40 mL, IntraVENous, EVERY   
12 HOURS SCHEDULED (2 times   
per day), First dose on 21 at 2100, For Line   
Patency: Peripheral IV = 5   
mL; Midline or Central Line   
= 10 mL/lumen. If following   
IV push medication,   
administer flush at same   
rate as the IV push. Flush   
volume is determined by   
type of infusion therapy   
being given. For   
non-viscous solutions use:   
Peripheral IV = 5 mL   
Midline or Central Line =   
10 mL/lumen For viscous   
solutions (i.e. blood   
components, parenteral   
nutrition, contrast media,   
or after obtaining blood   
sample) use: Peripheral IV   
= 10 mL Midline or Central   
Line = 20 mL/lumen, Post-op  
                                                    2102 (Given - Provider:   
Niurka Mcmullen RN)  
                                        0813 (Not Given -   
Provider: Cee Arthur RN - Reason: IV   
Fluid Infusing)2100   
(Due)  
  
  
                                   Continuous  
  
                          Medication Order 2021  
   
                                                      
  
  
0.9 % sodium chloride infusion  
IntraVENous, at 100 mL/hr,   
CONTINUOUS, Starting on 21 at 1700, Post-op  
                                                    1740 (New Bag - Provider: Sa rah Fritsch, RN)  
                                          
   
                                                      
  
  
lactated ringers infusion   
(CANCELED)  
IntraVENous, at 100 mL/hr,   
CONTINUOUS, Starting on 21 at 1130, Pre-op (day of   
surgery)  
                                                    1204 (New Bag - Provider:   
Becca Garay RN)1245   
(NoRateChange - Provider:   
JORDANA Underwood   
CRNA)1327 (Paused - Provider:   
Mariama S [place], APRN -   
CRNA - Comment: Switch to   
gravity)1328 (New Bag -   
Provider: JORDANA Underwood CRNA)1433 (Anesthesia   
Volume Adjustment - Provider:   
JORDANA Underwood CRNA)  
                                          
  
                                       PRN  
  
                          Medication Order 2021  
   
                                                      
  
  
0.9 % sodium chloride   
infusion  
25 mL, IntraVENous, at 100   
mL/hr, PRN, If patient   
receiving piggyback   
infusions without ordered   
maintenance IV fluids or   
with frequent/long duration   
piggyback infusions,   
Starting on 21 at   
1635, Administer at the same   
rate as the piggyback being   
infused., Post-op  
                                                              
   
                                                      
  
  
acetaminophen (TYLENOL)   
tablet 1,000 mg  
1,000 mg, Oral, EVERY 6   
HOURS PRN, Pain Mild (1-3),   
Pain Moderate (4-6), Fever,   
Starting on 21 at   
0606, Maximum dose of   
acetaminophen is 4000 mg   
from all sources in 24   
hours.  
                                                              
   
                                                      
  
  
benzocaine-menthol (CEPACOL   
SORE THROAT) lozenge 1   
lozenge  
1 lozenge, Oral, EVERY 2   
HOURS PRN, Sore Throat,   
Starting on 21 at   
1747  
                                                              
   
                                                      
  
  
HYDROcodone-acetaminophen   
(NORCO) 5-325 MG per tablet   
1 tablet  
1 tablet, Oral, EVERY 4   
HOURS PRN, Pain Moderate   
(4-6), Pain Severe (7-10),   
Starting on 21 at   
1735, Maximum dose of   
acetaminophen is 4000 mg   
from all sources in 24   
hours., Post-op  
                                                    210 (Given - Provider:   
Niurka Mcmullen RN)  
                                        0506 (Held by provider   
- Provider: Gabi Branch MD - Reason:   
Other)  
  
   
                                                      
  
  
HYDROmorphone (DILAUDID)   
injection 0.5 mg (CANCELED)  
0.5 mg, IntraVENous, EVERY 5   
MIN PRN, Pain Severe (7-10),   
Starting on 21 at   
1136, For 4 doses, Phase I -   
Initial therapy for severe   
pain., PACU only  
                                                    1530 (Given - Provider:   
Hailey Vogt RN)1600 (Given -   
Provider: Hailey Vogt RN)  
                                          
   
                                                      
  
  
HYDROmorphone (DILAUDID)   
injection 0.5 mg (CANCELED)  
0.5 mg, IntraVENous, EVERY 5   
MIN PRN, Pain Severe (7-10),   
Starting on 21 at   
1136, For 4 doses, Phase I -   
Secondary therapy to be used   
after all initial severe   
pain medication doses have   
been administered., PACU   
only  
                                                    1543 (Given - Provider:   
Hailey Vogt RN)  
                                          
   
                                                      
  
  
magnesium hydroxide (MILK OF   
MAGNESIA) 400 MG/5ML   
suspension 30 mL  
30 mL, Oral, DAILY PRN,   
Constipation, Starting on   
21 at 1635, First   
line therapy for   
constipation., Post-op  
                                                              
   
                                                      
  
  
morphine (PF) injection 2 mg  
2 mg, IntraVENous, EVERY 4   
HOURS PRN, Pain Severe   
(7-10), Starting on 21 at 1635, If oral   
and IV narcotics ordered,   
use oral first and only use   
IV if oral is ineffective or   
cannot take oral. Do Not   
give oral and IV within 1   
hour of each other unless   
specifically ordered.,   
Post-op  
                                                              
   
                                                      
  
  
ondansetron (ZOFRAN)   
injection 4 mg (COMPLETED)  
4 mg, IntraVENous, ONCE PRN,   
Nausea, Starting on 21 at 1136, For 1   
dose, PACU only  
                                                    1530 (Given - Provider:   
Hailey Vogt RN)  
                                          
   
                                                      
  
  
ondansetron (ZOFRAN)   
injection 4 mg(Linked Group   
1)  
4 mg, IntraVENous, EVERY 6   
HOURS PRN, Nausea, Vomiting,   
Starting on 21 at   
1635, Administer if oral   
route cannot be used.,   
Post-op  
                                                              
   
                                                      
  
  
ondansetron (ZOFRAN-ODT)   
disintegrating tablet 4   
mg(Linked Group 1)  
4 mg, Oral, EVERY 8 HOURS   
PRN, Nausea, Vomiting,   
Starting on 21 at   
1635, Post-op  
                                                              
   
                                                      
  
  
oxyCODONE-acetaminophen   
(PERCOCET) 5-325 MG per   
tablet 1 tablet (COMPLETED)  
1 tablet, Oral, PRN, Pain   
Moderate (4-6), Starting on   
21 at 1136, For 1   
dose, PHASE II, PACU only  
                                                    1606 (Given - Provider:   
Hailey Vogt RN)  
                                          
   
                                                      
  
  
sodium chloride flush 0.9 %   
injection 5-40 mL  
5-40 mL, IntraVENous, PRN,   
Line Care, Starting on 21 at 1635, After   
every IV line use, Post-op  
                                                              
  
                                  No Frequency  
  
                          Medication Order 2021  
   
                                                      
  
  
scopolamine (TRANSDERM-SCOP) 1 MG/3DAYS transdermal   
patch  
Starting on 21 at 1154, For 1 dose, Becca Garay: cabinet override  
                                                              
  
                                  Linked Groups  
  
                                                    Order  
   
                                                      
  
  
Group 1:  
  
  
ondansetron (ZOFRAN-ODT) disintegrating tablet 4 mgJump to med  
4 mg, Oral, EVERY 8 HOURS PRN, Nausea, Vomiting, Starting on 21 at 
1635,   
Post-op  
  
   
                                                      
  
  
Or  
  
  
ondansetron (ZOFRAN) injection 4 mgJump to med  
4 mg, IntraVENous, EVERY 6 HOURS PRN, Nausea, Vomiting, Starting on 21
 at   
1635<br>Administer if oral route cannot be used.<br>Post-op  
  
  
  
  
  
  
                                                    Care Teams (unrecognized sec  
tion and content)  
   
                                          
  
  
  
                      Team Member Relationship Specialty  Start Date End Date  
   
                                                      
  
  
Cee Hand  
  
  
[address] [place] [address]  
  
  
[phone] (Work) PCP - General   Nurse Practitioner 21           
  
  
  
                      Team Member Relationship Specialty  Start Date End Date  
   
                                                      
  
  
Cee Hand APRN-CNP  
  
  
NPI: [id number]  
  
  
[address] [facility] [address] [phone] (Work)  
  
  
[phone] (Fax) PCP - General                   23           
  
  
  
                      Team Member Relationship Specialty  Start Date End Date  
   
                                                      
  
  
Cee Hand APRN-CNP  
  
  
NPI: [id number]  
  
  
[address] [facility] [id number] [phone] (Work)  
  
  
[phone] (Fax) PCP - General                   23           
  
  
  
                      Team Member Relationship Specialty  Start Date End Date  
   
                                                      
  
  
Cee Hand APRN-CNP  
  
  
NPI: [id number]  
  
  
[address] [facility] [address] [phone] (Work)  
  
  
[phone] (Fax) PCP - General                   23           
  
  
  
                      Team Member Relationship Specialty  Start Date End Date  
   
                                                      
  
  
Cee Hand, APRN-CNP  
  
  
NPI: [id number]  
  
  
[address] [facility] [address] [phone] (Work)  
  
  
[phone] (Fax) PCP - General                   23           
  
  
  
                                                    Team Status: Active   
   
                          Member       Role         Status       Dates  
   
                          Cee Hand Primary Care Provider Active         
  
  
  
                                                    Team Status: Inactive   
   
                          Member       Role         Status       Dates  
   
                          Cee Hand Primary Care Provider Active       Sta  
rt: 2024  
End: 2024  
   
                          Joss Juares MD Attending Provider Active       Sta  
rt: 2024  
End: 2024  
  
  
  
                      Team Member Relationship Specialty  Start Date End Date  
   
                                                      
  
  
Godwin Wallace MD  
  
  
NPI: [id number]  
  
  
[address] [place] [id number] [phone] (Work)  
  
  
[phone] (Fax) PCP - General   Family Medicine 24           
   
                                                      
  
  
Cee Hand NP  
  
  
NPI: [id number]  
  
  
[address] [place] [id number] [phone] (Work)  
  
  
[phone] (Fax) Nurse Practitioner Family Medicine 23            
  
  
  
                      Team Member Relationship Specialty  Start Date End Date  
   
                                                      
  
  
Godwin Wallace MD  
  
  
NPI: [id number]  
  
  
[address] [place] [address] [phone] (Work)  
  
  
[phone] (Fax) PCP - General   Family Medicine 24           
   
                                                      
  
  
Cee Hand NP  
  
  
NPI: [id number]  
  
  
[address] [place] [address] [phone] (Work)  
  
  
[phone] (Fax) Nurse Practitioner Family Medicine 23            
  
  
  
                      Team Member Relationship Specialty  Start Date End Date  
   
                                                      
  
  
Godwin Wallace MD  
  
  
NPI: [id number]  
  
  
[address] [place] [address] [phone] (Work)  
  
  
[phone] (Fax) PCP - General   [facility] 24           
   
                                                      
  
  
Cee Hand NP  
  
  
NPI: [id number]  
  
  
[address] [place] [address] [phone] (Work)  
  
  
[phone] (Fax) Nurse Practitioner Family Medicine 23            
  
  
  
  
  
                                                    REASON FOR VISIT (unrecogniz  
ed section and content)  
   
                                          
  
  
  
                                        Reason              Comments  
   
                                        Other               Colonoscopy K63.5 45  
378  
  
  
  
                                        Reason              Comments  
   
                                        Error (VOID this visit)   
  
  
  
                          Specialty    Diagnoses / Procedures Referred By Contac  
t Referred To Contact  
   
                                        Gastroenterology      
  
  
Diagnoses  
  
  
Polyp of colon  
  
  
  
Procedures  
  
  
Colonoscopy Screening  
  
  
Colonoscopy Screening  
  
  
GA COLONOSCOPY FLX DX   
W/COLLJ SPEC WHEN PFRMD  
  
  
GA COLON CA SCRN NOT HI   
RSK IND  
  
  
GA COLORECTAL SCRN; HI   
RISK IND  
  
  
GA COLONOSCOPY W/BIOPSY   
SINGLE/MULTIPLE  
  
  
GA COLSC FLX W/RMVL OF   
TUMOR POLYP LESION SNARE   
TQ  
  
  
GA COLSC FLX W/REMOVAL   
LESION BY HOT BX FORCEPS                  
  
  
Ana Oreilly MD  
  
  
[address] [address]  
  
  
Phone: [phone]  
  
  
Fax: [phone]                         
  
  
  
  
  
                    Referral ID Status    Reason    Start Date Expiration Date V  
isits   
Requested                               Visits   
Authorized  
   
                                        [id number]              Pending   
Review                    2023    3/12/2024    1            1  
  
  
  
                                        Reason              Comments  
   
                                        Wound Check         Left leg  
  
  
  
  
  
                                                    Goals (unrecognized section   
and content)  
   
                                                    Goals may be documented in a  
n alternate section  
  
FOR RECORDS PERTAINING TO PATIENTS WHO ARE OR HAVE BEEN ENROLLED IN A CHEMICAL 
DEPENDENCY/SUBSTANCEABUSE PROGRAM, SOME INFORMATION MAY BE OMITTED. This 
clinical summary was aggregated from multiple sources. Caution should be 
exercised in using it in the provision of clinical care. This summary normalizes
 information from multiple sources, and as a consequence, information in this 
document may materially change the coding, format and clinical context of 
patient data. In addition, data may be omitted in some cases. CLINICAL DECISIONS
 SHOULD BE BASED ON THE PRIMARY CLINICAL RECORDS. [facility] [employer] [facility]. provides
 no warranty or guarantee of the accuracy or completeness of information in this
 document.

## (undated) DEVICE — MHPB CYSTO PACK-LF: Brand: MEDLINE INDUSTRIES, INC.

## (undated) DEVICE — ADHESIVE SKIN CLSR 0.7ML TOP DERMBND ADV

## (undated) DEVICE — TUBING, SUCTION, 9/32" X 20', STRAIGHT: Brand: MEDLINE INDUSTRIES, INC.

## (undated) DEVICE — 35 ML SYRINGE LUER-LOCK TIP: Brand: MONOJECT

## (undated) DEVICE — MARKER,SKIN,WI/RULER AND LABELS: Brand: MEDLINE

## (undated) DEVICE — CONTAINER,SPECIMEN,4OZ,OR STRL: Brand: MEDLINE

## (undated) DEVICE — GOWN,AURORA,NONRNF,XL,30/CS: Brand: MEDLINE

## (undated) DEVICE — 1000 S-DRAPE TOWEL DRAPE 10/BX: Brand: STERI-DRAPE™

## (undated) DEVICE — SOLUTION IV IRRIG 1000ML POUR BTL 2F7114

## (undated) DEVICE — YANKAUER,FLEXIBLE HANDLE,REGLR CAPACITY: Brand: MEDLINE INDUSTRIES, INC.

## (undated) DEVICE — CATHETER F BLLN 5CC 18FR 2 W HYDRGEL COAT LESS TRAUM LUB

## (undated) DEVICE — JELLY,LUBE,STERILE,FLIP TOP,TUBE,2-OZ: Brand: MEDLINE

## (undated) DEVICE — 40580 - THE PINK PAD - ADVANCED TRENDELENBURG POSITIONING KIT: Brand: 40580 - THE PINK PAD - ADVANCED TRENDELENBURG POSITIONING KIT

## (undated) DEVICE — Z DISCONTINUED BY MEDLINE USE 2711682 TRAY SKIN PREP DRY W/ PREM GLV

## (undated) DEVICE — GLOVE ORANGE PI 7   MSG9070

## (undated) DEVICE — DRAPE,REIN 53X77,STERILE: Brand: MEDLINE

## (undated) DEVICE — SYRINGE, LUER LOCK, 10ML: Brand: MEDLINE

## (undated) DEVICE — PLUMEPORT LAPAROSCOPIC SMOKE FILTRATION DEVICE: Brand: PLUMEPORT ACTIV

## (undated) DEVICE — TIP COVER ACCESSORY

## (undated) DEVICE — SOLUTION SURG PREP POV IOD 7.5% 4 OZ

## (undated) DEVICE — HYPODERMIC SAFETY NEEDLE: Brand: MAGELLAN

## (undated) DEVICE — UNDERPANTS MAT L/XL KNIT SEAMLESS CLR CODE WAISTBAND

## (undated) DEVICE — CATHETER URETH 18FR BLLN 30CC 2 W F INF CTRL BARDX

## (undated) DEVICE — INTENDED FOR TISSUE SEPARATION, AND OTHER PROCEDURES THAT REQUIRE A SHARP SURGICAL BLADE TO PUNCTURE OR CUT.: Brand: BARD-PARKER ® CARBON RIB-BACK BLADES

## (undated) DEVICE — PAD,SANITARY,11 IN,MAXI,W/WINGS,N-STRL: Brand: MEDLINE

## (undated) DEVICE — TROCAR: Brand: KII FIOS FIRST ENTRY

## (undated) DEVICE — 1010 S-DRAPE TOWEL DRAPE 10/BX: Brand: STERI-DRAPE™

## (undated) DEVICE — PLUG,CATHETER,DRAINAGE PROTECTOR,TUBE: Brand: MEDLINE

## (undated) DEVICE — Device

## (undated) DEVICE — 4-PORT MANIFOLD: Brand: NEPTUNE 2

## (undated) DEVICE — TUBING, SUCTION, 3/16" X 10', STRAIGHT: Brand: MEDLINE

## (undated) DEVICE — SCISSOR SURG METZ CRV TIP

## (undated) DEVICE — CYSTO/BLADDER IRRIGATION SET, REGULATING CLAMP

## (undated) DEVICE — NEEDLE SPINAL 22GA L3.5IN SPINOCAN

## (undated) DEVICE — PREP SOL PVP IODINE 4%  4 OZ/BTL

## (undated) DEVICE — MHPB BASIC LAP PACK: Brand: MEDLINE INDUSTRIES, INC.

## (undated) DEVICE — 20 ML SYRINGE LUER-LOCK TIP: Brand: MONOJECT

## (undated) DEVICE — SUTURE VCRL SZ 2-0 L27IN ABSRB UD L26MM CT-2 1/2 CIR J269H

## (undated) DEVICE — DRAPE,UNDRBUT,WHT GRAD PCH,CAPPORT,20/CS: Brand: MEDLINE

## (undated) DEVICE — SUTURE MCRYL + SZ 4 0 L18IN ABSRB UD PC 3 L16MM 3 8 CIR PRIM MCP845G

## (undated) DEVICE — SUTURE VCRL + SZ 2-0 L27IN ABSRB UD CT-2 L26MM 1/2 CIR TAPR VCP269H

## (undated) DEVICE — GLOVE ORANGE PI 7 1/2   MSG9075

## (undated) DEVICE — SYRINGE MED 10ML SLIP TIP BLNT FILL AND LUERLOCK DISP

## (undated) DEVICE — BLANKET WRM W29.9XL79.1IN UP BODY FORC AIR MISTRAL-AIR

## (undated) DEVICE — ELECTRODE PT RET AD L9FT HI MOIST COND ADH HYDRGEL CORDED

## (undated) DEVICE — CANNULA SEAL

## (undated) DEVICE — INSUFFLATION NEEDLE TO ESTABLISH PNEUMOPERITONEUM.: Brand: INSUFFLATION NEEDLE

## (undated) DEVICE — BLADE CLIPPER GEN PURP NS

## (undated) DEVICE — SUTURE VCRL SZ 1 L36IN ABSRB UD L36MM CT-1 1/2 CIR J947H

## (undated) DEVICE — COUNTER NDL 40 COUNT HLD 70 FOAM BLK ADH W/ MAG

## (undated) DEVICE — NEEDLE SPNL L3.5IN DIA25GA QNCKE TYP BVL SPINOCAN

## (undated) DEVICE — APPLICATOR MEDICATED 26 CC SOLUTION HI LT ORNG CHLORAPREP

## (undated) DEVICE — NEEDLE SPNL 22GA BLK HUB S STL REG WALL FIT STYL W/ QNCKE

## (undated) DEVICE — MHPB GYN MINOR PACK: Brand: MEDLINE INDUSTRIES, INC.

## (undated) DEVICE — ARM DRAPE

## (undated) DEVICE — PROTECTOR ULN NRV PUR FOAM HK LOOP STRP ANATOMICALLY

## (undated) DEVICE — DEVICE TRCR 12X9X3IN WHT CLSR DISP OMNICLOSE

## (undated) DEVICE — PENCIL ES L3M BTTN SWCH HOLSTER W/ BLDE ELECTRD EDGE

## (undated) DEVICE — SHIELD SOAK PRE-KLENZ 6ML

## (undated) DEVICE — TOTAL TRAY, 16FR 10ML SIL FOLEY, URN: Brand: MEDLINE

## (undated) DEVICE — GAUZE,SPONGE,4"X4",16PLY,XRAY,STRL,LF: Brand: MEDLINE

## (undated) DEVICE — BLADELESS OBTURATOR: Brand: WECK VISTA

## (undated) DEVICE — STRAP,POSITIONING,KNEE/BODY,FOAM,4X60": Brand: MEDLINE

## (undated) DEVICE — SUTURE GOR TX SZ 0 L36IN NONABSORBABLE L26MM TH-26 1/2 CIR 2N01A

## (undated) DEVICE — SUTURE SZ 0 27IN 5/8 CIR UR-6  TAPER PT VIOLET ABSRB VICRYL J603H